# Patient Record
Sex: MALE | Race: WHITE | NOT HISPANIC OR LATINO | Employment: OTHER | URBAN - METROPOLITAN AREA
[De-identification: names, ages, dates, MRNs, and addresses within clinical notes are randomized per-mention and may not be internally consistent; named-entity substitution may affect disease eponyms.]

---

## 2017-01-03 ENCOUNTER — CLINICAL SUPPORT (OUTPATIENT)
Dept: CARDIOLOGY | Facility: CLINIC | Age: 72
End: 2017-01-03
Payer: MEDICARE

## 2017-01-03 DIAGNOSIS — Z95.0 PACEMAKER: Primary | ICD-10-CM

## 2017-01-03 DIAGNOSIS — Z95.0 PACEMAKER: ICD-10-CM

## 2017-01-03 DIAGNOSIS — I49.5 SSS (SICK SINUS SYNDROME): ICD-10-CM

## 2017-01-03 PROCEDURE — 93294 REM INTERROG EVL PM/LDLS PM: CPT | Mod: S$GLB,,, | Performed by: INTERNAL MEDICINE

## 2017-01-03 PROCEDURE — 93296 REM INTERROG EVL PM/IDS: CPT | Mod: S$GLB,,, | Performed by: INTERNAL MEDICINE

## 2017-04-25 ENCOUNTER — CLINICAL SUPPORT (OUTPATIENT)
Dept: CARDIOLOGY | Facility: CLINIC | Age: 72
End: 2017-04-25
Payer: MEDICARE

## 2017-04-25 DIAGNOSIS — I49.5 SSS (SICK SINUS SYNDROME): ICD-10-CM

## 2017-04-25 DIAGNOSIS — Z95.0 PACEMAKER: ICD-10-CM

## 2017-04-25 PROCEDURE — 93294 REM INTERROG EVL PM/LDLS PM: CPT | Mod: S$GLB,,, | Performed by: INTERNAL MEDICINE

## 2017-04-25 PROCEDURE — 93296 REM INTERROG EVL PM/IDS: CPT | Mod: S$GLB,,, | Performed by: INTERNAL MEDICINE

## 2017-05-25 ENCOUNTER — LAB VISIT (OUTPATIENT)
Dept: LAB | Facility: HOSPITAL | Age: 72
End: 2017-05-25
Attending: INTERNAL MEDICINE
Payer: MEDICARE

## 2017-05-25 DIAGNOSIS — E53.8 OTHER B-COMPLEX DEFICIENCIES: ICD-10-CM

## 2017-05-25 DIAGNOSIS — E29.1 OTHER TESTICULAR HYPOFUNCTION: Primary | ICD-10-CM

## 2017-05-25 DIAGNOSIS — E55.9 UNSPECIFIED VITAMIN D DEFICIENCY: ICD-10-CM

## 2017-05-25 LAB
25(OH)D3+25(OH)D2 SERPL-MCNC: 28 NG/ML
ALBUMIN SERPL BCP-MCNC: 3.6 G/DL
ALP SERPL-CCNC: 75 U/L
ALT SERPL W/O P-5'-P-CCNC: 18 U/L
ANION GAP SERPL CALC-SCNC: 6 MMOL/L
AST SERPL-CCNC: 32 U/L
BASOPHILS # BLD AUTO: 0.04 K/UL
BASOPHILS NFR BLD: 0.6 %
BILIRUB SERPL-MCNC: 0.7 MG/DL
BUN SERPL-MCNC: 14 MG/DL
CALCIUM SERPL-MCNC: 9 MG/DL
CHLORIDE SERPL-SCNC: 104 MMOL/L
CO2 SERPL-SCNC: 28 MMOL/L
CREAT SERPL-MCNC: 1.4 MG/DL
CREAT UR-MCNC: 116 MG/DL
DIFFERENTIAL METHOD: ABNORMAL
EOSINOPHIL # BLD AUTO: 0.2 K/UL
EOSINOPHIL NFR BLD: 3.1 %
ERYTHROCYTE [DISTWIDTH] IN BLOOD BY AUTOMATED COUNT: 14.1 %
EST. GFR  (AFRICAN AMERICAN): 56.8 ML/MIN/1.73 M^2
EST. GFR  (NON AFRICAN AMERICAN): 49.1 ML/MIN/1.73 M^2
GLUCOSE SERPL-MCNC: 82 MG/DL
HCT VFR BLD AUTO: 47.5 %
HGB BLD-MCNC: 15.9 G/DL
LH SERPL-ACNC: 0.1 MIU/ML
LYMPHOCYTES # BLD AUTO: 2.1 K/UL
LYMPHOCYTES NFR BLD: 30.4 %
MCH RBC QN AUTO: 32.7 PG
MCHC RBC AUTO-ENTMCNC: 33.5 %
MCV RBC AUTO: 98 FL
MICROALBUMIN UR DL<=1MG/L-MCNC: 4 UG/ML
MICROALBUMIN/CREATININE RATIO: 3.4 UG/MG
MONOCYTES # BLD AUTO: 0.9 K/UL
MONOCYTES NFR BLD: 13.2 %
NEUTROPHILS # BLD AUTO: 3.7 K/UL
NEUTROPHILS NFR BLD: 52.6 %
PLATELET # BLD AUTO: 287 K/UL
PMV BLD AUTO: 10.2 FL
POTASSIUM SERPL-SCNC: 4.4 MMOL/L
PROT SERPL-MCNC: 7 G/DL
RBC # BLD AUTO: 4.86 M/UL
SODIUM SERPL-SCNC: 138 MMOL/L
VIT B12 SERPL-MCNC: 569 PG/ML
WBC # BLD AUTO: 7.04 K/UL

## 2017-05-25 PROCEDURE — 83002 ASSAY OF GONADOTROPIN (LH): CPT

## 2017-05-25 PROCEDURE — 85025 COMPLETE CBC W/AUTO DIFF WBC: CPT

## 2017-05-25 PROCEDURE — 82570 ASSAY OF URINE CREATININE: CPT

## 2017-05-25 PROCEDURE — 84402 ASSAY OF FREE TESTOSTERONE: CPT

## 2017-05-25 PROCEDURE — 82306 VITAMIN D 25 HYDROXY: CPT

## 2017-05-25 PROCEDURE — 80053 COMPREHEN METABOLIC PANEL: CPT

## 2017-05-25 PROCEDURE — 82607 VITAMIN B-12: CPT

## 2017-07-13 LAB — TESTOST FREE SERPL-MCNC: 44.5 PG/ML

## 2017-08-16 ENCOUNTER — LAB VISIT (OUTPATIENT)
Dept: LAB | Facility: HOSPITAL | Age: 72
End: 2017-08-16
Attending: INTERNAL MEDICINE
Payer: MEDICARE

## 2017-08-16 DIAGNOSIS — Z12.5 SPECIAL SCREENING FOR MALIGNANT NEOPLASM OF PROSTATE: ICD-10-CM

## 2017-08-16 DIAGNOSIS — R53.81 MALAISE: ICD-10-CM

## 2017-08-16 DIAGNOSIS — E29.1 OTHER TESTICULAR HYPOFUNCTION: Primary | ICD-10-CM

## 2017-08-16 DIAGNOSIS — E55.9 UNSPECIFIED VITAMIN D DEFICIENCY: ICD-10-CM

## 2017-08-16 PROCEDURE — 36415 COLL VENOUS BLD VENIPUNCTURE: CPT | Mod: PO

## 2017-08-16 PROCEDURE — 84402 ASSAY OF FREE TESTOSTERONE: CPT

## 2017-08-18 DIAGNOSIS — Z12.11 COLON CANCER SCREENING: ICD-10-CM

## 2017-08-18 LAB — TESTOST FREE SERPL-MCNC: 10.4 PG/ML

## 2017-08-22 ENCOUNTER — PATIENT MESSAGE (OUTPATIENT)
Dept: FAMILY MEDICINE | Facility: CLINIC | Age: 72
End: 2017-08-22

## 2017-08-30 ENCOUNTER — OFFICE VISIT (OUTPATIENT)
Dept: CARDIOLOGY | Facility: CLINIC | Age: 72
End: 2017-08-30
Payer: MEDICARE

## 2017-08-30 ENCOUNTER — CLINICAL SUPPORT (OUTPATIENT)
Dept: CARDIOLOGY | Facility: CLINIC | Age: 72
End: 2017-08-30
Payer: MEDICARE

## 2017-08-30 VITALS
DIASTOLIC BLOOD PRESSURE: 89 MMHG | HEIGHT: 64 IN | WEIGHT: 150.81 LBS | SYSTOLIC BLOOD PRESSURE: 182 MMHG | BODY MASS INDEX: 25.75 KG/M2

## 2017-08-30 DIAGNOSIS — I10 ESSENTIAL HYPERTENSION: ICD-10-CM

## 2017-08-30 DIAGNOSIS — Z95.5 S/P CORONARY ARTERY STENT PLACEMENT: ICD-10-CM

## 2017-08-30 DIAGNOSIS — I49.5 SSS (SICK SINUS SYNDROME): ICD-10-CM

## 2017-08-30 DIAGNOSIS — Z95.0 PACEMAKER: ICD-10-CM

## 2017-08-30 DIAGNOSIS — I25.10 CORONARY ARTERY DISEASE INVOLVING NATIVE CORONARY ARTERY WITHOUT ANGINA PECTORIS, UNSPECIFIED WHETHER NATIVE OR TRANSPLANTED HEART: Primary | ICD-10-CM

## 2017-08-30 PROCEDURE — 1126F AMNT PAIN NOTED NONE PRSNT: CPT | Mod: S$GLB,,, | Performed by: INTERNAL MEDICINE

## 2017-08-30 PROCEDURE — 99214 OFFICE O/P EST MOD 30 MIN: CPT | Mod: S$GLB,,, | Performed by: INTERNAL MEDICINE

## 2017-08-30 PROCEDURE — 3077F SYST BP >= 140 MM HG: CPT | Mod: S$GLB,,, | Performed by: INTERNAL MEDICINE

## 2017-08-30 PROCEDURE — 1159F MED LIST DOCD IN RCRD: CPT | Mod: S$GLB,,, | Performed by: INTERNAL MEDICINE

## 2017-08-30 PROCEDURE — 99499 UNLISTED E&M SERVICE: CPT | Mod: S$GLB,,, | Performed by: INTERNAL MEDICINE

## 2017-08-30 PROCEDURE — 3079F DIAST BP 80-89 MM HG: CPT | Mod: S$GLB,,, | Performed by: INTERNAL MEDICINE

## 2017-08-30 PROCEDURE — 99999 PR PBB SHADOW E&M-EST. PATIENT-LVL II: CPT | Mod: PBBFAC,,, | Performed by: INTERNAL MEDICINE

## 2017-08-30 PROCEDURE — 3008F BODY MASS INDEX DOCD: CPT | Mod: S$GLB,,, | Performed by: INTERNAL MEDICINE

## 2017-08-30 PROCEDURE — 93280 PM DEVICE PROGR EVAL DUAL: CPT | Mod: S$GLB,,, | Performed by: INTERNAL MEDICINE

## 2017-09-20 ENCOUNTER — OFFICE VISIT (OUTPATIENT)
Dept: FAMILY MEDICINE | Facility: CLINIC | Age: 72
End: 2017-09-20
Payer: MEDICARE

## 2017-09-20 VITALS
DIASTOLIC BLOOD PRESSURE: 80 MMHG | RESPIRATION RATE: 18 BRPM | TEMPERATURE: 98 F | BODY MASS INDEX: 24.32 KG/M2 | SYSTOLIC BLOOD PRESSURE: 170 MMHG | HEART RATE: 64 BPM | HEIGHT: 68 IN | WEIGHT: 160.5 LBS

## 2017-09-20 DIAGNOSIS — F34.1 DYSTHYMIA: ICD-10-CM

## 2017-09-20 DIAGNOSIS — Z00.00 PREVENTATIVE HEALTH CARE: Primary | ICD-10-CM

## 2017-09-20 DIAGNOSIS — E78.5 HYPERLIPIDEMIA, UNSPECIFIED HYPERLIPIDEMIA TYPE: ICD-10-CM

## 2017-09-20 DIAGNOSIS — Z12.5 PROSTATE CANCER SCREENING: ICD-10-CM

## 2017-09-20 DIAGNOSIS — Z12.11 COLON CANCER SCREENING: ICD-10-CM

## 2017-09-20 DIAGNOSIS — M79.10 MYALGIA: ICD-10-CM

## 2017-09-20 PROCEDURE — 99397 PER PM REEVAL EST PAT 65+ YR: CPT | Mod: S$GLB,,, | Performed by: FAMILY MEDICINE

## 2017-09-20 PROCEDURE — 99999 PR PBB SHADOW E&M-EST. PATIENT-LVL III: CPT | Mod: PBBFAC,,, | Performed by: FAMILY MEDICINE

## 2017-09-20 PROCEDURE — 99499 UNLISTED E&M SERVICE: CPT | Mod: S$GLB,,, | Performed by: FAMILY MEDICINE

## 2017-09-20 PROCEDURE — 90662 IIV NO PRSV INCREASED AG IM: CPT | Mod: S$GLB,,, | Performed by: FAMILY MEDICINE

## 2017-09-20 PROCEDURE — G0008 ADMIN INFLUENZA VIRUS VAC: HCPCS | Mod: S$GLB,,, | Performed by: FAMILY MEDICINE

## 2017-09-20 RX ORDER — BUPROPION HYDROCHLORIDE 150 MG/1
150 TABLET ORAL DAILY
Qty: 30 TABLET | Refills: 11 | Status: SHIPPED | OUTPATIENT
Start: 2017-09-20 | End: 2018-09-08 | Stop reason: SDUPTHER
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
## 2017-09-29 ENCOUNTER — PATIENT MESSAGE (OUTPATIENT)
Dept: FAMILY MEDICINE | Facility: CLINIC | Age: 72
End: 2017-09-29

## 2017-10-02 RX ORDER — CLOPIDOGREL BISULFATE 75 MG/1
TABLET ORAL
Qty: 90 TABLET | Refills: 0 | Status: SHIPPED | OUTPATIENT
Start: 2017-10-02 | End: 2017-12-29 | Stop reason: SDUPTHER

## 2017-10-02 RX ORDER — PRAVASTATIN SODIUM 40 MG/1
TABLET ORAL
Qty: 90 TABLET | Refills: 0 | Status: SHIPPED | OUTPATIENT
Start: 2017-10-02 | End: 2017-12-29 | Stop reason: SDUPTHER

## 2017-10-04 ENCOUNTER — LAB VISIT (OUTPATIENT)
Dept: LAB | Facility: HOSPITAL | Age: 72
End: 2017-10-04
Attending: FAMILY MEDICINE
Payer: MEDICARE

## 2017-10-04 DIAGNOSIS — N40.1 BENIGN PROSTATIC HYPERPLASIA WITH URINARY OBSTRUCTION: ICD-10-CM

## 2017-10-04 DIAGNOSIS — Z12.5 PROSTATE CANCER SCREENING: ICD-10-CM

## 2017-10-04 DIAGNOSIS — N13.8 BENIGN PROSTATIC HYPERPLASIA WITH URINARY OBSTRUCTION: ICD-10-CM

## 2017-10-04 DIAGNOSIS — M79.10 MYALGIA: ICD-10-CM

## 2017-10-04 DIAGNOSIS — E78.5 HYPERLIPIDEMIA, UNSPECIFIED HYPERLIPIDEMIA TYPE: ICD-10-CM

## 2017-10-04 LAB
ALBUMIN SERPL BCP-MCNC: 3.6 G/DL
ALP SERPL-CCNC: 73 U/L
ALT SERPL W/O P-5'-P-CCNC: 23 U/L
ANION GAP SERPL CALC-SCNC: 10 MMOL/L
AST SERPL-CCNC: 39 U/L
BILIRUB SERPL-MCNC: 0.7 MG/DL
BUN SERPL-MCNC: 12 MG/DL
CALCIUM SERPL-MCNC: 9.4 MG/DL
CHLORIDE SERPL-SCNC: 102 MMOL/L
CHOLEST SERPL-MCNC: 137 MG/DL
CHOLEST/HDLC SERPL: 2.7 {RATIO}
CK SERPL-CCNC: 457 U/L
CO2 SERPL-SCNC: 26 MMOL/L
COMPLEXED PSA SERPL-MCNC: 0.83 NG/ML
CREAT SERPL-MCNC: 1.4 MG/DL
EST. GFR  (AFRICAN AMERICAN): 56.8 ML/MIN/1.73 M^2
EST. GFR  (NON AFRICAN AMERICAN): 49.1 ML/MIN/1.73 M^2
GLUCOSE SERPL-MCNC: 88 MG/DL
HDLC SERPL-MCNC: 51 MG/DL
HDLC SERPL: 37.2 %
LDLC SERPL CALC-MCNC: 73.6 MG/DL
NONHDLC SERPL-MCNC: 86 MG/DL
POTASSIUM SERPL-SCNC: 4 MMOL/L
PROSTATE SPECIFIC ANTIGEN, TOTAL: 0.46 NG/ML
PROT SERPL-MCNC: 7.3 G/DL
PSA FREE MFR SERPL: 28.26 %
PSA FREE SERPL-MCNC: 0.13 NG/ML
SODIUM SERPL-SCNC: 138 MMOL/L
TRIGL SERPL-MCNC: 62 MG/DL

## 2017-10-04 PROCEDURE — 36415 COLL VENOUS BLD VENIPUNCTURE: CPT | Mod: PO

## 2017-10-04 PROCEDURE — 82550 ASSAY OF CK (CPK): CPT

## 2017-10-04 PROCEDURE — 80061 LIPID PANEL: CPT

## 2017-10-04 PROCEDURE — 84153 ASSAY OF PSA TOTAL: CPT | Mod: 91

## 2017-10-04 PROCEDURE — 84153 ASSAY OF PSA TOTAL: CPT

## 2017-10-04 PROCEDURE — 80053 COMPREHEN METABOLIC PANEL: CPT

## 2017-10-18 ENCOUNTER — PATIENT MESSAGE (OUTPATIENT)
Dept: FAMILY MEDICINE | Facility: CLINIC | Age: 72
End: 2017-10-18

## 2017-11-21 ENCOUNTER — LAB VISIT (OUTPATIENT)
Dept: LAB | Facility: HOSPITAL | Age: 72
End: 2017-11-21
Attending: INTERNAL MEDICINE
Payer: MEDICARE

## 2017-11-21 DIAGNOSIS — E29.1 TESTICULAR HYPOFUNCTION: Primary | ICD-10-CM

## 2017-11-21 DIAGNOSIS — R53.81 DEBILITY: ICD-10-CM

## 2017-11-21 DIAGNOSIS — Z12.5 SPECIAL SCREENING FOR MALIGNANT NEOPLASM OF PROSTATE: ICD-10-CM

## 2017-11-21 DIAGNOSIS — E53.8 FOLATE DEFICIENCY: ICD-10-CM

## 2017-11-21 DIAGNOSIS — E55.9 VITAMIN D DEFICIENCY: ICD-10-CM

## 2017-11-21 LAB
25(OH)D3+25(OH)D2 SERPL-MCNC: 50 NG/ML
ALBUMIN SERPL BCP-MCNC: 3.7 G/DL
ALP SERPL-CCNC: 71 U/L
ALT SERPL W/O P-5'-P-CCNC: 25 U/L
ANION GAP SERPL CALC-SCNC: 6 MMOL/L
AST SERPL-CCNC: 39 U/L
BASOPHILS # BLD AUTO: 0.08 K/UL
BASOPHILS NFR BLD: 1 %
BILIRUB SERPL-MCNC: 0.7 MG/DL
BUN SERPL-MCNC: 14 MG/DL
CALCIUM SERPL-MCNC: 9.7 MG/DL
CHLORIDE SERPL-SCNC: 102 MMOL/L
CHOLEST SERPL-MCNC: 146 MG/DL
CHOLEST/HDLC SERPL: 2.9 {RATIO}
CO2 SERPL-SCNC: 29 MMOL/L
COMPLEXED PSA SERPL-MCNC: 0.94 NG/ML
CREAT SERPL-MCNC: 1.3 MG/DL
DIFFERENTIAL METHOD: ABNORMAL
EOSINOPHIL # BLD AUTO: 0.3 K/UL
EOSINOPHIL NFR BLD: 3.3 %
ERYTHROCYTE [DISTWIDTH] IN BLOOD BY AUTOMATED COUNT: 13.3 %
EST. GFR  (AFRICAN AMERICAN): >60 ML/MIN/1.73 M^2
EST. GFR  (NON AFRICAN AMERICAN): 53.8 ML/MIN/1.73 M^2
GLUCOSE SERPL-MCNC: 90 MG/DL
HCT VFR BLD AUTO: 48.1 %
HDLC SERPL-MCNC: 51 MG/DL
HDLC SERPL: 34.9 %
HGB BLD-MCNC: 16.1 G/DL
IMM GRANULOCYTES # BLD AUTO: 0.05 K/UL
IMM GRANULOCYTES NFR BLD AUTO: 0.6 %
LDLC SERPL CALC-MCNC: 81.4 MG/DL
LH SERPL-ACNC: 0.1 MIU/ML
LYMPHOCYTES # BLD AUTO: 2.1 K/UL
LYMPHOCYTES NFR BLD: 25.4 %
MCH RBC QN AUTO: 32.5 PG
MCHC RBC AUTO-ENTMCNC: 33.5 G/DL
MCV RBC AUTO: 97 FL
MONOCYTES # BLD AUTO: 1 K/UL
MONOCYTES NFR BLD: 12.8 %
NEUTROPHILS # BLD AUTO: 4.6 K/UL
NEUTROPHILS NFR BLD: 56.9 %
NONHDLC SERPL-MCNC: 95 MG/DL
NRBC BLD-RTO: 0 /100 WBC
PLATELET # BLD AUTO: 292 K/UL
PMV BLD AUTO: 10.2 FL
POTASSIUM SERPL-SCNC: 4.2 MMOL/L
PROT SERPL-MCNC: 7.6 G/DL
RBC # BLD AUTO: 4.95 M/UL
SODIUM SERPL-SCNC: 137 MMOL/L
T3FREE SERPL-MCNC: 2.2 PG/ML
T4 FREE SERPL-MCNC: 0.95 NG/DL
TRIGL SERPL-MCNC: 68 MG/DL
TSH SERPL DL<=0.005 MIU/L-ACNC: 2.47 UIU/ML
WBC # BLD AUTO: 8.12 K/UL

## 2017-11-21 PROCEDURE — 80053 COMPREHEN METABOLIC PANEL: CPT

## 2017-11-21 PROCEDURE — 82306 VITAMIN D 25 HYDROXY: CPT

## 2017-11-21 PROCEDURE — 85025 COMPLETE CBC W/AUTO DIFF WBC: CPT

## 2017-11-21 PROCEDURE — 84402 ASSAY OF FREE TESTOSTERONE: CPT

## 2017-11-21 PROCEDURE — 84153 ASSAY OF PSA TOTAL: CPT

## 2017-11-21 PROCEDURE — 36415 COLL VENOUS BLD VENIPUNCTURE: CPT | Mod: PO

## 2017-11-21 PROCEDURE — 84481 FREE ASSAY (FT-3): CPT

## 2017-11-21 PROCEDURE — 84439 ASSAY OF FREE THYROXINE: CPT

## 2017-11-21 PROCEDURE — 80061 LIPID PANEL: CPT

## 2017-11-21 PROCEDURE — 83002 ASSAY OF GONADOTROPIN (LH): CPT

## 2017-11-21 PROCEDURE — 84443 ASSAY THYROID STIM HORMONE: CPT

## 2017-11-24 LAB — TESTOST FREE SERPL-MCNC: 15.7 PG/ML

## 2017-12-04 ENCOUNTER — CLINICAL SUPPORT (OUTPATIENT)
Dept: CARDIOLOGY | Facility: CLINIC | Age: 72
End: 2017-12-04
Attending: INTERNAL MEDICINE
Payer: MEDICARE

## 2017-12-04 DIAGNOSIS — Z95.0 PACEMAKER: Primary | ICD-10-CM

## 2017-12-04 DIAGNOSIS — I49.5 SSS (SICK SINUS SYNDROME): ICD-10-CM

## 2017-12-04 DIAGNOSIS — Z95.0 PACEMAKER: ICD-10-CM

## 2017-12-04 PROCEDURE — 93294 REM INTERROG EVL PM/LDLS PM: CPT | Mod: S$GLB,,, | Performed by: INTERNAL MEDICINE

## 2017-12-04 PROCEDURE — 93296 REM INTERROG EVL PM/IDS: CPT | Mod: S$GLB,,, | Performed by: INTERNAL MEDICINE

## 2017-12-13 ENCOUNTER — PATIENT MESSAGE (OUTPATIENT)
Dept: UROLOGY | Facility: CLINIC | Age: 72
End: 2017-12-13

## 2017-12-13 DIAGNOSIS — N52.9 ERECTILE DYSFUNCTION, UNSPECIFIED ERECTILE DYSFUNCTION TYPE: Primary | ICD-10-CM

## 2017-12-29 RX ORDER — CLOPIDOGREL BISULFATE 75 MG/1
TABLET ORAL
Qty: 90 TABLET | Refills: 0 | Status: SHIPPED | OUTPATIENT
Start: 2017-12-29 | End: 2018-03-29 | Stop reason: SDUPTHER

## 2017-12-29 RX ORDER — PRAVASTATIN SODIUM 40 MG/1
TABLET ORAL
Qty: 90 TABLET | Refills: 0 | Status: SHIPPED | OUTPATIENT
Start: 2017-12-29 | End: 2018-03-29 | Stop reason: SDUPTHER

## 2018-03-05 ENCOUNTER — CLINICAL SUPPORT (OUTPATIENT)
Dept: CARDIOLOGY | Facility: CLINIC | Age: 73
End: 2018-03-05
Attending: INTERNAL MEDICINE
Payer: MEDICARE

## 2018-03-05 DIAGNOSIS — Z95.0 PACEMAKER: ICD-10-CM

## 2018-03-05 DIAGNOSIS — I49.5 SSS (SICK SINUS SYNDROME): ICD-10-CM

## 2018-03-05 PROCEDURE — 93294 REM INTERROG EVL PM/LDLS PM: CPT | Mod: S$GLB,,, | Performed by: INTERNAL MEDICINE

## 2018-03-05 PROCEDURE — 93296 REM INTERROG EVL PM/IDS: CPT | Mod: S$GLB,,, | Performed by: INTERNAL MEDICINE

## 2018-03-31 RX ORDER — PRAVASTATIN SODIUM 40 MG/1
TABLET ORAL
Qty: 90 TABLET | Refills: 0 | Status: SHIPPED | OUTPATIENT
Start: 2018-03-31 | End: 2018-06-29 | Stop reason: SDUPTHER

## 2018-03-31 RX ORDER — CLOPIDOGREL BISULFATE 75 MG/1
TABLET ORAL
Qty: 90 TABLET | Refills: 0 | Status: SHIPPED | OUTPATIENT
Start: 2018-03-31 | End: 2018-12-19

## 2018-04-19 ENCOUNTER — PES CALL (OUTPATIENT)
Dept: ADMINISTRATIVE | Facility: CLINIC | Age: 73
End: 2018-04-19

## 2018-06-12 ENCOUNTER — CLINICAL SUPPORT (OUTPATIENT)
Dept: CARDIOLOGY | Facility: CLINIC | Age: 73
End: 2018-06-12
Attending: INTERNAL MEDICINE
Payer: MEDICARE

## 2018-06-12 DIAGNOSIS — I49.5 SSS (SICK SINUS SYNDROME): ICD-10-CM

## 2018-06-12 DIAGNOSIS — Z95.0 PACEMAKER: Primary | ICD-10-CM

## 2018-06-12 DIAGNOSIS — Z95.0 PACEMAKER: ICD-10-CM

## 2018-06-12 PROCEDURE — 93294 REM INTERROG EVL PM/LDLS PM: CPT | Mod: S$GLB,,, | Performed by: INTERNAL MEDICINE

## 2018-06-12 PROCEDURE — 93296 REM INTERROG EVL PM/IDS: CPT | Mod: S$GLB,,, | Performed by: INTERNAL MEDICINE

## 2018-06-13 ENCOUNTER — LAB VISIT (OUTPATIENT)
Dept: LAB | Facility: HOSPITAL | Age: 73
End: 2018-06-13
Attending: INTERNAL MEDICINE
Payer: MEDICARE

## 2018-06-13 DIAGNOSIS — E29.1 MALE HYPOGONADISM: Primary | ICD-10-CM

## 2018-06-13 DIAGNOSIS — R73.01 IMPAIRED FASTING GLUCOSE: ICD-10-CM

## 2018-06-13 DIAGNOSIS — R53.81 MALAISE: ICD-10-CM

## 2018-06-13 DIAGNOSIS — E55.9 VITAMIN D DEFICIENCY: ICD-10-CM

## 2018-06-13 DIAGNOSIS — E53.8 VITAMIN B 12 DEFICIENCY: ICD-10-CM

## 2018-06-13 LAB
25(OH)D3+25(OH)D2 SERPL-MCNC: 47 NG/ML
ALBUMIN SERPL BCP-MCNC: 3.6 G/DL
ALP SERPL-CCNC: 64 U/L
ALT SERPL W/O P-5'-P-CCNC: 22 U/L
ANION GAP SERPL CALC-SCNC: 6 MMOL/L
AST SERPL-CCNC: 30 U/L
BASOPHILS # BLD AUTO: 0.05 K/UL
BASOPHILS NFR BLD: 0.7 %
BILIRUB SERPL-MCNC: 0.6 MG/DL
BUN SERPL-MCNC: 15 MG/DL
CALCIUM SERPL-MCNC: 9.2 MG/DL
CHLORIDE SERPL-SCNC: 103 MMOL/L
CHOLEST SERPL-MCNC: 126 MG/DL
CHOLEST/HDLC SERPL: 2.9 {RATIO}
CK SERPL-CCNC: 296 U/L
CO2 SERPL-SCNC: 29 MMOL/L
CREAT SERPL-MCNC: 1.3 MG/DL
DIFFERENTIAL METHOD: ABNORMAL
EOSINOPHIL # BLD AUTO: 0.1 K/UL
EOSINOPHIL NFR BLD: 1.7 %
ERYTHROCYTE [DISTWIDTH] IN BLOOD BY AUTOMATED COUNT: 13.5 %
EST. GFR  (AFRICAN AMERICAN): >60 ML/MIN/1.73 M^2
EST. GFR  (NON AFRICAN AMERICAN): 53.4 ML/MIN/1.73 M^2
ESTIMATED AVG GLUCOSE: 105 MG/DL
GLUCOSE SERPL-MCNC: 88 MG/DL
HBA1C MFR BLD HPLC: 5.3 %
HCT VFR BLD AUTO: 48.8 %
HDLC SERPL-MCNC: 43 MG/DL
HDLC SERPL: 34.1 %
HGB BLD-MCNC: 16 G/DL
IMM GRANULOCYTES # BLD AUTO: 0.04 K/UL
IMM GRANULOCYTES NFR BLD AUTO: 0.6 %
LDLC SERPL CALC-MCNC: 72.2 MG/DL
LH SERPL-ACNC: <0.1 MIU/ML
LYMPHOCYTES # BLD AUTO: 2 K/UL
LYMPHOCYTES NFR BLD: 27.8 %
MCH RBC QN AUTO: 32.7 PG
MCHC RBC AUTO-ENTMCNC: 32.8 G/DL
MCV RBC AUTO: 100 FL
MONOCYTES # BLD AUTO: 1 K/UL
MONOCYTES NFR BLD: 14.7 %
NEUTROPHILS # BLD AUTO: 3.9 K/UL
NEUTROPHILS NFR BLD: 54.5 %
NONHDLC SERPL-MCNC: 83 MG/DL
NRBC BLD-RTO: 0 /100 WBC
PLATELET # BLD AUTO: 300 K/UL
PMV BLD AUTO: 10.2 FL
POTASSIUM SERPL-SCNC: 4.3 MMOL/L
PROT SERPL-MCNC: 7 G/DL
RBC # BLD AUTO: 4.89 M/UL
SODIUM SERPL-SCNC: 138 MMOL/L
T3FREE SERPL-MCNC: 2.5 PG/ML
T4 FREE SERPL-MCNC: 0.91 NG/DL
TESTOST SERPL-MCNC: 1416 NG/DL
TRIGL SERPL-MCNC: 54 MG/DL
TSH SERPL DL<=0.005 MIU/L-ACNC: 3.35 UIU/ML
WBC # BLD AUTO: 7.08 K/UL

## 2018-06-13 PROCEDURE — 84403 ASSAY OF TOTAL TESTOSTERONE: CPT

## 2018-06-13 PROCEDURE — 82306 VITAMIN D 25 HYDROXY: CPT

## 2018-06-13 PROCEDURE — 36415 COLL VENOUS BLD VENIPUNCTURE: CPT | Mod: PO

## 2018-06-13 PROCEDURE — 83002 ASSAY OF GONADOTROPIN (LH): CPT

## 2018-06-13 PROCEDURE — 80053 COMPREHEN METABOLIC PANEL: CPT

## 2018-06-13 PROCEDURE — 85025 COMPLETE CBC W/AUTO DIFF WBC: CPT

## 2018-06-13 PROCEDURE — 84439 ASSAY OF FREE THYROXINE: CPT

## 2018-06-13 PROCEDURE — 82550 ASSAY OF CK (CPK): CPT

## 2018-06-13 PROCEDURE — 84481 FREE ASSAY (FT-3): CPT

## 2018-06-13 PROCEDURE — 83036 HEMOGLOBIN GLYCOSYLATED A1C: CPT

## 2018-06-13 PROCEDURE — 84402 ASSAY OF FREE TESTOSTERONE: CPT

## 2018-06-13 PROCEDURE — 80061 LIPID PANEL: CPT

## 2018-06-13 PROCEDURE — 84443 ASSAY THYROID STIM HORMONE: CPT

## 2018-06-15 LAB — TESTOST FREE SERPL-MCNC: 18.9 PG/ML

## 2018-06-27 LAB
AV DELAY - LONGEST: 400 MSEC
AV DELAY - SHORTEST: 250 MSEC
BATTERY VOLTAGE (V): 3.01 V
IMPEDANCE RA LEAD (NATIVE): 440 OHMS
IMPEDANCE RA LEAD: 400 OHMS
OHS CV DC PP MS1: 0.4 MS
OHS CV DC PP MS2: 0.4 MS
OHS CV DC PP V1: NORMAL V
OHS CV DC PP V2: NORMAL V
P/R-WAVE RA LEAD (NATIVE): 12 MV
P/R-WAVE RA LEAD: 5 MV
PV DELAY - LONGEST: 375 MSEC
PV DELAY - SHORTEST: 225 MSEC
THRESHOLD MS RA LEAD (NATIVE): 0.4 MS
THRESHOLD MS RA LEAD: 0.4 MS
THRESHOLD V RA LEAD (NATIVE): 1.38 V
THRESHOLD V RA LEAD: 0.75 V

## 2018-06-29 RX ORDER — PRAVASTATIN SODIUM 40 MG/1
TABLET ORAL
Qty: 90 TABLET | Refills: 0 | Status: SHIPPED | OUTPATIENT
Start: 2018-06-29 | End: 2018-09-27 | Stop reason: SDUPTHER

## 2018-08-07 DIAGNOSIS — Z95.0 PACEMAKER: Primary | ICD-10-CM

## 2018-08-07 DIAGNOSIS — I49.5 SSS (SICK SINUS SYNDROME): ICD-10-CM

## 2018-09-08 RX ORDER — BUPROPION HYDROCHLORIDE 150 MG/1
TABLET ORAL
Qty: 90 TABLET | Refills: 3 | Status: SHIPPED | OUTPATIENT
Start: 2018-09-08 | End: 2019-06-03

## 2018-09-20 ENCOUNTER — LAB VISIT (OUTPATIENT)
Dept: LAB | Facility: HOSPITAL | Age: 73
End: 2018-09-20
Attending: INTERNAL MEDICINE
Payer: MEDICARE

## 2018-09-20 DIAGNOSIS — E29.1 TESTICULAR HYPOFUNCTION: Primary | ICD-10-CM

## 2018-09-20 LAB — TESTOST SERPL-MCNC: 1303 NG/DL

## 2018-09-20 PROCEDURE — 84402 ASSAY OF FREE TESTOSTERONE: CPT

## 2018-09-20 PROCEDURE — 84403 ASSAY OF TOTAL TESTOSTERONE: CPT

## 2018-09-20 PROCEDURE — 36415 COLL VENOUS BLD VENIPUNCTURE: CPT | Mod: PO

## 2018-09-24 LAB — TESTOST FREE SERPL-MCNC: 15.2 PG/ML

## 2018-09-25 ENCOUNTER — OFFICE VISIT (OUTPATIENT)
Dept: CARDIOLOGY | Facility: CLINIC | Age: 73
End: 2018-09-25
Payer: MEDICARE

## 2018-09-25 ENCOUNTER — CLINICAL SUPPORT (OUTPATIENT)
Dept: CARDIOLOGY | Facility: CLINIC | Age: 73
End: 2018-09-25
Attending: INTERNAL MEDICINE
Payer: MEDICARE

## 2018-09-25 VITALS
HEART RATE: 68 BPM | SYSTOLIC BLOOD PRESSURE: 180 MMHG | DIASTOLIC BLOOD PRESSURE: 85 MMHG | WEIGHT: 157.19 LBS | HEIGHT: 68 IN | BODY MASS INDEX: 23.82 KG/M2

## 2018-09-25 DIAGNOSIS — I49.5 SSS (SICK SINUS SYNDROME): Primary | ICD-10-CM

## 2018-09-25 DIAGNOSIS — Z95.0 PACEMAKER: ICD-10-CM

## 2018-09-25 DIAGNOSIS — I25.10 CORONARY ARTERY DISEASE INVOLVING NATIVE CORONARY ARTERY WITHOUT ANGINA PECTORIS, UNSPECIFIED WHETHER NATIVE OR TRANSPLANTED HEART: ICD-10-CM

## 2018-09-25 DIAGNOSIS — I10 ESSENTIAL HYPERTENSION: ICD-10-CM

## 2018-09-25 DIAGNOSIS — I49.5 SSS (SICK SINUS SYNDROME): ICD-10-CM

## 2018-09-25 PROCEDURE — 99499 UNLISTED E&M SERVICE: CPT | Mod: S$GLB,,, | Performed by: INTERNAL MEDICINE

## 2018-09-25 PROCEDURE — 93280 PM DEVICE PROGR EVAL DUAL: CPT | Mod: PBBFAC,PO | Performed by: INTERNAL MEDICINE

## 2018-09-25 PROCEDURE — 3079F DIAST BP 80-89 MM HG: CPT | Mod: CPTII,,, | Performed by: INTERNAL MEDICINE

## 2018-09-25 PROCEDURE — 3077F SYST BP >= 140 MM HG: CPT | Mod: CPTII,,, | Performed by: INTERNAL MEDICINE

## 2018-09-25 PROCEDURE — 99213 OFFICE O/P EST LOW 20 MIN: CPT | Mod: PBBFAC,PO,25 | Performed by: INTERNAL MEDICINE

## 2018-09-25 PROCEDURE — 3288F FALL RISK ASSESSMENT DOCD: CPT | Mod: CPTII,,, | Performed by: INTERNAL MEDICINE

## 2018-09-25 PROCEDURE — 1100F PTFALLS ASSESS-DOCD GE2>/YR: CPT | Mod: CPTII,,, | Performed by: INTERNAL MEDICINE

## 2018-09-25 PROCEDURE — 99214 OFFICE O/P EST MOD 30 MIN: CPT | Mod: S$PBB,,, | Performed by: INTERNAL MEDICINE

## 2018-09-25 PROCEDURE — 99999 PR PBB SHADOW E&M-EST. PATIENT-LVL III: CPT | Mod: PBBFAC,,, | Performed by: INTERNAL MEDICINE

## 2018-09-25 NOTE — PROGRESS NOTES
Subjective:    Patient ID:  José Miguel Austin is a 73 y.o. male who presents for follow-up of CAD    HPI  He comes for follow up with no major problems, no chest pain, no shortness of breath.  Lots of bruises    Review of Systems   Constitution: Negative for decreased appetite, weakness, malaise/fatigue, weight gain and weight loss.   Cardiovascular: Negative for chest pain, dyspnea on exertion, leg swelling, palpitations and syncope.   Respiratory: Negative for cough and shortness of breath.    Gastrointestinal: Negative.    All other systems reviewed and are negative.       Objective:      Physical Exam   Constitutional: He is oriented to person, place, and time. He appears well-developed and well-nourished.   HENT:   Head: Normocephalic.   Eyes: Pupils are equal, round, and reactive to light.   Neck: Normal range of motion. Neck supple. No JVD present. Carotid bruit is not present. No thyromegaly present.   Cardiovascular: Normal rate, regular rhythm, normal heart sounds, intact distal pulses and normal pulses. PMI is not displaced. Exam reveals no gallop.   No murmur heard.  Pulmonary/Chest: Effort normal and breath sounds normal.   Abdominal: Soft. Normal appearance. He exhibits no mass. There is no hepatosplenomegaly. There is no tenderness.   Musculoskeletal: Normal range of motion. He exhibits no edema.   Neurological: He is alert and oriented to person, place, and time. He has normal strength and normal reflexes. No sensory deficit.   Skin: Skin is warm and intact.   Psychiatric: He has a normal mood and affect.   Nursing note and vitals reviewed.        Assessment:       1. SSS (sick sinus syndrome)    2. Coronary artery disease involving native coronary artery without angina pectoris, unspecified whether native or transplanted heart    3. Essential hypertension    4. Pacemaker         Plan:   Stop plavix  Continue all cardiac medications  Regular exercise program  1 yr f/u

## 2018-09-26 LAB
AV DELAY - LONGEST: 400 MSEC
AV DELAY - SHORTEST: 250 MSEC
BATTERY VOLTAGE (V): 3.01 V
IMPEDANCE RA LEAD (NATIVE): 430 OHMS
IMPEDANCE RA LEAD: 360 OHMS
P/R-WAVE RA LEAD (NATIVE): 12 MV
P/R-WAVE RA LEAD: 5 MV
PV DELAY - LONGEST: 375 MSEC
PV DELAY - SHORTEST: 225 MSEC
THRESHOLD MS RA LEAD (DONOR): 0.4 MS
THRESHOLD MS RA LEAD (NATIVE): 0.4 MS
THRESHOLD MS RA LEAD: 0.4 MS
THRESHOLD MS RV LEAD: 0.4 V
THRESHOLD V RA LEAD (DONOR): 1 V
THRESHOLD V RA LEAD (NATIVE): 0.62 V
THRESHOLD V RA LEAD: 0.75 V
THRESHOLD V RV LEAD: 1.25 V

## 2018-09-27 RX ORDER — PRAVASTATIN SODIUM 40 MG/1
TABLET ORAL
Qty: 90 TABLET | Refills: 0 | Status: SHIPPED | OUTPATIENT
Start: 2018-09-27 | End: 2018-12-24 | Stop reason: SDUPTHER

## 2018-11-01 ENCOUNTER — PATIENT MESSAGE (OUTPATIENT)
Dept: FAMILY MEDICINE | Facility: CLINIC | Age: 73
End: 2018-11-01

## 2018-11-01 DIAGNOSIS — I10 ESSENTIAL HYPERTENSION: Primary | ICD-10-CM

## 2018-11-01 DIAGNOSIS — Z12.5 PROSTATE CANCER SCREENING: ICD-10-CM

## 2018-12-03 ENCOUNTER — LAB VISIT (OUTPATIENT)
Dept: LAB | Facility: HOSPITAL | Age: 73
End: 2018-12-03
Attending: SPECIALIST
Payer: MEDICARE

## 2018-12-03 DIAGNOSIS — Z12.5 PROSTATE CANCER SCREENING: ICD-10-CM

## 2018-12-03 DIAGNOSIS — I10 ESSENTIAL HYPERTENSION: ICD-10-CM

## 2018-12-03 LAB
ALBUMIN SERPL BCP-MCNC: 3.3 G/DL
ALP SERPL-CCNC: 64 U/L
ALT SERPL W/O P-5'-P-CCNC: 19 U/L
ANION GAP SERPL CALC-SCNC: 4 MMOL/L
AST SERPL-CCNC: 26 U/L
BILIRUB SERPL-MCNC: 0.6 MG/DL
BUN SERPL-MCNC: 16 MG/DL
CALCIUM SERPL-MCNC: 9.4 MG/DL
CHLORIDE SERPL-SCNC: 104 MMOL/L
CHOLEST SERPL-MCNC: 135 MG/DL
CHOLEST/HDLC SERPL: 2.9 {RATIO}
CO2 SERPL-SCNC: 29 MMOL/L
COMPLEXED PSA SERPL-MCNC: 0.52 NG/ML
CREAT SERPL-MCNC: 1.3 MG/DL
EST. GFR  (AFRICAN AMERICAN): >60 ML/MIN/1.73 M^2
EST. GFR  (NON AFRICAN AMERICAN): 53.4 ML/MIN/1.73 M^2
GLUCOSE SERPL-MCNC: 85 MG/DL
HDLC SERPL-MCNC: 46 MG/DL
HDLC SERPL: 34.1 %
LDLC SERPL CALC-MCNC: 77.2 MG/DL
NONHDLC SERPL-MCNC: 89 MG/DL
POTASSIUM SERPL-SCNC: 4.4 MMOL/L
PROT SERPL-MCNC: 6.8 G/DL
SODIUM SERPL-SCNC: 137 MMOL/L
TRIGL SERPL-MCNC: 59 MG/DL

## 2018-12-03 PROCEDURE — 36415 COLL VENOUS BLD VENIPUNCTURE: CPT | Mod: HCNC,PO

## 2018-12-03 PROCEDURE — 80061 LIPID PANEL: CPT | Mod: HCNC

## 2018-12-03 PROCEDURE — 80053 COMPREHEN METABOLIC PANEL: CPT | Mod: HCNC

## 2018-12-03 PROCEDURE — 84153 ASSAY OF PSA TOTAL: CPT | Mod: HCNC

## 2018-12-19 ENCOUNTER — OFFICE VISIT (OUTPATIENT)
Dept: FAMILY MEDICINE | Facility: CLINIC | Age: 73
End: 2018-12-19
Payer: MEDICARE

## 2018-12-19 VITALS
HEART RATE: 71 BPM | OXYGEN SATURATION: 98 % | HEIGHT: 68 IN | BODY MASS INDEX: 25.49 KG/M2 | WEIGHT: 168.19 LBS | SYSTOLIC BLOOD PRESSURE: 140 MMHG | DIASTOLIC BLOOD PRESSURE: 86 MMHG

## 2018-12-19 DIAGNOSIS — Z00.00 PREVENTATIVE HEALTH CARE: Primary | ICD-10-CM

## 2018-12-19 PROCEDURE — 99999 PR PBB SHADOW E&M-EST. PATIENT-LVL III: CPT | Mod: PBBFAC,HCNC,, | Performed by: FAMILY MEDICINE

## 2018-12-19 PROCEDURE — 99499 UNLISTED E&M SERVICE: CPT | Mod: S$GLB,,, | Performed by: FAMILY MEDICINE

## 2018-12-19 PROCEDURE — G0008 ADMIN INFLUENZA VIRUS VAC: HCPCS | Mod: HCNC,S$GLB,, | Performed by: FAMILY MEDICINE

## 2018-12-19 PROCEDURE — 90662 IIV NO PRSV INCREASED AG IM: CPT | Mod: HCNC,S$GLB,, | Performed by: FAMILY MEDICINE

## 2018-12-19 PROCEDURE — 3077F SYST BP >= 140 MM HG: CPT | Mod: CPTII,HCNC,S$GLB, | Performed by: FAMILY MEDICINE

## 2018-12-19 PROCEDURE — 3079F DIAST BP 80-89 MM HG: CPT | Mod: CPTII,HCNC,S$GLB, | Performed by: FAMILY MEDICINE

## 2018-12-19 PROCEDURE — 99397 PER PM REEVAL EST PAT 65+ YR: CPT | Mod: HCNC,S$GLB,, | Performed by: FAMILY MEDICINE
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
## 2018-12-20 ENCOUNTER — LAB VISIT (OUTPATIENT)
Dept: LAB | Facility: HOSPITAL | Age: 73
End: 2018-12-20
Attending: INTERNAL MEDICINE
Payer: MEDICARE

## 2018-12-20 DIAGNOSIS — E53.8 BIOTIN-(PROPIONYL-COA-CARBOXYLASE) LIGASE DEFICIENCY: Primary | ICD-10-CM

## 2018-12-20 DIAGNOSIS — R73.09 OTHER ABNORMAL GLUCOSE: ICD-10-CM

## 2018-12-20 DIAGNOSIS — E55.9 AVITAMINOSIS D: ICD-10-CM

## 2018-12-20 DIAGNOSIS — Z12.5 SPECIAL SCREENING FOR MALIGNANT NEOPLASM OF PROSTATE: ICD-10-CM

## 2018-12-20 DIAGNOSIS — E78.41 ELEVATED LIPOPROTEIN A LEVEL: ICD-10-CM

## 2018-12-20 DIAGNOSIS — E29.1 3-OXO-5 ALPHA-STEROID DELTA 4-DEHYDROGENASE DEFICIENCY: ICD-10-CM

## 2018-12-20 LAB
25(OH)D3+25(OH)D2 SERPL-MCNC: 54 NG/ML
ALBUMIN SERPL BCP-MCNC: 3.3 G/DL
ALP SERPL-CCNC: 63 U/L
ALT SERPL W/O P-5'-P-CCNC: 17 U/L
ANION GAP SERPL CALC-SCNC: 7 MMOL/L
AST SERPL-CCNC: 26 U/L
BASOPHILS # BLD AUTO: 0.07 K/UL
BASOPHILS NFR BLD: 0.9 %
BILIRUB SERPL-MCNC: 0.7 MG/DL
BUN SERPL-MCNC: 15 MG/DL
CALCIUM SERPL-MCNC: 9 MG/DL
CHLORIDE SERPL-SCNC: 103 MMOL/L
CHOLEST SERPL-MCNC: 126 MG/DL
CHOLEST/HDLC SERPL: 2.7 {RATIO}
CK SERPL-CCNC: 301 U/L
CO2 SERPL-SCNC: 27 MMOL/L
COMPLEXED PSA SERPL-MCNC: 0.62 NG/ML
CREAT SERPL-MCNC: 1.2 MG/DL
DIFFERENTIAL METHOD: ABNORMAL
EOSINOPHIL # BLD AUTO: 0.1 K/UL
EOSINOPHIL NFR BLD: 1.8 %
ERYTHROCYTE [DISTWIDTH] IN BLOOD BY AUTOMATED COUNT: 16.5 %
EST. GFR  (AFRICAN AMERICAN): >60 ML/MIN/1.73 M^2
EST. GFR  (NON AFRICAN AMERICAN): 58.8 ML/MIN/1.73 M^2
ESTIMATED AVG GLUCOSE: 100 MG/DL
GLUCOSE SERPL-MCNC: 80 MG/DL
HBA1C MFR BLD HPLC: 5.1 %
HCT VFR BLD AUTO: 46 %
HDLC SERPL-MCNC: 47 MG/DL
HDLC SERPL: 37.3 %
HGB BLD-MCNC: 14.5 G/DL
IMM GRANULOCYTES # BLD AUTO: 0.03 K/UL
IMM GRANULOCYTES NFR BLD AUTO: 0.4 %
LDLC SERPL CALC-MCNC: 68.4 MG/DL
LYMPHOCYTES # BLD AUTO: 1.8 K/UL
LYMPHOCYTES NFR BLD: 23.5 %
MCH RBC QN AUTO: 30.1 PG
MCHC RBC AUTO-ENTMCNC: 31.5 G/DL
MCV RBC AUTO: 96 FL
MONOCYTES # BLD AUTO: 1.3 K/UL
MONOCYTES NFR BLD: 16.8 %
NEUTROPHILS # BLD AUTO: 4.4 K/UL
NEUTROPHILS NFR BLD: 56.6 %
NONHDLC SERPL-MCNC: 79 MG/DL
NRBC BLD-RTO: 0 /100 WBC
PLATELET # BLD AUTO: 291 K/UL
PMV BLD AUTO: 10.1 FL
POTASSIUM SERPL-SCNC: 4.2 MMOL/L
PROT SERPL-MCNC: 7 G/DL
RBC # BLD AUTO: 4.81 M/UL
SODIUM SERPL-SCNC: 137 MMOL/L
T3FREE SERPL-MCNC: 2.4 PG/ML
T4 FREE SERPL-MCNC: 0.95 NG/DL
TESTOST SERPL-MCNC: >1500 NG/DL
TRIGL SERPL-MCNC: 53 MG/DL
TSH SERPL DL<=0.005 MIU/L-ACNC: 2.15 UIU/ML
WBC # BLD AUTO: 7.82 K/UL

## 2018-12-20 PROCEDURE — 84402 ASSAY OF FREE TESTOSTERONE: CPT | Mod: HCNC

## 2018-12-20 PROCEDURE — 82306 VITAMIN D 25 HYDROXY: CPT | Mod: HCNC

## 2018-12-20 PROCEDURE — 84403 ASSAY OF TOTAL TESTOSTERONE: CPT | Mod: HCNC

## 2018-12-20 PROCEDURE — 80061 LIPID PANEL: CPT | Mod: HCNC

## 2018-12-20 PROCEDURE — 84439 ASSAY OF FREE THYROXINE: CPT | Mod: HCNC

## 2018-12-20 PROCEDURE — 84443 ASSAY THYROID STIM HORMONE: CPT | Mod: HCNC

## 2018-12-20 PROCEDURE — 80053 COMPREHEN METABOLIC PANEL: CPT | Mod: HCNC

## 2018-12-20 PROCEDURE — 82550 ASSAY OF CK (CPK): CPT | Mod: HCNC

## 2018-12-20 PROCEDURE — 83036 HEMOGLOBIN GLYCOSYLATED A1C: CPT | Mod: HCNC

## 2018-12-20 PROCEDURE — 85025 COMPLETE CBC W/AUTO DIFF WBC: CPT | Mod: HCNC

## 2018-12-20 PROCEDURE — 84481 FREE ASSAY (FT-3): CPT | Mod: HCNC

## 2018-12-20 PROCEDURE — 84153 ASSAY OF PSA TOTAL: CPT | Mod: HCNC

## 2018-12-24 RX ORDER — PRAVASTATIN SODIUM 40 MG/1
TABLET ORAL
Qty: 90 TABLET | Refills: 0 | Status: SHIPPED | OUTPATIENT
Start: 2018-12-24 | End: 2019-03-23 | Stop reason: SDUPTHER

## 2018-12-26 LAB — TESTOST FREE SERPL-MCNC: 68.6 PG/ML

## 2019-01-21 ENCOUNTER — CLINICAL SUPPORT (OUTPATIENT)
Dept: CARDIOLOGY | Facility: CLINIC | Age: 74
End: 2019-01-21
Attending: INTERNAL MEDICINE
Payer: MEDICARE

## 2019-01-21 DIAGNOSIS — Z95.0 PACEMAKER: ICD-10-CM

## 2019-01-21 DIAGNOSIS — I49.5 SSS (SICK SINUS SYNDROME): ICD-10-CM

## 2019-01-21 DIAGNOSIS — N52.9 ERECTILE DYSFUNCTION, UNSPECIFIED ERECTILE DYSFUNCTION TYPE: ICD-10-CM

## 2019-01-21 PROCEDURE — 93294 CARDIAC DEVICE CHECK CHECK - REMOTE: ICD-10-PCS | Mod: S$GLB,,, | Performed by: INTERNAL MEDICINE

## 2019-01-21 PROCEDURE — 93296 CARDIAC DEVICE CHECK CHECK - REMOTE: ICD-10-PCS | Mod: S$GLB,,, | Performed by: INTERNAL MEDICINE

## 2019-01-21 PROCEDURE — 93294 REM INTERROG EVL PM/LDLS PM: CPT | Mod: S$GLB,,, | Performed by: INTERNAL MEDICINE

## 2019-01-21 PROCEDURE — 93296 REM INTERROG EVL PM/IDS: CPT | Mod: S$GLB,,, | Performed by: INTERNAL MEDICINE

## 2019-01-28 LAB
AV DELAY - FIXED: 250 MSEC
BATTERY VOLTAGE (V): 2.99 V
IMPEDANCE RA LEAD (NATIVE): 440 OHMS
IMPEDANCE RA LEAD: 380 OHMS
OHS CV DC PP MS1: 0.4 MS
OHS CV DC PP MS2: 0.4 MS
OHS CV DC PP V1: NORMAL V
OHS CV DC PP V2: NORMAL V
P/R-WAVE RA LEAD (NATIVE): 12 MV
P/R-WAVE RA LEAD: 4.9 MV
PV DELAY - FIXED: 225 MSEC
THRESHOLD MS RA LEAD (NATIVE): 0.4 MS
THRESHOLD MS RA LEAD: 0.4 MS
THRESHOLD V RA LEAD (NATIVE): 1.12 V
THRESHOLD V RA LEAD: 0.62 V

## 2019-03-24 RX ORDER — PRAVASTATIN SODIUM 40 MG/1
TABLET ORAL
Qty: 90 TABLET | Refills: 0 | Status: SHIPPED | OUTPATIENT
Start: 2019-03-24 | End: 2019-06-21 | Stop reason: SDUPTHER

## 2019-05-07 ENCOUNTER — CLINICAL SUPPORT (OUTPATIENT)
Dept: CARDIOLOGY | Facility: CLINIC | Age: 74
End: 2019-05-07
Attending: INTERNAL MEDICINE
Payer: MEDICARE

## 2019-05-07 DIAGNOSIS — I49.5 SSS (SICK SINUS SYNDROME): ICD-10-CM

## 2019-05-07 DIAGNOSIS — Z95.0 PACEMAKER: ICD-10-CM

## 2019-05-30 ENCOUNTER — LAB VISIT (OUTPATIENT)
Dept: LAB | Facility: HOSPITAL | Age: 74
End: 2019-05-30
Attending: INTERNAL MEDICINE
Payer: MEDICARE

## 2019-05-30 ENCOUNTER — PATIENT MESSAGE (OUTPATIENT)
Dept: FAMILY MEDICINE | Facility: CLINIC | Age: 74
End: 2019-05-30

## 2019-05-30 DIAGNOSIS — E29.1 MALE HYPOGONADISM: ICD-10-CM

## 2019-05-30 DIAGNOSIS — E55.9 VITAMIN D DEFICIENCY: Primary | ICD-10-CM

## 2019-05-30 DIAGNOSIS — Z12.5 SPECIAL SCREENING FOR MALIGNANT NEOPLASM OF PROSTATE: ICD-10-CM

## 2019-05-30 DIAGNOSIS — R53.81 MALAISE: ICD-10-CM

## 2019-05-30 LAB
ALBUMIN SERPL BCP-MCNC: 3.3 G/DL (ref 3.5–5.2)
ALP SERPL-CCNC: 63 U/L (ref 55–135)
ALT SERPL W/O P-5'-P-CCNC: 25 U/L (ref 10–44)
ANION GAP SERPL CALC-SCNC: 11 MMOL/L (ref 8–16)
AST SERPL-CCNC: 35 U/L (ref 10–40)
BILIRUB SERPL-MCNC: 0.6 MG/DL (ref 0.1–1)
BUN SERPL-MCNC: 15 MG/DL (ref 8–23)
CALCIUM SERPL-MCNC: 9.2 MG/DL (ref 8.7–10.5)
CHLORIDE SERPL-SCNC: 102 MMOL/L (ref 95–110)
CHOLEST SERPL-MCNC: 102 MG/DL (ref 120–199)
CHOLEST/HDLC SERPL: 2.4 {RATIO} (ref 2–5)
CO2 SERPL-SCNC: 23 MMOL/L (ref 23–29)
CREAT SERPL-MCNC: 1.4 MG/DL (ref 0.5–1.4)
EST. GFR  (AFRICAN AMERICAN): 56 ML/MIN/1.73 M^2
EST. GFR  (NON AFRICAN AMERICAN): 48.5 ML/MIN/1.73 M^2
ESTIMATED AVG GLUCOSE: 105 MG/DL (ref 68–131)
GLUCOSE SERPL-MCNC: 79 MG/DL (ref 70–110)
HBA1C MFR BLD HPLC: 5.3 % (ref 4–5.6)
HDLC SERPL-MCNC: 42 MG/DL (ref 40–75)
HDLC SERPL: 41.2 % (ref 20–50)
LDLC SERPL CALC-MCNC: 50.8 MG/DL (ref 63–159)
LH SERPL-ACNC: 0.2 MIU/ML (ref 0.6–12.1)
NONHDLC SERPL-MCNC: 60 MG/DL
POTASSIUM SERPL-SCNC: 4.5 MMOL/L (ref 3.5–5.1)
PROT SERPL-MCNC: 6.9 G/DL (ref 6–8.4)
SODIUM SERPL-SCNC: 136 MMOL/L (ref 136–145)
T3FREE SERPL-MCNC: 2.1 PG/ML (ref 2.3–4.2)
T4 FREE SERPL-MCNC: 1.05 NG/DL (ref 0.71–1.51)
TRIGL SERPL-MCNC: 46 MG/DL (ref 30–150)
TSH SERPL DL<=0.005 MIU/L-ACNC: 1.7 UIU/ML (ref 0.4–4)

## 2019-05-30 PROCEDURE — 84403 ASSAY OF TOTAL TESTOSTERONE: CPT

## 2019-05-30 PROCEDURE — 83036 HEMOGLOBIN GLYCOSYLATED A1C: CPT

## 2019-05-30 PROCEDURE — 84402 ASSAY OF FREE TESTOSTERONE: CPT

## 2019-05-30 PROCEDURE — 84439 ASSAY OF FREE THYROXINE: CPT

## 2019-05-30 PROCEDURE — 83002 ASSAY OF GONADOTROPIN (LH): CPT

## 2019-05-30 PROCEDURE — 80061 LIPID PANEL: CPT

## 2019-05-30 PROCEDURE — 80053 COMPREHEN METABOLIC PANEL: CPT

## 2019-05-30 PROCEDURE — 84443 ASSAY THYROID STIM HORMONE: CPT

## 2019-05-30 PROCEDURE — 36415 COLL VENOUS BLD VENIPUNCTURE: CPT | Mod: PO

## 2019-05-30 PROCEDURE — 82306 VITAMIN D 25 HYDROXY: CPT

## 2019-05-30 PROCEDURE — 84481 FREE ASSAY (FT-3): CPT

## 2019-05-31 LAB
25(OH)D3+25(OH)D2 SERPL-MCNC: 38 NG/ML (ref 30–96)
TESTOST SERPL-MCNC: >1500 NG/DL (ref 304–1227)

## 2019-05-31 NOTE — TELEPHONE ENCOUNTER
Call placed to patient, reports struggling with depression and anxiety since breakup.   States he does not need a same day appointment, and will be okay waiting until Monday.

## 2019-06-03 ENCOUNTER — OFFICE VISIT (OUTPATIENT)
Dept: FAMILY MEDICINE | Facility: CLINIC | Age: 74
End: 2019-06-03
Payer: MEDICARE

## 2019-06-03 VITALS
DIASTOLIC BLOOD PRESSURE: 66 MMHG | TEMPERATURE: 98 F | WEIGHT: 167.13 LBS | HEIGHT: 68 IN | SYSTOLIC BLOOD PRESSURE: 130 MMHG | BODY MASS INDEX: 25.33 KG/M2 | OXYGEN SATURATION: 97 % | HEART RATE: 76 BPM

## 2019-06-03 DIAGNOSIS — F41.9 ANXIETY: ICD-10-CM

## 2019-06-03 DIAGNOSIS — I10 ESSENTIAL HYPERTENSION: ICD-10-CM

## 2019-06-03 DIAGNOSIS — I25.10 CORONARY ARTERY DISEASE INVOLVING NATIVE CORONARY ARTERY WITHOUT ANGINA PECTORIS, UNSPECIFIED WHETHER NATIVE OR TRANSPLANTED HEART: ICD-10-CM

## 2019-06-03 DIAGNOSIS — F41.9 ANXIETY: Primary | ICD-10-CM

## 2019-06-03 DIAGNOSIS — G47.00 INSOMNIA, UNSPECIFIED TYPE: ICD-10-CM

## 2019-06-03 PROCEDURE — 99999 PR PBB SHADOW E&M-EST. PATIENT-LVL IV: CPT | Mod: PBBFAC,,, | Performed by: NURSE PRACTITIONER

## 2019-06-03 PROCEDURE — 3078F PR MOST RECENT DIASTOLIC BLOOD PRESSURE < 80 MM HG: ICD-10-PCS | Mod: CPTII,S$GLB,, | Performed by: NURSE PRACTITIONER

## 2019-06-03 PROCEDURE — 99499 RISK ADDL DX/OHS AUDIT: ICD-10-PCS | Mod: S$GLB,,, | Performed by: NURSE PRACTITIONER

## 2019-06-03 PROCEDURE — 3075F SYST BP GE 130 - 139MM HG: CPT | Mod: CPTII,S$GLB,, | Performed by: NURSE PRACTITIONER

## 2019-06-03 PROCEDURE — 1101F PT FALLS ASSESS-DOCD LE1/YR: CPT | Mod: CPTII,S$GLB,, | Performed by: NURSE PRACTITIONER

## 2019-06-03 PROCEDURE — 3078F DIAST BP <80 MM HG: CPT | Mod: CPTII,S$GLB,, | Performed by: NURSE PRACTITIONER

## 2019-06-03 PROCEDURE — 99214 OFFICE O/P EST MOD 30 MIN: CPT | Mod: S$GLB,,, | Performed by: NURSE PRACTITIONER

## 2019-06-03 PROCEDURE — 99499 UNLISTED E&M SERVICE: CPT | Mod: S$GLB,,, | Performed by: NURSE PRACTITIONER

## 2019-06-03 PROCEDURE — 99999 PR PBB SHADOW E&M-EST. PATIENT-LVL IV: ICD-10-PCS | Mod: PBBFAC,,, | Performed by: NURSE PRACTITIONER

## 2019-06-03 PROCEDURE — 1101F PR PT FALLS ASSESS DOC 0-1 FALLS W/OUT INJ PAST YR: ICD-10-PCS | Mod: CPTII,S$GLB,, | Performed by: NURSE PRACTITIONER

## 2019-06-03 PROCEDURE — 99214 PR OFFICE/OUTPT VISIT, EST, LEVL IV, 30-39 MIN: ICD-10-PCS | Mod: S$GLB,,, | Performed by: NURSE PRACTITIONER

## 2019-06-03 PROCEDURE — 3075F PR MOST RECENT SYSTOLIC BLOOD PRESS GE 130-139MM HG: ICD-10-PCS | Mod: CPTII,S$GLB,, | Performed by: NURSE PRACTITIONER

## 2019-06-03 RX ORDER — BUSPIRONE HYDROCHLORIDE 7.5 MG/1
TABLET ORAL
Qty: 270 TABLET | Refills: 1 | OUTPATIENT
Start: 2019-06-03

## 2019-06-03 RX ORDER — BUSPIRONE HYDROCHLORIDE 7.5 MG/1
7.5 TABLET ORAL 3 TIMES DAILY
Qty: 90 TABLET | Refills: 1 | Status: SHIPPED | OUTPATIENT
Start: 2019-06-03 | End: 2020-12-28

## 2019-06-03 RX ORDER — HYDROXYZINE PAMOATE 25 MG/1
25 CAPSULE ORAL NIGHTLY PRN
Qty: 30 CAPSULE | Refills: 0 | Status: SHIPPED | OUTPATIENT
Start: 2019-06-03 | End: 2019-07-09 | Stop reason: CLARIF
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
## 2019-06-03 NOTE — PATIENT INSTRUCTIONS
Buspar take 7.5 twice day  For 1 week   Then increase to the three time a day     Take vistaril at night only to sleep

## 2019-06-04 ENCOUNTER — PATIENT MESSAGE (OUTPATIENT)
Dept: FAMILY MEDICINE | Facility: CLINIC | Age: 74
End: 2019-06-04

## 2019-06-04 DIAGNOSIS — F41.9 ANXIETY: ICD-10-CM

## 2019-06-04 DIAGNOSIS — G47.00 INSOMNIA, UNSPECIFIED TYPE: ICD-10-CM

## 2019-06-04 DIAGNOSIS — F41.9 ANXIETY: Primary | ICD-10-CM

## 2019-06-04 LAB — TESTOST FREE SERPL-MCNC: 48.8 PG/ML

## 2019-06-04 RX ORDER — TRAZODONE HYDROCHLORIDE 50 MG/1
50 TABLET ORAL NIGHTLY
Qty: 30 TABLET | Refills: 0 | Status: SHIPPED | OUTPATIENT
Start: 2019-06-04 | End: 2019-06-04 | Stop reason: SDUPTHER

## 2019-06-04 RX ORDER — TRAZODONE HYDROCHLORIDE 50 MG/1
TABLET ORAL
Qty: 90 TABLET | Refills: 0 | Status: SHIPPED | OUTPATIENT
Start: 2019-06-04 | End: 2019-07-01 | Stop reason: SDUPTHER

## 2019-06-04 NOTE — TELEPHONE ENCOUNTER
----- Message from Makenzie Reyes sent at 6/4/2019  2:09 PM CDT -----  Type:  Patient Returning Call    Who Called:  self  Who Left Message for Patient:  Patricia  Does the patient know what this is regarding?:  yes  Best Call Back Number:  [phone]  Additional Information:  Patient returning call. Thanks!

## 2019-06-21 RX ORDER — PRAVASTATIN SODIUM 40 MG/1
TABLET ORAL
Qty: 90 TABLET | Refills: 0 | Status: SHIPPED | OUTPATIENT
Start: 2019-06-21 | End: 2019-09-19 | Stop reason: SDUPTHER

## 2019-07-01 DIAGNOSIS — G47.00 INSOMNIA, UNSPECIFIED TYPE: ICD-10-CM

## 2019-07-01 DIAGNOSIS — F41.9 ANXIETY: ICD-10-CM

## 2019-07-01 RX ORDER — TRAZODONE HYDROCHLORIDE 50 MG/1
TABLET ORAL
Qty: 30 TABLET | Refills: 4 | Status: SHIPPED | OUTPATIENT
Start: 2019-07-01 | End: 2020-12-28

## 2019-07-09 ENCOUNTER — OFFICE VISIT (OUTPATIENT)
Dept: FAMILY MEDICINE | Facility: CLINIC | Age: 74
End: 2019-07-09
Payer: MEDICARE

## 2019-07-09 VITALS
BODY MASS INDEX: 24.02 KG/M2 | DIASTOLIC BLOOD PRESSURE: 82 MMHG | OXYGEN SATURATION: 98 % | HEIGHT: 68 IN | TEMPERATURE: 99 F | HEART RATE: 64 BPM | WEIGHT: 158.5 LBS | SYSTOLIC BLOOD PRESSURE: 160 MMHG

## 2019-07-09 DIAGNOSIS — M54.50 ACUTE BILATERAL LOW BACK PAIN WITHOUT SCIATICA: Primary | ICD-10-CM

## 2019-07-09 DIAGNOSIS — I10 ESSENTIAL HYPERTENSION: ICD-10-CM

## 2019-07-09 DIAGNOSIS — I49.5 SSS (SICK SINUS SYNDROME): ICD-10-CM

## 2019-07-09 DIAGNOSIS — I25.10 CORONARY ARTERY DISEASE INVOLVING NATIVE CORONARY ARTERY WITHOUT ANGINA PECTORIS, UNSPECIFIED WHETHER NATIVE OR TRANSPLANTED HEART: ICD-10-CM

## 2019-07-09 DIAGNOSIS — M54.50 ACUTE BILATERAL LOW BACK PAIN WITHOUT SCIATICA: ICD-10-CM

## 2019-07-09 PROCEDURE — 3079F DIAST BP 80-89 MM HG: CPT | Mod: CPTII,S$GLB,, | Performed by: NURSE PRACTITIONER

## 2019-07-09 PROCEDURE — 99499 RISK ADDL DX/OHS AUDIT: ICD-10-PCS | Mod: S$GLB,,, | Performed by: NURSE PRACTITIONER

## 2019-07-09 PROCEDURE — 99999 PR PBB SHADOW E&M-EST. PATIENT-LVL IV: ICD-10-PCS | Mod: PBBFAC,,, | Performed by: NURSE PRACTITIONER

## 2019-07-09 PROCEDURE — 99499 UNLISTED E&M SERVICE: CPT | Mod: S$GLB,,, | Performed by: NURSE PRACTITIONER

## 2019-07-09 PROCEDURE — 1101F PR PT FALLS ASSESS DOC 0-1 FALLS W/OUT INJ PAST YR: ICD-10-PCS | Mod: CPTII,S$GLB,, | Performed by: NURSE PRACTITIONER

## 2019-07-09 PROCEDURE — 1101F PT FALLS ASSESS-DOCD LE1/YR: CPT | Mod: CPTII,S$GLB,, | Performed by: NURSE PRACTITIONER

## 2019-07-09 PROCEDURE — 99214 OFFICE O/P EST MOD 30 MIN: CPT | Mod: S$GLB,,, | Performed by: NURSE PRACTITIONER

## 2019-07-09 PROCEDURE — 99214 PR OFFICE/OUTPT VISIT, EST, LEVL IV, 30-39 MIN: ICD-10-PCS | Mod: S$GLB,,, | Performed by: NURSE PRACTITIONER

## 2019-07-09 PROCEDURE — 99999 PR PBB SHADOW E&M-EST. PATIENT-LVL IV: CPT | Mod: PBBFAC,,, | Performed by: NURSE PRACTITIONER

## 2019-07-09 PROCEDURE — 3077F SYST BP >= 140 MM HG: CPT | Mod: CPTII,S$GLB,, | Performed by: NURSE PRACTITIONER

## 2019-07-09 PROCEDURE — 3077F PR MOST RECENT SYSTOLIC BLOOD PRESSURE >= 140 MM HG: ICD-10-PCS | Mod: CPTII,S$GLB,, | Performed by: NURSE PRACTITIONER

## 2019-07-09 PROCEDURE — 3079F PR MOST RECENT DIASTOLIC BLOOD PRESSURE 80-89 MM HG: ICD-10-PCS | Mod: CPTII,S$GLB,, | Performed by: NURSE PRACTITIONER

## 2019-07-09 RX ORDER — TIZANIDINE 2 MG/1
TABLET ORAL
Qty: 90 TABLET | Refills: 0 | OUTPATIENT
Start: 2019-07-09

## 2019-07-09 RX ORDER — TIZANIDINE 2 MG/1
2 TABLET ORAL NIGHTLY PRN
Qty: 20 TABLET | Refills: 0 | Status: SHIPPED | OUTPATIENT
Start: 2019-07-09 | End: 2019-07-26 | Stop reason: SDUPTHER

## 2019-07-09 NOTE — PROGRESS NOTES
Subjective:       Patient ID: José Miguel Austin is a 74 y.o. male.    Chief Complaint: Back Pain (started 1 1/2 weeks ago)    History of back surgery -he didn't have a fusion - lumbar laminectomy     He was working out and feeling back pain and stiff- over the last week - he has tried NSAIDS with relief - no radicular pain.   Taking aleve 1 day   Taking his bP at home and running 120-140/80s at home     Back Pain   This is a new problem. The current episode started 1 to 4 weeks ago (working out  ). The problem occurs daily. The problem has been gradually improving since onset. The pain is present in the lumbar spine. The quality of the pain is described as aching and shooting. The pain does not radiate. The pain is at a severity of 6/10. The pain is moderate. The pain is worse during the day. The symptoms are aggravated by sitting. Pertinent negatives include no abdominal pain, bladder incontinence, bowel incontinence, chest pain, dysuria, fever, headaches, leg pain, numbness, paresis, paresthesias, pelvic pain, perianal numbness, tingling, weakness or weight loss. He has tried NSAIDs and analgesics for the symptoms.     Vitals:    07/09/19 0855   BP: (!) 160/82   Pulse: 64   Temp: 98.6 °F (37 °C)     Review of Systems   Constitutional: Positive for activity change and appetite change. Negative for fatigue, fever and weight loss.   HENT: Negative.    Eyes: Negative.    Respiratory: Negative.  Negative for cough and shortness of breath.    Cardiovascular: Negative.  Negative for chest pain.   Gastrointestinal: Negative.  Negative for abdominal pain, bowel incontinence, diarrhea and nausea.   Endocrine: Negative.    Genitourinary: Negative.  Negative for bladder incontinence, dysuria, hematuria and pelvic pain.   Musculoskeletal: Positive for arthralgias, back pain and myalgias.   Skin: Negative.  Negative for color change and rash.   Allergic/Immunologic: Negative.    Neurological: Negative.  Negative for tingling,  weakness, numbness, headaches and paresthesias.   Hematological: Negative.        Past Medical History:   Diagnosis Date    Basal cell carcinoma     CAD (coronary artery disease)     Hypertension        Objective:      Physical Exam   Constitutional: He is oriented to person, place, and time. He appears well-developed and well-nourished.   HENT:   Head: Normocephalic and atraumatic.   Right Ear: Hearing, tympanic membrane, external ear and ear canal normal.   Left Ear: Hearing, tympanic membrane, external ear and ear canal normal.   Nose: Nose normal. No mucosal edema or rhinorrhea. Right sinus exhibits no maxillary sinus tenderness and no frontal sinus tenderness. Left sinus exhibits no maxillary sinus tenderness and no frontal sinus tenderness.   Mouth/Throat: Uvula is midline, oropharynx is clear and moist and mucous membranes are normal.   Eyes: Pupils are equal, round, and reactive to light. Conjunctivae and EOM are normal.   Neck: Normal range of motion. Neck supple.   Cardiovascular: Normal rate, regular rhythm, normal heart sounds and intact distal pulses. Exam reveals no friction rub.   No murmur heard.  Pulmonary/Chest: Effort normal and breath sounds normal. No stridor. No respiratory distress. He has no wheezes.   Abdominal: Soft. Bowel sounds are normal.   Musculoskeletal: Normal range of motion. He exhibits no edema.        Thoracic back: He exhibits pain and spasm.        Arms:  No radicular pain    Neurological: He is alert and oriented to person, place, and time. No cranial nerve deficit.   Skin: Skin is warm and dry.   Psychiatric: He has a normal mood and affect. His behavior is normal. Judgment and thought content normal.   Nursing note and vitals reviewed.      Assessment:       1. Acute bilateral low back pain without sciatica    2. Essential hypertension    3. Coronary artery disease involving native coronary artery without angina pectoris, unspecified whether native or transplanted heart     4. SSS (sick sinus syndrome)        Plan:       Acute bilateral low back pain without sciatica  -     tiZANidine (ZANAFLEX) 2 MG tablet; Take 1 tablet (2 mg total) by mouth nightly as needed.  Dispense: 20 tablet; Refill: 0  Stretch.  Heat    Essential hypertension  Not on any medications this has been monitored and discussed.  Discussed with him that he really needs to monitor his blood pressure at home and send us updated blood pressure    Coronary artery disease involving native coronary artery without angina pectoris, unspecified whether native or transplanted heart  SSS (sick sinus syndrome)  Follows with Cardiology.  Pacemaker for sick sinus syndrome.      Discussed that based on findings.  Do not feel like any imaging is warranted at this point          Follow-up if not better.

## 2019-07-23 ENCOUNTER — OFFICE VISIT (OUTPATIENT)
Dept: FAMILY MEDICINE | Facility: CLINIC | Age: 74
End: 2019-07-23
Payer: MEDICARE

## 2019-07-23 VITALS
OXYGEN SATURATION: 99 % | DIASTOLIC BLOOD PRESSURE: 72 MMHG | TEMPERATURE: 98 F | SYSTOLIC BLOOD PRESSURE: 128 MMHG | BODY MASS INDEX: 23.76 KG/M2 | HEART RATE: 62 BPM | WEIGHT: 156.75 LBS | HEIGHT: 68 IN

## 2019-07-23 DIAGNOSIS — Z86.79 HISTORY OF HYPERTENSION: Primary | ICD-10-CM

## 2019-07-23 PROCEDURE — 1101F PT FALLS ASSESS-DOCD LE1/YR: CPT | Mod: CPTII,S$GLB,, | Performed by: FAMILY MEDICINE

## 2019-07-23 PROCEDURE — 3078F PR MOST RECENT DIASTOLIC BLOOD PRESSURE < 80 MM HG: ICD-10-PCS | Mod: CPTII,S$GLB,, | Performed by: FAMILY MEDICINE

## 2019-07-23 PROCEDURE — 3074F PR MOST RECENT SYSTOLIC BLOOD PRESSURE < 130 MM HG: ICD-10-PCS | Mod: CPTII,S$GLB,, | Performed by: FAMILY MEDICINE

## 2019-07-23 PROCEDURE — 99213 OFFICE O/P EST LOW 20 MIN: CPT | Mod: S$GLB,,, | Performed by: FAMILY MEDICINE

## 2019-07-23 PROCEDURE — 99999 PR PBB SHADOW E&M-EST. PATIENT-LVL III: ICD-10-PCS | Mod: PBBFAC,,, | Performed by: FAMILY MEDICINE

## 2019-07-23 PROCEDURE — 99213 PR OFFICE/OUTPT VISIT, EST, LEVL III, 20-29 MIN: ICD-10-PCS | Mod: S$GLB,,, | Performed by: FAMILY MEDICINE

## 2019-07-23 PROCEDURE — 1101F PR PT FALLS ASSESS DOC 0-1 FALLS W/OUT INJ PAST YR: ICD-10-PCS | Mod: CPTII,S$GLB,, | Performed by: FAMILY MEDICINE

## 2019-07-23 PROCEDURE — 3074F SYST BP LT 130 MM HG: CPT | Mod: CPTII,S$GLB,, | Performed by: FAMILY MEDICINE

## 2019-07-23 PROCEDURE — 99999 PR PBB SHADOW E&M-EST. PATIENT-LVL III: CPT | Mod: PBBFAC,,, | Performed by: FAMILY MEDICINE

## 2019-07-23 PROCEDURE — 3078F DIAST BP <80 MM HG: CPT | Mod: CPTII,S$GLB,, | Performed by: FAMILY MEDICINE

## 2019-07-23 NOTE — PROGRESS NOTES
74 year old male patient presents for f/u on elevated blood pressure.  Bp was elevated during recent dental procedures.  His home BP have been 104-168/57-82  Denies any HA, dizziness or visual changes.  S/p pacemaker placement for sick sinus syndrome.  He is overall feeling well with no specific complaints today.    He is active with exercise.    Back pain has resolved.    Getting testosterone injections monthly and following with Dr. Barrientos.   LUCINDA s/p stents - taking aspirin 81mg weekly; following with Dr. Rothman, tolerating Pravachol 40mg daily  Hypogonadism - following with Dr. Barrientos  He exercises regularly and works out at the gym.   Taking DHEA 25mg BID now which has helped with energy.      Past Medical History:   Diagnosis Date    Basal cell carcinoma     CAD (coronary artery disease)     Hypertension        Past Surgical History:   Procedure Laterality Date    APPENDECTOMY      BASAL CELL CARCINOMA EXCISION      left forehead    CARDIAC PACEMAKER PLACEMENT      CIRCUMCISION      CORONARY STENT PLACEMENT      4 stents in RCA    HEART CATH-LEFT Left 9/11/2015    [facility]    inguinal hernia      INSERTION-PACEMAKER-DUAL   N/A 3/12/2015    [facility] C[facility]    LUMBAR DISC SURGERY  1998    PLACEMENT-LOOP RECORDER  [employer] N/A 3/3/2015    [facility] CATH LAB       Allergies   Allergen Reactions    Hydrocodone Nausea Only       Social History     Socioeconomic History    Marital status:      Spouse name: Not on file    Number of children: 3    Years of education: Not on file    Highest education level: Not on file   Occupational History    Occupation: retired refinery     Occupation: 8,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     Comment: resting heart rate 50    Occupation: cycles [place]    Occupation: 3 times a week gym   Social  Needs    Financial resource strain: Not very hard    Food insecurity:     Worry: Never true     Inability: Never true    Transportation needs:     Medical: No     Non-medical: No   Tobacco Use    Smoking status: Never Smoker    Smokeless tobacco: Never Used   Substance and Sexual Activity    Alcohol use: No     Frequency: Never     Drinks per session: Patient refused     Binge frequency: Never    Drug use: No    Sexual activity: Not on file   Lifestyle    Physical activity:     Days per week: 7 days     Minutes per session: 60 min    Stress: Very much   Relationships    Social connections:     Talks on phone: Three times a week     Gets together: Three times a week     Attends [employer] service: Not on file     Active member of club or organization: No     Attends meetings of clubs or organizations: Never     Relationship status:    Other Topics Concern    Not on file   Social History Narrative    Exercise: gym 2-3 times per week        Cycles 5 days a week       Current Outpatient Medications on File Prior to Visit   Medication Sig Dispense Refill    alprostadil (EDEX) 20 mcg injection 20 mcg by Intracavitary route as needed for Erectile Dysfunction. use no more than 3 times per week 5 vial 12    busPIRone (BUSPAR) 7.5 MG tablet Take 1 tablet (7.5 mg total) by mouth 3 (three) times daily. 90 tablet 1    prasterone, dhea, (DHEA) 50 mg Tab Take by mouth once daily.      pravastatin (PRAVACHOL) 40 MG tablet TAKE 1 TABLET BY MOUTH EVERY DAY 90 tablet 0    testosterone cypionate (DEPOTESTOTERONE CYPIONATE) 200 mg/mL injection Inject 200 mg into the muscle every 30 days.       traZODone (DESYREL) 50 MG tablet TAKE 1 TABLET(50 MG) BY MOUTH EVERY EVENING 30 tablet 4    aspirin (ECOTRIN) 81 MG EC tablet Take 1 tablet (81 mg total) by mouth once daily. (Patient taking differently: Take 81 mg by mouth. Weekly  )  0     No current facility-administered medications on file prior to visit.   "      Family History   Problem Relation Age of Onset    Heart disease Mother     Heart disease Father     Heart disease Sister     Heart disease Brother        ROS:  GENERAL: No fever, chills, or weight loss. C/o persistent fatigue and feeling down.  SKIN: No rashes, itching or changes in color or texture of skin.  HEAD: No headaches or recent head trauma.  EYES: Visual acuity fine. No photophobia, ocular pain or diplopia.  EARS: Denies ear pain, discharge or vertigo.  NOSE: No loss of smell, no epistaxis or postnasal drip.  MOUTH & THROAT: No hoarseness or change in voice. No excessive gum bleeding.  NODES: Denies swollen glands.  CHEST: Denies SANON, cyanosis, wheezing, cough and sputum production.  CARDIOVASCULAR: Denies chest pain, PND, orthopnea or reduced exercise tolerance. C/o dizziness.  ABDOMEN: Appetite fine. No weight loss. Denies diarrhea, abdominal pain, hematemesis or blood in stool.  URINARY: No flank pain, dysuria or hematuria.  PERIPHERAL VASCULAR: No claudication or cyanosis.  MUSCULOSKELETAL: No joint stiffness or swelling. Denies back pain.  NEUROLOGIC: No history of seizures, paralysis, alteration of gait or coordination.    PE:   /72 (BP Location: Left arm, Patient Position: Sitting)   Pulse 62   Temp 98.4 °F (36.9 °C) (Oral)   Ht 5' 8" (1.727 m)   Wt 71.1 kg (156 lb 12 oz)   SpO2 99%   BMI 23.83 kg/m²     APPEARANCE: Well nourished, well developed, in no acute distress.   SKIN: Warm, dry. Large ecchymotic area dorsal surface right hand.   HEENT: Normocephalic, atraumatic. No pharyngeal erythema or exudate. No stridor.   NECK: Supple. NO adenopathy or thyromegaly.   CHEST: Lungs clear to auscultation. No adventitious sounds.   CARDIOVASCULAR: Normal S1, S2. No rubs, murmurs or gallops. Brisk carotid pulses without bruits.   ABDOMEN: Bowel sounds normal. Not distended. Soft. No tenderness or masses.  PERIPHERAL VASCULAR: Femoral pulses present and symmetrical. No " edema.  MUSCULOSKELETAL: Unremarkable. There is no joint erythema or warmth noted and there is normal range of motion of all major joints.  BACK: No CVA tenderness. There is no spasm, tenderness or radiculopathy noted with palpation and there is full range of motion.   NEUROLOGIC: Cranial Nerves: II-XII grossly intact.  Motor: 5/5 strength major flexors/extensors. No tremor.  Gait & Posture: Normal gait and fine motion. No cerebellar signs.  Psych: mood euthymic; insight and judgement normal    Results for orders placed or performed in visit on 05/30/19   Hemoglobin A1c   Result Value Ref Range    Hemoglobin A1C 5.3 4.0 - 5.6 %    Estimated Avg Glucose 105 68 - 131 mg/dL   Comprehensive metabolic panel   Result Value Ref Range    Sodium 136 136 - 145 mmol/L    Potassium 4.5 3.5 - 5.1 mmol/L    Chloride 102 95 - 110 mmol/L    CO2 23 23 - 29 mmol/L    Glucose 79 70 - 110 mg/dL    BUN, Bld 15 8 - 23 mg/dL    Creatinine 1.4 0.5 - 1.4 mg/dL    Calcium 9.2 8.7 - 10.5 mg/dL    Total Protein 6.9 6.0 - 8.4 g/dL    Albumin 3.3 (L) 3.5 - 5.2 g/dL    Total Bilirubin 0.6 0.1 - 1.0 mg/dL    Alkaline Phosphatase 63 55 - 135 U/L    AST 35 10 - 40 U/L    ALT 25 10 - 44 U/L    Anion Gap 11 8 - 16 mmol/L    eGFR if African American 56.0 (A) >60 mL/min/1.73 m^2    eGFR if non  48.5 (A) >60 mL/min/1.73 m^2   Lipid panel   Result Value Ref Range    Cholesterol 102 (L) 120 - 199 mg/dL    Triglycerides 46 30 - 150 mg/dL    HDL 42 40 - 75 mg/dL    LDL Cholesterol 50.8 (L) 63.0 - 159.0 mg/dL    Hdl/Cholesterol Ratio 41.2 20.0 - 50.0 %    Total Cholesterol/HDL Ratio 2.4 2.0 - 5.0    Non-HDL Cholesterol 60 mg/dL   T3, free   Result Value Ref Range    T3, Free 2.1 (L) 2.3 - 4.2 pg/mL   T4, free   Result Value Ref Range    Free T4 1.05 0.71 - 1.51 ng/dL   TSH   Result Value Ref Range    TSH 1.697 0.400 - 4.000 uIU/mL   LUTEINIZING HORMONE   Result Value Ref Range    LH 0.2 (L) 0.6 - 12.1 mIU/mL   Testosterone   Result Value Ref  Range    Testosterone, Total >1500 (H) 304 - 1227 ng/dL   Testosterone, free   Result Value Ref Range    Testosterone, Free 48.8 pg/mL   Vitamin D   Result Value Ref Range    Vit D, 25-Hydroxy 38 30 - 96 ng/mL       History of hypertension      Reassurance  No indications for BP medication at this time  Continue present meds  Counselled on regular exercise, maintenance of a healthy weight, balanced diet rich in fruits/vegetables, avoidance of unhealthy habits.  Continue present testosterone replacement and endocrinology follow-up  F/u for routine physical as planned

## 2019-07-26 DIAGNOSIS — M54.50 ACUTE BILATERAL LOW BACK PAIN WITHOUT SCIATICA: ICD-10-CM

## 2019-07-26 RX ORDER — TIZANIDINE 2 MG/1
TABLET ORAL
Qty: 20 TABLET | Refills: 0 | Status: SHIPPED | OUTPATIENT
Start: 2019-07-26 | End: 2019-08-13 | Stop reason: SDUPTHER

## 2019-08-13 DIAGNOSIS — M54.50 ACUTE BILATERAL LOW BACK PAIN WITHOUT SCIATICA: ICD-10-CM

## 2019-08-13 RX ORDER — TIZANIDINE 2 MG/1
TABLET ORAL
Qty: 40 TABLET | Refills: 1 | Status: SHIPPED | OUTPATIENT
Start: 2019-08-13 | End: 2020-12-28

## 2019-08-17 DIAGNOSIS — Z95.0 PACEMAKER: Primary | ICD-10-CM

## 2019-08-17 DIAGNOSIS — I49.5 SSS (SICK SINUS SYNDROME): ICD-10-CM

## 2019-09-19 RX ORDER — PRAVASTATIN SODIUM 40 MG/1
TABLET ORAL
Qty: 90 TABLET | Refills: 0 | Status: SHIPPED | OUTPATIENT
Start: 2019-09-19 | End: 2019-10-29 | Stop reason: SDUPTHER

## 2019-10-01 ENCOUNTER — CLINICAL SUPPORT (OUTPATIENT)
Dept: CARDIOLOGY | Facility: CLINIC | Age: 74
End: 2019-10-01
Attending: INTERNAL MEDICINE
Payer: MEDICARE

## 2019-10-01 ENCOUNTER — OFFICE VISIT (OUTPATIENT)
Dept: CARDIOLOGY | Facility: CLINIC | Age: 74
End: 2019-10-01
Payer: MEDICARE

## 2019-10-01 VITALS
DIASTOLIC BLOOD PRESSURE: 80 MMHG | SYSTOLIC BLOOD PRESSURE: 163 MMHG | HEART RATE: 54 BPM | WEIGHT: 156.5 LBS | BODY MASS INDEX: 23.72 KG/M2 | HEIGHT: 68 IN

## 2019-10-01 DIAGNOSIS — Z95.0 PACEMAKER: ICD-10-CM

## 2019-10-01 DIAGNOSIS — Z95.5 S/P CORONARY ARTERY STENT PLACEMENT: ICD-10-CM

## 2019-10-01 DIAGNOSIS — I10 ESSENTIAL HYPERTENSION: ICD-10-CM

## 2019-10-01 DIAGNOSIS — I25.10 CORONARY ARTERY DISEASE INVOLVING NATIVE CORONARY ARTERY WITHOUT ANGINA PECTORIS, UNSPECIFIED WHETHER NATIVE OR TRANSPLANTED HEART: ICD-10-CM

## 2019-10-01 DIAGNOSIS — I49.5 SSS (SICK SINUS SYNDROME): ICD-10-CM

## 2019-10-01 DIAGNOSIS — I49.5 SSS (SICK SINUS SYNDROME): Primary | ICD-10-CM

## 2019-10-01 PROCEDURE — 1101F PT FALLS ASSESS-DOCD LE1/YR: CPT | Mod: CPTII,S$GLB,, | Performed by: INTERNAL MEDICINE

## 2019-10-01 PROCEDURE — 99499 UNLISTED E&M SERVICE: CPT | Mod: S$GLB,,, | Performed by: INTERNAL MEDICINE

## 2019-10-01 PROCEDURE — 3077F PR MOST RECENT SYSTOLIC BLOOD PRESSURE >= 140 MM HG: ICD-10-PCS | Mod: CPTII,S$GLB,, | Performed by: INTERNAL MEDICINE

## 2019-10-01 PROCEDURE — 99214 PR OFFICE/OUTPT VISIT, EST, LEVL IV, 30-39 MIN: ICD-10-PCS | Mod: S$GLB,,, | Performed by: INTERNAL MEDICINE

## 2019-10-01 PROCEDURE — 99214 OFFICE O/P EST MOD 30 MIN: CPT | Mod: S$GLB,,, | Performed by: INTERNAL MEDICINE

## 2019-10-01 PROCEDURE — 99999 PR PBB SHADOW E&M-EST. PATIENT-LVL III: CPT | Mod: PBBFAC,,, | Performed by: INTERNAL MEDICINE

## 2019-10-01 PROCEDURE — 3079F DIAST BP 80-89 MM HG: CPT | Mod: CPTII,S$GLB,, | Performed by: INTERNAL MEDICINE

## 2019-10-01 PROCEDURE — 3079F PR MOST RECENT DIASTOLIC BLOOD PRESSURE 80-89 MM HG: ICD-10-PCS | Mod: CPTII,S$GLB,, | Performed by: INTERNAL MEDICINE

## 2019-10-01 PROCEDURE — 99499 RISK ADDL DX/OHS AUDIT: ICD-10-PCS | Mod: S$GLB,,, | Performed by: INTERNAL MEDICINE

## 2019-10-01 PROCEDURE — 99999 PR PBB SHADOW E&M-EST. PATIENT-LVL III: ICD-10-PCS | Mod: PBBFAC,,, | Performed by: INTERNAL MEDICINE

## 2019-10-01 PROCEDURE — 3077F SYST BP >= 140 MM HG: CPT | Mod: CPTII,S$GLB,, | Performed by: INTERNAL MEDICINE

## 2019-10-01 PROCEDURE — 1101F PR PT FALLS ASSESS DOC 0-1 FALLS W/OUT INJ PAST YR: ICD-10-PCS | Mod: CPTII,S$GLB,, | Performed by: INTERNAL MEDICINE

## 2019-10-01 RX ORDER — AMOXICILLIN 500 MG
CAPSULE ORAL DAILY
COMMUNITY

## 2019-10-01 RX ORDER — EPINEPHRINE 0.22MG
100 AEROSOL WITH ADAPTER (ML) INHALATION DAILY
COMMUNITY

## 2019-10-01 NOTE — PROGRESS NOTES
Subjective:    Patient ID:  José Miguel Autsin is a 74 y.o. male who presents for follow-up of pacemaker    HPI  He comes for follow up with no major problems, no chest pain, no shortness of breath.      Review of Systems   Constitution: Negative for decreased appetite, malaise/fatigue, weight gain and weight loss.   Cardiovascular: Negative for chest pain, dyspnea on exertion, leg swelling, palpitations and syncope.   Respiratory: Negative for cough and shortness of breath.    Gastrointestinal: Negative.    Neurological: Negative for weakness.   All other systems reviewed and are negative.       Objective:      Physical Exam   Constitutional: He is oriented to person, place, and time. He appears well-developed and well-nourished.   HENT:   Head: Normocephalic.   Eyes: Pupils are equal, round, and reactive to light.   Neck: Normal range of motion. Neck supple. No JVD present. Carotid bruit is not present. No thyromegaly present.   Cardiovascular: Normal rate, regular rhythm, normal heart sounds, intact distal pulses and normal pulses. PMI is not displaced. Exam reveals no gallop.   No murmur heard.  Pulmonary/Chest: Effort normal and breath sounds normal.   Abdominal: Soft. Normal appearance. He exhibits no mass. There is no hepatosplenomegaly. There is no tenderness.   Musculoskeletal: Normal range of motion. He exhibits no edema.   Neurological: He is alert and oriented to person, place, and time. He has normal strength and normal reflexes. No sensory deficit.   Skin: Skin is warm and intact.   Psychiatric: He has a normal mood and affect.   Nursing note and vitals reviewed.        Assessment:       1. SSS (sick sinus syndrome)    2. Coronary artery disease involving native coronary artery without angina pectoris, unspecified whether native or transplanted heart    3. Essential hypertension    4. Pacemaker    5. S/P coronary artery stent placement         Plan:     Continue all cardiac medications  Regular exercise  program  1 yr f/u with gui/sharona

## 2019-10-03 ENCOUNTER — CLINICAL SUPPORT (OUTPATIENT)
Dept: CARDIOLOGY | Facility: HOSPITAL | Age: 74
End: 2019-10-03
Payer: MEDICARE

## 2019-10-03 PROCEDURE — 93296 REM INTERROG EVL PM/IDS: CPT | Performed by: INTERNAL MEDICINE

## 2019-10-03 PROCEDURE — 93294 REM INTERROG EVL PM/LDLS PM: CPT | Mod: ,,, | Performed by: INTERNAL MEDICINE

## 2019-10-03 PROCEDURE — 93294 CARDIAC DEVICE CHECK - REMOTE: ICD-10-PCS | Mod: ,,, | Performed by: INTERNAL MEDICINE

## 2019-10-29 RX ORDER — PRAVASTATIN SODIUM 40 MG/1
40 TABLET ORAL DAILY
Qty: 90 TABLET | Refills: 0 | Status: SHIPPED | OUTPATIENT
Start: 2019-10-29 | End: 2020-04-17

## 2020-01-02 ENCOUNTER — PATIENT OUTREACH (OUTPATIENT)
Dept: ADMINISTRATIVE | Facility: HOSPITAL | Age: 75
End: 2020-01-02

## 2020-02-06 DIAGNOSIS — N52.9 ERECTILE DYSFUNCTION, UNSPECIFIED ERECTILE DYSFUNCTION TYPE: ICD-10-CM

## 2020-02-07 ENCOUNTER — LAB VISIT (OUTPATIENT)
Dept: LAB | Facility: HOSPITAL | Age: 75
End: 2020-02-07
Attending: INTERNAL MEDICINE
Payer: MEDICARE

## 2020-02-07 DIAGNOSIS — E29.1 MALE HYPOGONADISM: ICD-10-CM

## 2020-02-07 DIAGNOSIS — R53.81 MALAISE: ICD-10-CM

## 2020-02-07 DIAGNOSIS — E55.9 AVITAMINOSIS D: ICD-10-CM

## 2020-02-07 DIAGNOSIS — E53.8 BIOTIN-(PROPIONYL-COA-CARBOXYLASE) LIGASE DEFICIENCY: ICD-10-CM

## 2020-02-07 DIAGNOSIS — Z12.5 SPECIAL SCREENING FOR MALIGNANT NEOPLASM OF PROSTATE: Primary | ICD-10-CM

## 2020-02-07 LAB
25(OH)D3+25(OH)D2 SERPL-MCNC: 48 NG/ML (ref 30–96)
ALBUMIN SERPL BCP-MCNC: 3.6 G/DL (ref 3.5–5.2)
ALP SERPL-CCNC: 63 U/L (ref 55–135)
ALT SERPL W/O P-5'-P-CCNC: 25 U/L (ref 10–44)
ANION GAP SERPL CALC-SCNC: 6 MMOL/L (ref 8–16)
AST SERPL-CCNC: 33 U/L (ref 10–40)
BASOPHILS # BLD AUTO: 0.07 K/UL (ref 0–0.2)
BASOPHILS NFR BLD: 1 % (ref 0–1.9)
BILIRUB SERPL-MCNC: 0.7 MG/DL (ref 0.1–1)
BUN SERPL-MCNC: 20 MG/DL (ref 8–23)
CALCIUM SERPL-MCNC: 9.3 MG/DL (ref 8.7–10.5)
CHLORIDE SERPL-SCNC: 103 MMOL/L (ref 95–110)
CHOLEST SERPL-MCNC: 152 MG/DL (ref 120–199)
CHOLEST/HDLC SERPL: 3.5 {RATIO} (ref 2–5)
CK SERPL-CCNC: 228 U/L (ref 20–200)
CO2 SERPL-SCNC: 30 MMOL/L (ref 23–29)
COMPLEXED PSA SERPL-MCNC: 0.71 NG/ML (ref 0–4)
CREAT SERPL-MCNC: 1.3 MG/DL (ref 0.5–1.4)
DIFFERENTIAL METHOD: ABNORMAL
EOSINOPHIL # BLD AUTO: 0.2 K/UL (ref 0–0.5)
EOSINOPHIL NFR BLD: 3.3 % (ref 0–8)
ERYTHROCYTE [DISTWIDTH] IN BLOOD BY AUTOMATED COUNT: 13.6 % (ref 11.5–14.5)
EST. GFR  (AFRICAN AMERICAN): >60 ML/MIN/1.73 M^2
EST. GFR  (NON AFRICAN AMERICAN): 53 ML/MIN/1.73 M^2
ESTIMATED AVG GLUCOSE: 108 MG/DL (ref 68–131)
GLUCOSE SERPL-MCNC: 91 MG/DL (ref 70–110)
HBA1C MFR BLD HPLC: 5.4 % (ref 4–5.6)
HCT VFR BLD AUTO: 51.5 % (ref 40–54)
HDLC SERPL-MCNC: 43 MG/DL (ref 40–75)
HDLC SERPL: 28.3 % (ref 20–50)
HGB BLD-MCNC: 16.8 G/DL (ref 14–18)
IMM GRANULOCYTES # BLD AUTO: 0.02 K/UL (ref 0–0.04)
IMM GRANULOCYTES NFR BLD AUTO: 0.3 % (ref 0–0.5)
LDLC SERPL CALC-MCNC: 93.4 MG/DL (ref 63–159)
LYMPHOCYTES # BLD AUTO: 2.2 K/UL (ref 1–4.8)
LYMPHOCYTES NFR BLD: 32 % (ref 18–48)
MCH RBC QN AUTO: 33.1 PG (ref 27–31)
MCHC RBC AUTO-ENTMCNC: 32.6 G/DL (ref 32–36)
MCV RBC AUTO: 101 FL (ref 82–98)
MONOCYTES # BLD AUTO: 0.9 K/UL (ref 0.3–1)
MONOCYTES NFR BLD: 13.4 % (ref 4–15)
NEUTROPHILS # BLD AUTO: 3.5 K/UL (ref 1.8–7.7)
NEUTROPHILS NFR BLD: 50 % (ref 38–73)
NONHDLC SERPL-MCNC: 109 MG/DL
NRBC BLD-RTO: 0 /100 WBC
PLATELET # BLD AUTO: 267 K/UL (ref 150–350)
PMV BLD AUTO: 10.1 FL (ref 9.2–12.9)
POTASSIUM SERPL-SCNC: 4.4 MMOL/L (ref 3.5–5.1)
PROT SERPL-MCNC: 7.1 G/DL (ref 6–8.4)
RBC # BLD AUTO: 5.08 M/UL (ref 4.6–6.2)
SODIUM SERPL-SCNC: 139 MMOL/L (ref 136–145)
T3FREE SERPL-MCNC: 2.4 PG/ML (ref 2.3–4.2)
T4 FREE SERPL-MCNC: 0.88 NG/DL (ref 0.71–1.51)
TESTOST SERPL-MCNC: 397 NG/DL (ref 304–1227)
TRIGL SERPL-MCNC: 78 MG/DL (ref 30–150)
TSH SERPL DL<=0.005 MIU/L-ACNC: 2.63 UIU/ML (ref 0.4–4)
WBC # BLD AUTO: 6.94 K/UL (ref 3.9–12.7)

## 2020-02-07 PROCEDURE — 84481 FREE ASSAY (FT-3): CPT

## 2020-02-07 PROCEDURE — 82306 VITAMIN D 25 HYDROXY: CPT

## 2020-02-07 PROCEDURE — 84402 ASSAY OF FREE TESTOSTERONE: CPT

## 2020-02-07 PROCEDURE — 85025 COMPLETE CBC W/AUTO DIFF WBC: CPT

## 2020-02-07 PROCEDURE — 84153 ASSAY OF PSA TOTAL: CPT

## 2020-02-07 PROCEDURE — 82550 ASSAY OF CK (CPK): CPT

## 2020-02-07 PROCEDURE — 84403 ASSAY OF TOTAL TESTOSTERONE: CPT

## 2020-02-07 PROCEDURE — 80061 LIPID PANEL: CPT

## 2020-02-07 PROCEDURE — 84439 ASSAY OF FREE THYROXINE: CPT

## 2020-02-07 PROCEDURE — 80053 COMPREHEN METABOLIC PANEL: CPT

## 2020-02-07 PROCEDURE — 83036 HEMOGLOBIN GLYCOSYLATED A1C: CPT

## 2020-02-07 PROCEDURE — 36415 COLL VENOUS BLD VENIPUNCTURE: CPT | Mod: PO

## 2020-02-07 PROCEDURE — 84443 ASSAY THYROID STIM HORMONE: CPT

## 2020-02-11 LAB — TESTOST FREE SERPL-MCNC: 4.3 PG/ML

## 2020-04-17 RX ORDER — PRAVASTATIN SODIUM 40 MG/1
TABLET ORAL
Qty: 90 TABLET | Refills: 0 | Status: SHIPPED | OUTPATIENT
Start: 2020-04-17 | End: 2020-07-15

## 2020-05-05 ENCOUNTER — PATIENT MESSAGE (OUTPATIENT)
Dept: ADMINISTRATIVE | Facility: HOSPITAL | Age: 75
End: 2020-05-05

## 2020-05-18 ENCOUNTER — TELEPHONE (OUTPATIENT)
Dept: CARDIOLOGY | Facility: CLINIC | Age: 75
End: 2020-05-18

## 2020-05-19 DIAGNOSIS — I49.5 SSS (SICK SINUS SYNDROME): ICD-10-CM

## 2020-05-19 DIAGNOSIS — Z95.0 PACEMAKER: Primary | ICD-10-CM

## 2020-06-10 ENCOUNTER — CLINICAL SUPPORT (OUTPATIENT)
Dept: CARDIOLOGY | Facility: CLINIC | Age: 75
End: 2020-06-10
Attending: INTERNAL MEDICINE
Payer: MEDICARE

## 2020-06-10 DIAGNOSIS — I49.5 SSS (SICK SINUS SYNDROME): ICD-10-CM

## 2020-06-10 DIAGNOSIS — Z95.0 PACEMAKER: ICD-10-CM

## 2020-09-11 ENCOUNTER — LAB VISIT (OUTPATIENT)
Dept: LAB | Facility: HOSPITAL | Age: 75
End: 2020-09-11
Attending: INTERNAL MEDICINE
Payer: MEDICARE

## 2020-09-11 DIAGNOSIS — R53.81 DEBILITY: ICD-10-CM

## 2020-09-11 DIAGNOSIS — E55.9 AVITAMINOSIS D: ICD-10-CM

## 2020-09-11 DIAGNOSIS — E53.8 BIOTIN-(PROPIONYL-COA-CARBOXYLASE) LIGASE DEFICIENCY: Primary | ICD-10-CM

## 2020-09-11 DIAGNOSIS — Z12.5 SPECIAL SCREENING FOR MALIGNANT NEOPLASM OF PROSTATE: ICD-10-CM

## 2020-09-11 DIAGNOSIS — E29.1 3-OXO-5 ALPHA-STEROID DELTA 4-DEHYDROGENASE DEFICIENCY: ICD-10-CM

## 2020-09-11 LAB
ALBUMIN SERPL BCP-MCNC: 3.7 G/DL (ref 3.5–5.2)
ALP SERPL-CCNC: 64 U/L (ref 55–135)
ALT SERPL W/O P-5'-P-CCNC: 25 U/L (ref 10–44)
ANION GAP SERPL CALC-SCNC: 6 MMOL/L (ref 8–16)
AST SERPL-CCNC: 33 U/L (ref 10–40)
BASOPHILS # BLD AUTO: 0.06 K/UL (ref 0–0.2)
BASOPHILS NFR BLD: 0.9 % (ref 0–1.9)
BILIRUB SERPL-MCNC: 0.6 MG/DL (ref 0.1–1)
BUN SERPL-MCNC: 20 MG/DL (ref 8–23)
CALCIUM SERPL-MCNC: 9.3 MG/DL (ref 8.7–10.5)
CHLORIDE SERPL-SCNC: 105 MMOL/L (ref 95–110)
CHOLEST SERPL-MCNC: 127 MG/DL (ref 120–199)
CHOLEST/HDLC SERPL: 2.8 {RATIO} (ref 2–5)
CK SERPL-CCNC: 232 U/L (ref 20–200)
CO2 SERPL-SCNC: 28 MMOL/L (ref 23–29)
CREAT SERPL-MCNC: 1.1 MG/DL (ref 0.5–1.4)
DIFFERENTIAL METHOD: ABNORMAL
EOSINOPHIL # BLD AUTO: 0.2 K/UL (ref 0–0.5)
EOSINOPHIL NFR BLD: 2.7 % (ref 0–8)
ERYTHROCYTE [DISTWIDTH] IN BLOOD BY AUTOMATED COUNT: 12.9 % (ref 11.5–14.5)
EST. GFR  (AFRICAN AMERICAN): >60 ML/MIN/1.73 M^2
EST. GFR  (NON AFRICAN AMERICAN): >60 ML/MIN/1.73 M^2
GLUCOSE SERPL-MCNC: 85 MG/DL (ref 70–110)
HCT VFR BLD AUTO: 44.1 % (ref 40–54)
HDLC SERPL-MCNC: 46 MG/DL (ref 40–75)
HDLC SERPL: 36.2 % (ref 20–50)
HGB BLD-MCNC: 14.1 G/DL (ref 14–18)
IMM GRANULOCYTES # BLD AUTO: 0.01 K/UL (ref 0–0.04)
IMM GRANULOCYTES NFR BLD AUTO: 0.1 % (ref 0–0.5)
LDLC SERPL CALC-MCNC: 70 MG/DL (ref 63–159)
LH SERPL-ACNC: 3.9 MIU/ML (ref 0.6–12.1)
LYMPHOCYTES # BLD AUTO: 2.5 K/UL (ref 1–4.8)
LYMPHOCYTES NFR BLD: 37.6 % (ref 18–48)
MCH RBC QN AUTO: 32.8 PG (ref 27–31)
MCHC RBC AUTO-ENTMCNC: 32 G/DL (ref 32–36)
MCV RBC AUTO: 103 FL (ref 82–98)
MONOCYTES # BLD AUTO: 0.8 K/UL (ref 0.3–1)
MONOCYTES NFR BLD: 12.4 % (ref 4–15)
NEUTROPHILS # BLD AUTO: 3.1 K/UL (ref 1.8–7.7)
NEUTROPHILS NFR BLD: 46.3 % (ref 38–73)
NONHDLC SERPL-MCNC: 81 MG/DL
NRBC BLD-RTO: 0 /100 WBC
PLATELET # BLD AUTO: 269 K/UL (ref 150–350)
PMV BLD AUTO: 11 FL (ref 9.2–12.9)
POTASSIUM SERPL-SCNC: 4.4 MMOL/L (ref 3.5–5.1)
PROT SERPL-MCNC: 6.6 G/DL (ref 6–8.4)
RBC # BLD AUTO: 4.3 M/UL (ref 4.6–6.2)
SODIUM SERPL-SCNC: 139 MMOL/L (ref 136–145)
T3FREE SERPL-MCNC: 2.5 PG/ML (ref 2.3–4.2)
T4 FREE SERPL-MCNC: 0.99 NG/DL (ref 0.71–1.51)
TRIGL SERPL-MCNC: 55 MG/DL (ref 30–150)
TSH SERPL DL<=0.005 MIU/L-ACNC: 2.32 UIU/ML (ref 0.4–4)
WBC # BLD AUTO: 6.76 K/UL (ref 3.9–12.7)

## 2020-09-11 PROCEDURE — 84439 ASSAY OF FREE THYROXINE: CPT

## 2020-09-11 PROCEDURE — 84481 FREE ASSAY (FT-3): CPT

## 2020-09-11 PROCEDURE — 83036 HEMOGLOBIN GLYCOSYLATED A1C: CPT | Mod: GA

## 2020-09-11 PROCEDURE — 80061 LIPID PANEL: CPT | Mod: GA

## 2020-09-11 PROCEDURE — 80053 COMPREHEN METABOLIC PANEL: CPT

## 2020-09-11 PROCEDURE — 84443 ASSAY THYROID STIM HORMONE: CPT

## 2020-09-11 PROCEDURE — 84402 ASSAY OF FREE TESTOSTERONE: CPT

## 2020-09-11 PROCEDURE — 82306 VITAMIN D 25 HYDROXY: CPT

## 2020-09-11 PROCEDURE — 85025 COMPLETE CBC W/AUTO DIFF WBC: CPT

## 2020-09-11 PROCEDURE — 84403 ASSAY OF TOTAL TESTOSTERONE: CPT

## 2020-09-11 PROCEDURE — 83002 ASSAY OF GONADOTROPIN (LH): CPT

## 2020-09-11 PROCEDURE — 82550 ASSAY OF CK (CPK): CPT

## 2020-09-12 LAB
25(OH)D3+25(OH)D2 SERPL-MCNC: 91 NG/ML (ref 30–96)
ESTIMATED AVG GLUCOSE: 105 MG/DL (ref 68–131)
HBA1C MFR BLD HPLC: 5.3 % (ref 4–5.6)
TESTOST SERPL-MCNC: 540 NG/DL (ref 304–1227)

## 2020-09-15 LAB — TESTOST FREE SERPL-MCNC: 5.7 PG/ML

## 2020-10-01 ENCOUNTER — CLINICAL SUPPORT (OUTPATIENT)
Dept: CARDIOLOGY | Facility: CLINIC | Age: 75
End: 2020-10-01
Attending: INTERNAL MEDICINE
Payer: MEDICARE

## 2020-10-01 ENCOUNTER — HOSPITAL ENCOUNTER (OUTPATIENT)
Dept: RADIOLOGY | Facility: HOSPITAL | Age: 75
Discharge: HOME | End: 2020-10-01
Attending: INTERNAL MEDICINE
Payer: MEDICARE

## 2020-10-01 VITALS — BODY MASS INDEX: 23.72 KG/M2 | WEIGHT: 156.5 LBS | HEART RATE: 60 BPM | HEIGHT: 68 IN

## 2020-10-01 VITALS — BODY MASS INDEX: 23.64 KG/M2 | WEIGHT: 156 LBS | HEIGHT: 68 IN

## 2020-10-01 DIAGNOSIS — Z95.0 PACEMAKER: ICD-10-CM

## 2020-10-01 DIAGNOSIS — I10 ESSENTIAL HYPERTENSION: ICD-10-CM

## 2020-10-01 DIAGNOSIS — I49.5 SSS (SICK SINUS SYNDROME): ICD-10-CM

## 2020-10-01 DIAGNOSIS — Z95.5 S/P CORONARY ARTERY STENT PLACEMENT: ICD-10-CM

## 2020-10-01 DIAGNOSIS — I25.10 CORONARY ARTERY DISEASE INVOLVING NATIVE CORONARY ARTERY WITHOUT ANGINA PECTORIS, UNSPECIFIED WHETHER NATIVE OR TRANSPLANTED HEART: ICD-10-CM

## 2020-10-01 LAB
ASCENDING AORTA: 2.83 CM
AV INDEX (PROSTH): 0.88
AV MEAN GRADIENT: 2 MMHG
AV PEAK GRADIENT: 4 MMHG
AV REGURGITATION PRESSURE HALF TIME: 500 MS
AV VALVE AREA: 3.03 CM2
AV VELOCITY RATIO: 0.98
BSA FOR ECHO PROCEDURE: 1.85 M2
CV ECHO LV RWT: 0.35 CM
CV STRESS BASE HR: 70 BPM
DIASTOLIC BLOOD PRESSURE: 71 MMHG
DOP CALC AO PEAK VEL: 0.99 M/S
DOP CALC AO VTI: 24.77 CM
DOP CALC LVOT AREA: 3.5 CM2
DOP CALC LVOT DIAMETER: 2.1 CM
DOP CALC LVOT PEAK VEL: 0.97 M/S
DOP CALC LVOT STROKE VOLUME: 75.05 CM3
DOP CALCLVOT PEAK VEL VTI: 21.68 CM
E WAVE DECELERATION TIME: 179.46 MSEC
E/A RATIO: 0.68
E/E' RATIO: 9.09 M/S
ECHO LV POSTERIOR WALL: 0.81 CM (ref 0.6–1.1)
FRACTIONAL SHORTENING: 23 % (ref 28–44)
INTERVENTRICULAR SEPTUM: 0.83 CM (ref 0.6–1.1)
IVRT: 119.89 MSEC
LA MAJOR: 4.87 CM
LA MINOR: 4.8 CM
LA WIDTH: 3.25 CM
LEFT ATRIUM SIZE: 3.72 CM
LEFT ATRIUM VOLUME INDEX: 27 ML/M2
LEFT ATRIUM VOLUME: 49.68 CM3
LEFT INTERNAL DIMENSION IN SYSTOLE: 3.55 CM (ref 2.1–4)
LEFT VENTRICLE DIASTOLIC VOLUME INDEX: 53.87 ML/M2
LEFT VENTRICLE DIASTOLIC VOLUME: 99.2 ML
LEFT VENTRICLE MASS INDEX: 67 G/M2
LEFT VENTRICLE SYSTOLIC VOLUME INDEX: 28.6 ML/M2
LEFT VENTRICLE SYSTOLIC VOLUME: 52.58 ML
LEFT VENTRICULAR INTERNAL DIMENSION IN DIASTOLE: 4.64 CM (ref 3.5–6)
LEFT VENTRICULAR MASS: 123.55 G
LV LATERAL E/E' RATIO: 7.14 M/S
LV SEPTAL E/E' RATIO: 12.5 M/S
MV PEAK A VEL: 0.74 M/S
MV PEAK E VEL: 0.5 M/S
MV STENOSIS PRESSURE HALF TIME: 52.04 MS
MV VALVE AREA P 1/2 METHOD: 4.23 CM2
NUC REST EJECTION FRACTION: 65
OHS CV CPX 1 MINUTE RECOVERY HEART RATE: 66 BPM
OHS CV CPX 85 PERCENT MAX PREDICTED HEART RATE MALE: 122
OHS CV CPX MAX PREDICTED HEART RATE: 143
OHS CV CPX PATIENT IS FEMALE: 0
OHS CV CPX PATIENT IS MALE: 1
OHS CV CPX PEAK DIASTOLIC BLOOD PRESSURE: 79 MMHG
OHS CV CPX PEAK HEAR RATE: 81 BPM
OHS CV CPX PEAK RATE PRESSURE PRODUCT: NORMAL
OHS CV CPX PEAK SYSTOLIC BLOOD PRESSURE: 169 MMHG
OHS CV CPX PERCENT MAX PREDICTED HEART RATE ACHIEVED: 57
OHS CV CPX RATE PRESSURE PRODUCT PRESENTING: NORMAL
PISA TR MAX VEL: 2.38 M/S
PULM VEIN S/D RATIO: 1.07
PV PEAK D VEL: 0.44 M/S
PV PEAK S VEL: 0.47 M/S
RA MAJOR: 5.26 CM
RA PRESSURE: 8 MMHG
RA WIDTH: 3.74 CM
RIGHT VENTRICULAR END-DIASTOLIC DIMENSION: 3.63 CM
SINUS: 2.89 CM
STJ: 2.74 CM
SYSTOLIC BLOOD PRESSURE: 156 MMHG
TDI LATERAL: 0.07 M/S
TDI SEPTAL: 0.04 M/S
TDI: 0.06 M/S
TR MAX PG: 23 MMHG
TRICUSPID ANNULAR PLANE SYSTOLIC EXCURSION: 2.21 CM
TV REST PULMONARY ARTERY PRESSURE: 31 MMHG

## 2020-10-01 PROCEDURE — 93306 TTE W/DOPPLER COMPLETE: CPT | Mod: S$GLB,,, | Performed by: INTERNAL MEDICINE

## 2020-10-01 PROCEDURE — 93016 CV STRESS TEST SUPVJ ONLY: CPT | Mod: ,,, | Performed by: INTERNAL MEDICINE

## 2020-10-01 PROCEDURE — 93018 CV STRESS TEST I&R ONLY: CPT | Mod: ,,, | Performed by: INTERNAL MEDICINE

## 2020-10-01 PROCEDURE — 99999 PR PBB SHADOW E&M-EST. PATIENT-LVL II: CPT | Mod: PBBFAC,,,

## 2020-10-01 PROCEDURE — 93017 CV STRESS TEST TRACING ONLY: CPT

## 2020-10-01 PROCEDURE — 99999 PR PBB SHADOW E&M-EST. PATIENT-LVL II: ICD-10-PCS | Mod: PBBFAC,,,

## 2020-10-01 PROCEDURE — 93018 PR CARDIAC STRESS TST,INTERP/REPT ONLY: ICD-10-PCS | Mod: ,,, | Performed by: INTERNAL MEDICINE

## 2020-10-01 PROCEDURE — 93016 STRESS TEST WITH MYOCARDIAL PERFUSION (CUPID ONLY): ICD-10-PCS | Mod: ,,, | Performed by: INTERNAL MEDICINE

## 2020-10-01 PROCEDURE — 99999 PR PBB SHADOW E&M-EST. PATIENT-LVL I: CPT | Mod: PBBFAC,,,

## 2020-10-01 PROCEDURE — 93306 ECHO (CUPID ONLY): ICD-10-PCS | Mod: S$GLB,,, | Performed by: INTERNAL MEDICINE

## 2020-10-01 PROCEDURE — 99999 PR PBB SHADOW E&M-EST. PATIENT-LVL I: ICD-10-PCS | Mod: PBBFAC,,,

## 2020-10-01 PROCEDURE — 78452 STRESS TEST WITH MYOCARDIAL PERFUSION (CUPID ONLY): ICD-10-PCS | Mod: 26,,, | Performed by: INTERNAL MEDICINE

## 2020-10-01 PROCEDURE — 78452 HT MUSCLE IMAGE SPECT MULT: CPT | Mod: 26,,, | Performed by: INTERNAL MEDICINE

## 2020-10-05 ENCOUNTER — PATIENT MESSAGE (OUTPATIENT)
Dept: ADMINISTRATIVE | Facility: HOSPITAL | Age: 75
End: 2020-10-05

## 2020-10-07 ENCOUNTER — PATIENT OUTREACH (OUTPATIENT)
Dept: ADMINISTRATIVE | Facility: OTHER | Age: 75
End: 2020-10-07

## 2020-10-07 DIAGNOSIS — Z12.11 COLON CANCER SCREENING: Primary | ICD-10-CM

## 2020-10-07 NOTE — PROGRESS NOTES
LINKS immunization registry updated  Care Everywhere updated  Health Maintenance updated  Chart reviewed for overdue Proactive [facility] Encounters ([facility]) health maintenance testing (CRS, Breast Ca, Diabetic Eye Exam)   Orders entered:fit kit

## 2020-10-12 DIAGNOSIS — I25.10 CORONARY ARTERY DISEASE INVOLVING NATIVE CORONARY ARTERY WITHOUT ANGINA PECTORIS, UNSPECIFIED WHETHER NATIVE OR TRANSPLANTED HEART: Primary | ICD-10-CM

## 2020-10-12 RX ORDER — PRAVASTATIN SODIUM 40 MG/1
40 TABLET ORAL DAILY
Qty: 90 TABLET | Refills: 3 | Status: SHIPPED | OUTPATIENT
Start: 2020-10-12 | End: 2020-11-30 | Stop reason: SDUPTHER

## 2020-10-26 ENCOUNTER — CLINICAL SUPPORT (OUTPATIENT)
Dept: CARDIOLOGY | Facility: CLINIC | Age: 75
End: 2020-10-26
Payer: MEDICARE

## 2020-10-26 ENCOUNTER — CLINICAL SUPPORT (OUTPATIENT)
Dept: CARDIOLOGY | Facility: CLINIC | Age: 75
End: 2020-10-26

## 2020-10-26 DIAGNOSIS — Z95.0 PRESENCE OF CARDIAC PACEMAKER: ICD-10-CM

## 2020-10-26 PROCEDURE — 93296 REM INTERROG EVL PM/IDS: CPT | Mod: S$GLB,,, | Performed by: INTERNAL MEDICINE

## 2020-10-26 PROCEDURE — 93294 CARDIAC DEVICE CHECK - REMOTE: ICD-10-PCS | Mod: S$GLB,,, | Performed by: INTERNAL MEDICINE

## 2020-10-26 PROCEDURE — 93296 CARDIAC DEVICE CHECK - REMOTE: ICD-10-PCS | Mod: S$GLB,,, | Performed by: INTERNAL MEDICINE

## 2020-10-26 PROCEDURE — 93294 REM INTERROG EVL PM/LDLS PM: CPT | Mod: S$GLB,,, | Performed by: INTERNAL MEDICINE

## 2020-10-30 ENCOUNTER — OFFICE VISIT (OUTPATIENT)
Dept: CARDIOLOGY | Facility: CLINIC | Age: 75
End: 2020-10-30
Payer: MEDICARE

## 2020-10-30 VITALS
HEART RATE: 63 BPM | WEIGHT: 168.44 LBS | DIASTOLIC BLOOD PRESSURE: 75 MMHG | BODY MASS INDEX: 25.61 KG/M2 | SYSTOLIC BLOOD PRESSURE: 157 MMHG

## 2020-10-30 DIAGNOSIS — Z95.0 PACEMAKER: ICD-10-CM

## 2020-10-30 DIAGNOSIS — I25.10 CORONARY ARTERY DISEASE INVOLVING NATIVE CORONARY ARTERY WITHOUT ANGINA PECTORIS, UNSPECIFIED WHETHER NATIVE OR TRANSPLANTED HEART: Primary | ICD-10-CM

## 2020-10-30 DIAGNOSIS — I49.5 SSS (SICK SINUS SYNDROME): ICD-10-CM

## 2020-10-30 DIAGNOSIS — I10 ESSENTIAL HYPERTENSION: ICD-10-CM

## 2020-10-30 DIAGNOSIS — Z95.5 S/P CORONARY ARTERY STENT PLACEMENT: ICD-10-CM

## 2020-10-30 PROCEDURE — 1159F PR MEDICATION LIST DOCUMENTED IN MEDICAL RECORD: ICD-10-PCS | Mod: S$GLB,,, | Performed by: INTERNAL MEDICINE

## 2020-10-30 PROCEDURE — 99214 OFFICE O/P EST MOD 30 MIN: CPT | Mod: S$GLB,,, | Performed by: INTERNAL MEDICINE

## 2020-10-30 PROCEDURE — 1126F PR PAIN SEVERITY QUANTIFIED, NO PAIN PRESENT: ICD-10-PCS | Mod: S$GLB,,, | Performed by: INTERNAL MEDICINE

## 2020-10-30 PROCEDURE — 99999 PR PBB SHADOW E&M-EST. PATIENT-LVL II: CPT | Mod: PBBFAC,,, | Performed by: INTERNAL MEDICINE

## 2020-10-30 PROCEDURE — 1159F MED LIST DOCD IN RCRD: CPT | Mod: S$GLB,,, | Performed by: INTERNAL MEDICINE

## 2020-10-30 PROCEDURE — 99214 PR OFFICE/OUTPT VISIT, EST, LEVL IV, 30-39 MIN: ICD-10-PCS | Mod: S$GLB,,, | Performed by: INTERNAL MEDICINE

## 2020-10-30 PROCEDURE — 1101F PT FALLS ASSESS-DOCD LE1/YR: CPT | Mod: CPTII,S$GLB,, | Performed by: INTERNAL MEDICINE

## 2020-10-30 PROCEDURE — 99999 PR PBB SHADOW E&M-EST. PATIENT-LVL II: ICD-10-PCS | Mod: PBBFAC,,, | Performed by: INTERNAL MEDICINE

## 2020-10-30 PROCEDURE — 3078F PR MOST RECENT DIASTOLIC BLOOD PRESSURE < 80 MM HG: ICD-10-PCS | Mod: CPTII,S$GLB,, | Performed by: INTERNAL MEDICINE

## 2020-10-30 PROCEDURE — 3078F DIAST BP <80 MM HG: CPT | Mod: CPTII,S$GLB,, | Performed by: INTERNAL MEDICINE

## 2020-10-30 PROCEDURE — 3077F PR MOST RECENT SYSTOLIC BLOOD PRESSURE >= 140 MM HG: ICD-10-PCS | Mod: CPTII,S$GLB,, | Performed by: INTERNAL MEDICINE

## 2020-10-30 PROCEDURE — 3077F SYST BP >= 140 MM HG: CPT | Mod: CPTII,S$GLB,, | Performed by: INTERNAL MEDICINE

## 2020-10-30 PROCEDURE — 99499 UNLISTED E&M SERVICE: CPT | Mod: S$GLB,,, | Performed by: INTERNAL MEDICINE

## 2020-10-30 PROCEDURE — 99499 RISK ADDL DX/OHS AUDIT: ICD-10-PCS | Mod: S$GLB,,, | Performed by: INTERNAL MEDICINE

## 2020-10-30 PROCEDURE — 1126F AMNT PAIN NOTED NONE PRSNT: CPT | Mod: S$GLB,,, | Performed by: INTERNAL MEDICINE

## 2020-10-30 PROCEDURE — 1101F PR PT FALLS ASSESS DOC 0-1 FALLS W/OUT INJ PAST YR: ICD-10-PCS | Mod: CPTII,S$GLB,, | Performed by: INTERNAL MEDICINE

## 2020-10-30 NOTE — PROGRESS NOTES
Subjective:    Patient ID:  José Miguel Austin is a 75 y.o. male who presents for follow-up of cad    HPI  He comes for follow up with no major problems, no chest pain, no shortness of breath.  FC II    Review of Systems   Constitution: Negative for decreased appetite, malaise/fatigue, weight gain and weight loss.   Cardiovascular: Negative for chest pain, dyspnea on exertion, leg swelling, palpitations and syncope.   Respiratory: Negative for cough and shortness of breath.    Gastrointestinal: Negative.    Neurological: Negative for weakness.   All other systems reviewed and are negative.       Objective:      Physical Exam   Constitutional: He is oriented to person, place, and time. He appears well-developed and well-nourished.   HENT:   Head: Normocephalic.   Eyes: Pupils are equal, round, and reactive to light.   Neck: Normal range of motion. Neck supple. No JVD present. Carotid bruit is not present. No thyromegaly present.   Cardiovascular: Normal rate, regular rhythm, normal heart sounds, intact distal pulses and normal pulses. PMI is not displaced. Exam reveals no gallop.   No murmur heard.  Pulmonary/Chest: Effort normal and breath sounds normal.   Abdominal: Soft. Normal appearance. He exhibits no mass. There is no hepatosplenomegaly. There is no abdominal tenderness.   Musculoskeletal: Normal range of motion.         General: No edema.   Neurological: He is alert and oriented to person, place, and time. He has normal strength and normal reflexes. No sensory deficit.   Skin: Skin is warm and intact.   Psychiatric: He has a normal mood and affect.   Nursing note and vitals reviewed.    Echogui reviewed      Assessment:       1. Coronary artery disease involving native coronary artery without angina pectoris, unspecified whether native or transplanted heart    2. SSS (sick sinus syndrome)    3. Essential hypertension    4. S/P coronary artery stent placement    5. Pacemaker         Plan:     Continue all  cardiac medications  Regular exercise program  Weight loss  1 yr f/u with [facility]

## 2020-11-19 ENCOUNTER — PATIENT OUTREACH (OUTPATIENT)
Dept: ADMINISTRATIVE | Facility: HOSPITAL | Age: 75
End: 2020-11-19

## 2020-12-03 ENCOUNTER — PATIENT MESSAGE (OUTPATIENT)
Dept: ADMINISTRATIVE | Facility: HOSPITAL | Age: 75
End: 2020-12-03

## 2020-12-10 NOTE — TELEPHONE ENCOUNTER
"Dr. Dickey,    Please review portal message.  Patient is requesting lab orders placed before his office visit.    "I asked again please have ORDERS FOR LAB WORK sent to [facility]. Lab before my office visit--"    Future Appointments   Date Time Provider Department Center   12/28/2020  2:40 PM Matias Dickey MD [facility] MED [place]   1/24/2021 10:00 AM HOME MONITOR DEVICE CHECK, [facility] CARDIO [place]   10/26/2021  9:15 AM ECHO, [facility]   11/5/2021  9:00 AM Taco Rothman MD [facility]       "

## 2020-12-14 ENCOUNTER — PATIENT OUTREACH (OUTPATIENT)
Dept: ADMINISTRATIVE | Facility: HOSPITAL | Age: 75
End: 2020-12-14

## 2020-12-14 NOTE — PROGRESS NOTES
Care Everywhere updates requested and reviewed.  Immunizations reconciled. Media reports reviewed.  Duplicate HM overrides and  orders removed.  Overdue HM topic chart audit and/or requested.  Overdue lab testing linked to upcoming lab appointments if applies.  Recently reviewed/outreached

## 2020-12-28 ENCOUNTER — OFFICE VISIT (OUTPATIENT)
Dept: FAMILY MEDICINE | Facility: CLINIC | Age: 75
End: 2020-12-28
Payer: MEDICARE

## 2020-12-28 VITALS
SYSTOLIC BLOOD PRESSURE: 130 MMHG | OXYGEN SATURATION: 98 % | TEMPERATURE: 98 F | WEIGHT: 169.31 LBS | HEIGHT: 68 IN | HEART RATE: 62 BPM | DIASTOLIC BLOOD PRESSURE: 82 MMHG | BODY MASS INDEX: 25.66 KG/M2

## 2020-12-28 DIAGNOSIS — N39.43 DRIBBLING OF URINE: ICD-10-CM

## 2020-12-28 DIAGNOSIS — Z00.00 PREVENTATIVE HEALTH CARE: Primary | ICD-10-CM

## 2020-12-28 PROCEDURE — 3075F PR MOST RECENT SYSTOLIC BLOOD PRESS GE 130-139MM HG: ICD-10-PCS | Mod: CPTII,S$GLB,, | Performed by: FAMILY MEDICINE

## 2020-12-28 PROCEDURE — 99213 OFFICE O/P EST LOW 20 MIN: CPT | Mod: 25,S$GLB,, | Performed by: FAMILY MEDICINE

## 2020-12-28 PROCEDURE — 1126F AMNT PAIN NOTED NONE PRSNT: CPT | Mod: S$GLB,,, | Performed by: FAMILY MEDICINE

## 2020-12-28 PROCEDURE — 3075F SYST BP GE 130 - 139MM HG: CPT | Mod: CPTII,S$GLB,, | Performed by: FAMILY MEDICINE

## 2020-12-28 PROCEDURE — 3079F DIAST BP 80-89 MM HG: CPT | Mod: CPTII,S$GLB,, | Performed by: FAMILY MEDICINE

## 2020-12-28 PROCEDURE — 90694 VACC AIIV4 NO PRSRV 0.5ML IM: CPT | Mod: S$GLB,,, | Performed by: FAMILY MEDICINE

## 2020-12-28 PROCEDURE — 3288F FALL RISK ASSESSMENT DOCD: CPT | Mod: CPTII,S$GLB,, | Performed by: FAMILY MEDICINE

## 2020-12-28 PROCEDURE — 1101F PT FALLS ASSESS-DOCD LE1/YR: CPT | Mod: CPTII,S$GLB,, | Performed by: FAMILY MEDICINE

## 2020-12-28 PROCEDURE — 1126F PR PAIN SEVERITY QUANTIFIED, NO PAIN PRESENT: ICD-10-PCS | Mod: S$GLB,,, | Performed by: FAMILY MEDICINE

## 2020-12-28 PROCEDURE — 99999 PR PBB SHADOW E&M-EST. PATIENT-LVL IV: ICD-10-PCS | Mod: PBBFAC,,, | Performed by: FAMILY MEDICINE

## 2020-12-28 PROCEDURE — G0008 FLU VACCINE - QUADRIVALENT - ADJUVANTED: ICD-10-PCS | Mod: S$GLB,,, | Performed by: FAMILY MEDICINE

## 2020-12-28 PROCEDURE — 3079F PR MOST RECENT DIASTOLIC BLOOD PRESSURE 80-89 MM HG: ICD-10-PCS | Mod: CPTII,S$GLB,, | Performed by: FAMILY MEDICINE

## 2020-12-28 PROCEDURE — 1101F PR PT FALLS ASSESS DOC 0-1 FALLS W/OUT INJ PAST YR: ICD-10-PCS | Mod: CPTII,S$GLB,, | Performed by: FAMILY MEDICINE

## 2020-12-28 PROCEDURE — 99213 PR OFFICE/OUTPT VISIT, EST, LEVL III, 20-29 MIN: ICD-10-PCS | Mod: 25,S$GLB,, | Performed by: FAMILY MEDICINE

## 2020-12-28 PROCEDURE — 99999 PR PBB SHADOW E&M-EST. PATIENT-LVL IV: CPT | Mod: PBBFAC,,, | Performed by: FAMILY MEDICINE

## 2020-12-28 PROCEDURE — 90694 FLU VACCINE - QUADRIVALENT - ADJUVANTED: ICD-10-PCS | Mod: S$GLB,,, | Performed by: FAMILY MEDICINE

## 2020-12-28 PROCEDURE — G0008 ADMIN INFLUENZA VIRUS VAC: HCPCS | Mod: S$GLB,,, | Performed by: FAMILY MEDICINE

## 2020-12-28 PROCEDURE — 3288F PR FALLS RISK ASSESSMENT DOCUMENTED: ICD-10-PCS | Mod: CPTII,S$GLB,, | Performed by: FAMILY MEDICINE

## 2020-12-28 NOTE — PROGRESS NOTES
75 year old male patient presents for annual exam    Getting testosterone injections twice monthly and following with Dr. Barrientos.   CAD s/p stents - taking aspirin 81mg weekly; following with Dr. Rothman, tolerating Pravachol 40mg daily  Hypogonadism - following with Dr. Barrientos  He exercises regularly.    Some urinary dribbling prior to urgency.  This has been chronic and present for years.      Past Medical History:   Diagnosis Date    Basal cell carcinoma     CAD (coronary artery disease)     Hypertension        Past Surgical History:   Procedure Laterality Date    APPENDECTOMY      BASAL CELL CARCINOMA EXCISION      left forehead    CARDIAC PACEMAKER PLACEMENT      CIRCUMCISION      CORONARY STENT PLACEMENT      4 stents in RCA    inguinal hernia      LUMBAR DISC SURGERY  1998       Allergies   Allergen Reactions    Hydrocodone Nausea Only       Social History     Socioeconomic History    Marital status:      Spouse name: Not on file    Number of children: 3    Years of education: Not on file    Highest education level: Not on file   Occupational History    Occupation: retired refinery     Occupation: 8,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     Comment: resting heart rate 50    [place]    Occupation: 3 times a week gym   Social Needs    Financial resource strain: Not very hard    Food insecurity     Worry: Never true     Inability: Never true    Transportation needs     Medical: No     Non-medical: No   Tobacco Use    Smoking status: Never Smoker    Smokeless tobacco: Never Used   Substance and Sexual Activity    Alcohol use: No     Frequency: Never     Drinks per session: Patient refused     Binge frequency: Never    Drug use: No    Sexual activity: Not on file   Lifestyle    Physical activity     Days per week: 7 days     Minutes per session: 60 min    Stress: Very much   Relationships    Social connections     Talks on  phone: Three times a week     Gets together: Three times a week     Attends Taoism service: Not on file     Active member of club or organization: No     Attends meetings of clubs or organizations: Never     Relationship status:    Other Topics Concern    Not on file   Social History Narrative    Exercise: gym 2-3 times per week        Cycles 5 days a week       Current Outpatient Medications on File Prior to Visit   Medication Sig Dispense Refill    alprostadiL (EDEX) 20 mcg injection 20 mcg by Intracavitary route as needed for Erectile Dysfunction. use no more than 3 times per week 5 vial 12    coenzyme Q10 (CO Q-10) 100 mg capsule Take 100 mg by mouth once daily.      fish oil-omega-3 fatty acids 300-1,000 mg capsule Take by mouth once daily.      multivitamin capsule Take 1 capsule by mouth once daily.      prasterone, dhea, (DHEA) 50 mg Tab Take by mouth once daily.      pravastatin (PRAVACHOL) 40 MG tablet TAKE 1 TABLET BY MOUTH EVERY DAY 90 tablet 3    testosterone cypionate (DEPOTESTOTERONE CYPIONATE) 200 mg/mL injection Inject 200 mg into the muscle every 30 days.       [DISCONTINUED] tiZANidine (ZANAFLEX) 2 MG tablet TAKE 1 TABLET(2 MG) BY MOUTH EVERY NIGHT AS NEEDED 40 tablet 1    [DISCONTINUED] traZODone (DESYREL) 50 MG tablet TAKE 1 TABLET(50 MG) BY MOUTH EVERY EVENING 30 tablet 4    aspirin (ECOTRIN) 81 MG EC tablet Take 1 tablet (81 mg total) by mouth once daily. (Patient taking differently: Take 81 mg by mouth. Weekly  )  0    [DISCONTINUED] busPIRone (BUSPAR) 7.5 MG tablet Take 1 tablet (7.5 mg total) by mouth 3 (three) times daily. 90 tablet 1     No current facility-administered medications on file prior to visit.        Family History   Problem Relation Age of Onset    Heart disease Mother     Heart disease Father     Heart disease Sister     Heart disease Brother        ROS:  GENERAL: No fever, chills, or weight loss. C/o persistent fatigue and feeling down.  SKIN: No  "rashes, itching or changes in color or texture of skin.  HEAD: No headaches or recent head trauma.  EYES: Visual acuity fine. No photophobia, ocular pain or diplopia.  EARS: Denies ear pain, discharge or vertigo.  NOSE: No loss of smell, no epistaxis or postnasal drip.  MOUTH & THROAT: No hoarseness or change in voice. No excessive gum bleeding.  NODES: Denies swollen glands.  CHEST: Denies SANON, cyanosis, wheezing, cough and sputum production.  CARDIOVASCULAR: Denies chest pain, PND, orthopnea or reduced exercise tolerance. C/o dizziness.  ABDOMEN: Appetite fine. No weight loss. Denies diarrhea, abdominal pain, hematemesis or blood in stool.  URINARY: No flank pain, dysuria or hematuria.  PERIPHERAL VASCULAR: No claudication or cyanosis.  MUSCULOSKELETAL: No joint stiffness or swelling. Denies back pain.  NEUROLOGIC: No history of seizures, paralysis, alteration of gait or coordination.    PE:   /82 (BP Location: Left arm, Patient Position: Sitting)   Pulse 62   Temp 98.3 °F (36.8 °C) (Oral)   Ht 5' 8" (1.727 m)   Wt 76.8 kg (169 lb 5 oz)   SpO2 98%   BMI 25.74 kg/m²     APPEARANCE: Well nourished, well developed, in no acute distress.   SKIN: Warm, dry. Large ecchymotic area dorsal surface right hand.   HEENT: Normocephalic, atraumatic. No pharyngeal erythema or exudate. No stridor.   NECK: Supple. NO adenopathy or thyromegaly.   CHEST: Lungs clear to auscultation. No adventitious sounds.   CARDIOVASCULAR: Normal S1, S2. No rubs, murmurs or gallops. Brisk carotid pulses without bruits.   ABDOMEN: Bowel sounds normal. Not distended. Soft. No tenderness or masses.  PERIPHERAL VASCULAR: Femoral pulses present and symmetrical. No edema.  MUSCULOSKELETAL: Unremarkable. There is no joint erythema or warmth noted and there is normal range of motion of all major joints.  BACK: No CVA tenderness. There is no spasm, tenderness or radiculopathy noted with palpation and there is full range of motion.   NEUROLOGIC: " Cranial Nerves: II-XII grossly intact.  Motor: 5/5 strength major flexors/extensors. No tremor.  Gait & Posture: Normal gait and fine motion. No cerebellar signs.  Psych: mood euthymic; insight and judgement normal      Preventative health care    Dribbling of urine  -     Ambulatory referral/consult to Urology; Future; Expected date: 01/04/2021    Other orders  -     Influenza - Quadrivalent (Adjuvanted)        Continue present meds  Counselled on regular exercise, maintenance of a healthy weight, balanced diet rich in fruits/vegetables, avoidance of unhealthy habits.  Continue present testosterone replacement and endocrinology follow-up  F/u for routine physical as planned

## 2021-01-12 ENCOUNTER — IMMUNIZATION (OUTPATIENT)
Dept: INTERNAL MEDICINE | Facility: CLINIC | Age: 76
End: 2021-01-12
Payer: MEDICARE

## 2021-01-12 DIAGNOSIS — Z23 NEED FOR VACCINATION: ICD-10-CM

## 2021-01-12 PROCEDURE — 91300 COVID-19, MRNA, LNP-S, PF, 30 MCG/0.3 ML DOSE VACCINE: CPT | Mod: PBBFAC | Performed by: FAMILY MEDICINE

## 2021-01-24 ENCOUNTER — CLINICAL SUPPORT (OUTPATIENT)
Dept: CARDIOLOGY | Facility: CLINIC | Age: 76
End: 2021-01-24
Payer: MEDICARE

## 2021-01-24 DIAGNOSIS — Z95.0 PRESENCE OF CARDIAC PACEMAKER: ICD-10-CM

## 2021-01-24 PROCEDURE — 93296 CARDIAC DEVICE CHECK - REMOTE: ICD-10-PCS | Mod: S$GLB,,, | Performed by: INTERNAL MEDICINE

## 2021-01-24 PROCEDURE — 93294 CARDIAC DEVICE CHECK - REMOTE: ICD-10-PCS | Mod: ,,, | Performed by: INTERNAL MEDICINE

## 2021-01-24 PROCEDURE — 93294 REM INTERROG EVL PM/LDLS PM: CPT | Mod: ,,, | Performed by: INTERNAL MEDICINE

## 2021-01-24 PROCEDURE — 93296 REM INTERROG EVL PM/IDS: CPT | Mod: PBBFAC,PO | Performed by: INTERNAL MEDICINE

## 2021-02-02 ENCOUNTER — IMMUNIZATION (OUTPATIENT)
Dept: INTERNAL MEDICINE | Facility: CLINIC | Age: 76
End: 2021-02-02
Payer: MEDICARE

## 2021-02-02 DIAGNOSIS — Z23 NEED FOR VACCINATION: Primary | ICD-10-CM

## 2021-02-02 PROCEDURE — 91300 COVID-19, MRNA, LNP-S, PF, 30 MCG/0.3 ML DOSE VACCINE: CPT | Mod: PBBFAC | Performed by: FAMILY MEDICINE

## 2021-02-02 PROCEDURE — 0002A COVID-19, MRNA, LNP-S, PF, 30 MCG/0.3 ML DOSE VACCINE: CPT | Mod: PBBFAC | Performed by: FAMILY MEDICINE

## 2021-03-30 ENCOUNTER — LAB VISIT (OUTPATIENT)
Dept: LAB | Facility: HOSPITAL | Age: 76
End: 2021-03-30
Attending: INTERNAL MEDICINE
Payer: MEDICARE

## 2021-03-30 DIAGNOSIS — Z12.5 SCREENING PSA (PROSTATE SPECIFIC ANTIGEN): ICD-10-CM

## 2021-03-30 DIAGNOSIS — R53.81 DEBILITY: ICD-10-CM

## 2021-03-30 DIAGNOSIS — E53.8 BIOTIN-(PROPIONYL-COA-CARBOXYLASE) LIGASE DEFICIENCY: Primary | ICD-10-CM

## 2021-03-30 DIAGNOSIS — E29.1 3-OXO-5 ALPHA-STEROID DELTA 4-DEHYDROGENASE DEFICIENCY: ICD-10-CM

## 2021-03-30 DIAGNOSIS — E55.9 AVITAMINOSIS D: ICD-10-CM

## 2021-03-30 DIAGNOSIS — I25.10 CAD (CORONARY ARTERY DISEASE): ICD-10-CM

## 2021-03-30 LAB
25(OH)D3+25(OH)D2 SERPL-MCNC: 92 NG/ML (ref 30–96)
ALBUMIN SERPL BCP-MCNC: 3.5 G/DL (ref 3.5–5.2)
ALP SERPL-CCNC: 67 U/L (ref 55–135)
ALT SERPL W/O P-5'-P-CCNC: 45 U/L (ref 10–44)
ANION GAP SERPL CALC-SCNC: 8 MMOL/L (ref 8–16)
AST SERPL-CCNC: 41 U/L (ref 10–40)
BILIRUB SERPL-MCNC: 0.6 MG/DL (ref 0.1–1)
BUN SERPL-MCNC: 16 MG/DL (ref 8–23)
CALCIUM SERPL-MCNC: 8.9 MG/DL (ref 8.7–10.5)
CHLORIDE SERPL-SCNC: 105 MMOL/L (ref 95–110)
CHOLEST SERPL-MCNC: 134 MG/DL (ref 120–199)
CHOLEST/HDLC SERPL: 3.2 {RATIO} (ref 2–5)
CK SERPL-CCNC: 381 U/L (ref 20–200)
CO2 SERPL-SCNC: 27 MMOL/L (ref 23–29)
CREAT SERPL-MCNC: 1 MG/DL (ref 0.5–1.4)
EST. GFR  (AFRICAN AMERICAN): >60 ML/MIN/1.73 M^2
EST. GFR  (NON AFRICAN AMERICAN): >60 ML/MIN/1.73 M^2
GLUCOSE SERPL-MCNC: 78 MG/DL (ref 70–110)
HDLC SERPL-MCNC: 42 MG/DL (ref 40–75)
HDLC SERPL: 31.3 % (ref 20–50)
LDLC SERPL CALC-MCNC: 81.6 MG/DL (ref 63–159)
LH SERPL-ACNC: 0.6 MIU/ML (ref 0.6–12.1)
NONHDLC SERPL-MCNC: 92 MG/DL
POTASSIUM SERPL-SCNC: 4.2 MMOL/L (ref 3.5–5.1)
PROT SERPL-MCNC: 6.9 G/DL (ref 6–8.4)
SODIUM SERPL-SCNC: 140 MMOL/L (ref 136–145)
T3FREE SERPL-MCNC: 2.3 PG/ML (ref 2.3–4.2)
T4 FREE SERPL-MCNC: 1.01 NG/DL (ref 0.71–1.51)
TESTOST SERPL-MCNC: >1500 NG/DL (ref 304–1227)
TRIGL SERPL-MCNC: 52 MG/DL (ref 30–150)
TSH SERPL DL<=0.005 MIU/L-ACNC: 2.67 UIU/ML (ref 0.4–4)

## 2021-03-30 PROCEDURE — 84443 ASSAY THYROID STIM HORMONE: CPT | Performed by: INTERNAL MEDICINE

## 2021-03-30 PROCEDURE — 83002 ASSAY OF GONADOTROPIN (LH): CPT | Performed by: INTERNAL MEDICINE

## 2021-03-30 PROCEDURE — 84439 ASSAY OF FREE THYROXINE: CPT | Performed by: INTERNAL MEDICINE

## 2021-03-30 PROCEDURE — 84481 FREE ASSAY (FT-3): CPT | Performed by: INTERNAL MEDICINE

## 2021-03-30 PROCEDURE — 82306 VITAMIN D 25 HYDROXY: CPT | Performed by: INTERNAL MEDICINE

## 2021-03-30 PROCEDURE — 36415 COLL VENOUS BLD VENIPUNCTURE: CPT | Mod: PO | Performed by: INTERNAL MEDICINE

## 2021-03-30 PROCEDURE — 83036 HEMOGLOBIN GLYCOSYLATED A1C: CPT | Mod: GA | Performed by: INTERNAL MEDICINE

## 2021-03-30 PROCEDURE — 80061 LIPID PANEL: CPT | Performed by: INTERNAL MEDICINE

## 2021-03-30 PROCEDURE — 82550 ASSAY OF CK (CPK): CPT | Performed by: INTERNAL MEDICINE

## 2021-03-30 PROCEDURE — 84402 ASSAY OF FREE TESTOSTERONE: CPT | Performed by: INTERNAL MEDICINE

## 2021-03-30 PROCEDURE — 80053 COMPREHEN METABOLIC PANEL: CPT | Performed by: INTERNAL MEDICINE

## 2021-03-30 PROCEDURE — 84403 ASSAY OF TOTAL TESTOSTERONE: CPT | Performed by: INTERNAL MEDICINE

## 2021-03-31 LAB
ESTIMATED AVG GLUCOSE: 108 MG/DL (ref 68–131)
HBA1C MFR BLD: 5.4 % (ref 4–5.6)

## 2021-04-02 LAB — TESTOST FREE SERPL-MCNC: 81 PG/ML

## 2021-04-24 ENCOUNTER — CLINICAL SUPPORT (OUTPATIENT)
Dept: CARDIOLOGY | Facility: CLINIC | Age: 76
End: 2021-04-24
Payer: MEDICARE

## 2021-04-24 DIAGNOSIS — Z95.0 PRESENCE OF CARDIAC PACEMAKER: ICD-10-CM

## 2021-04-24 PROCEDURE — 93296 CARDIAC DEVICE CHECK - REMOTE: ICD-10-PCS | Mod: S$GLB,,, | Performed by: INTERNAL MEDICINE

## 2021-04-24 PROCEDURE — 93294 CARDIAC DEVICE CHECK - REMOTE: ICD-10-PCS | Mod: S$GLB,,, | Performed by: INTERNAL MEDICINE

## 2021-04-24 PROCEDURE — 93294 REM INTERROG EVL PM/LDLS PM: CPT | Mod: S$GLB,,, | Performed by: INTERNAL MEDICINE

## 2021-04-24 PROCEDURE — 93296 REM INTERROG EVL PM/IDS: CPT | Mod: S$GLB,,, | Performed by: INTERNAL MEDICINE

## 2021-06-01 ENCOUNTER — TELEPHONE (OUTPATIENT)
Dept: CARDIOLOGY | Facility: HOSPITAL | Age: 76
End: 2021-06-01

## 2021-07-23 ENCOUNTER — CLINICAL SUPPORT (OUTPATIENT)
Dept: CARDIOLOGY | Facility: HOSPITAL | Age: 76
End: 2021-07-23
Payer: MEDICARE

## 2021-07-23 ENCOUNTER — HOSPITAL ENCOUNTER (OUTPATIENT)
Dept: CARDIOLOGY | Facility: HOSPITAL | Age: 76
Discharge: HOME | End: 2021-07-23
Payer: MEDICARE

## 2021-07-23 DIAGNOSIS — Z95.0 PRESENCE OF CARDIAC PACEMAKER: ICD-10-CM

## 2021-07-23 PROCEDURE — 93294 CARDIAC DEVICE CHECK - REMOTE: ICD-10-PCS | Mod: ,,, | Performed by: INTERNAL MEDICINE

## 2021-07-23 PROCEDURE — 93294 REM INTERROG EVL PM/LDLS PM: CPT | Mod: ,,, | Performed by: INTERNAL MEDICINE

## 2021-07-23 PROCEDURE — 93296 REM INTERROG EVL PM/IDS: CPT | Mod: PO | Performed by: INTERNAL MEDICINE

## 2021-07-29 ENCOUNTER — TELEPHONE (OUTPATIENT)
Dept: CARDIOLOGY | Facility: HOSPITAL | Age: 76
End: 2021-07-29

## 2021-09-20 ENCOUNTER — TELEPHONE (OUTPATIENT)
Dept: FAMILY MEDICINE | Facility: CLINIC | Age: 76
End: 2021-09-20

## 2021-09-20 DIAGNOSIS — N39.43 DRIBBLING OF URINE: Primary | ICD-10-CM

## 2021-09-22 ENCOUNTER — TELEPHONE (OUTPATIENT)
Dept: FAMILY MEDICINE | Facility: CLINIC | Age: 76
End: 2021-09-22

## 2021-09-22 ENCOUNTER — TELEPHONE (OUTPATIENT)
Dept: CARDIOLOGY | Facility: HOSPITAL | Age: 76
End: 2021-09-22

## 2021-09-22 DIAGNOSIS — Z95.0 PACEMAKER: Primary | ICD-10-CM

## 2021-09-22 DIAGNOSIS — I49.5 SSS (SICK SINUS SYNDROME): ICD-10-CM

## 2021-09-27 ENCOUNTER — PATIENT MESSAGE (OUTPATIENT)
Dept: HOME HEALTH SERVICES | Facility: CLINIC | Age: 76
End: 2021-09-27

## 2021-09-27 DIAGNOSIS — H61.23 BILATERAL IMPACTED CERUMEN: Primary | ICD-10-CM

## 2021-09-30 ENCOUNTER — LAB VISIT (OUTPATIENT)
Dept: LAB | Facility: HOSPITAL | Age: 76
End: 2021-09-30
Attending: INTERNAL MEDICINE
Payer: MEDICARE

## 2021-09-30 DIAGNOSIS — E55.9 VITAMIN D DEFICIENCY: ICD-10-CM

## 2021-09-30 DIAGNOSIS — Z12.5 SPECIAL SCREENING FOR MALIGNANT NEOPLASM OF PROSTATE: ICD-10-CM

## 2021-09-30 DIAGNOSIS — E53.8 BIOTIN-(PROPIONYL-COA-CARBOXYLASE) LIGASE DEFICIENCY: Primary | ICD-10-CM

## 2021-09-30 DIAGNOSIS — R53.81 DEBILITY: ICD-10-CM

## 2021-09-30 DIAGNOSIS — E29.1 ANDROGEN DEFICIENCY: ICD-10-CM

## 2021-09-30 LAB
25(OH)D3+25(OH)D2 SERPL-MCNC: 102 NG/ML (ref 30–96)
ALBUMIN SERPL BCP-MCNC: 3.5 G/DL (ref 3.5–5.2)
ALP SERPL-CCNC: 66 U/L (ref 55–135)
ALT SERPL W/O P-5'-P-CCNC: 31 U/L (ref 10–44)
ANION GAP SERPL CALC-SCNC: 11 MMOL/L (ref 8–16)
AST SERPL-CCNC: 36 U/L (ref 10–40)
BASOPHILS # BLD AUTO: 0.06 K/UL (ref 0–0.2)
BASOPHILS NFR BLD: 0.9 % (ref 0–1.9)
BILIRUB SERPL-MCNC: 0.8 MG/DL (ref 0.1–1)
BUN SERPL-MCNC: 15 MG/DL (ref 8–23)
CALCIUM SERPL-MCNC: 9.3 MG/DL (ref 8.7–10.5)
CHLORIDE SERPL-SCNC: 105 MMOL/L (ref 95–110)
CHOLEST SERPL-MCNC: 126 MG/DL (ref 120–199)
CHOLEST/HDLC SERPL: 2.7 {RATIO} (ref 2–5)
CK SERPL-CCNC: 341 U/L (ref 20–200)
CO2 SERPL-SCNC: 22 MMOL/L (ref 23–29)
COMPLEXED PSA SERPL-MCNC: 0.93 NG/ML (ref 0–4)
CREAT SERPL-MCNC: 1.3 MG/DL (ref 0.5–1.4)
DIFFERENTIAL METHOD: ABNORMAL
EOSINOPHIL # BLD AUTO: 0.2 K/UL (ref 0–0.5)
EOSINOPHIL NFR BLD: 3.3 % (ref 0–8)
ERYTHROCYTE [DISTWIDTH] IN BLOOD BY AUTOMATED COUNT: 13.6 % (ref 11.5–14.5)
EST. GFR  (AFRICAN AMERICAN): >60 ML/MIN/1.73 M^2
EST. GFR  (NON AFRICAN AMERICAN): 52.3 ML/MIN/1.73 M^2
GLUCOSE SERPL-MCNC: 82 MG/DL (ref 70–110)
HCT VFR BLD AUTO: 46.2 % (ref 40–54)
HDLC SERPL-MCNC: 46 MG/DL (ref 40–75)
HDLC SERPL: 36.5 % (ref 20–50)
HGB BLD-MCNC: 15.5 G/DL (ref 14–18)
IMM GRANULOCYTES # BLD AUTO: 0.01 K/UL (ref 0–0.04)
IMM GRANULOCYTES NFR BLD AUTO: 0.1 % (ref 0–0.5)
LDLC SERPL CALC-MCNC: 68.2 MG/DL (ref 63–159)
LH SERPL-ACNC: 0.2 MIU/ML (ref 0.6–12.1)
LYMPHOCYTES # BLD AUTO: 2 K/UL (ref 1–4.8)
LYMPHOCYTES NFR BLD: 28.8 % (ref 18–48)
MCH RBC QN AUTO: 32.8 PG (ref 27–31)
MCHC RBC AUTO-ENTMCNC: 33.5 G/DL (ref 32–36)
MCV RBC AUTO: 98 FL (ref 82–98)
MONOCYTES # BLD AUTO: 0.9 K/UL (ref 0.3–1)
MONOCYTES NFR BLD: 13.6 % (ref 4–15)
NEUTROPHILS # BLD AUTO: 3.7 K/UL (ref 1.8–7.7)
NEUTROPHILS NFR BLD: 53.3 % (ref 38–73)
NONHDLC SERPL-MCNC: 80 MG/DL
NRBC BLD-RTO: 0 /100 WBC
PLATELET # BLD AUTO: 246 K/UL (ref 150–450)
PMV BLD AUTO: 10.6 FL (ref 9.2–12.9)
POTASSIUM SERPL-SCNC: 4.5 MMOL/L (ref 3.5–5.1)
PROT SERPL-MCNC: 6.8 G/DL (ref 6–8.4)
RBC # BLD AUTO: 4.72 M/UL (ref 4.6–6.2)
SODIUM SERPL-SCNC: 138 MMOL/L (ref 136–145)
TESTOST SERPL-MCNC: 316 NG/DL (ref 304–1227)
TRIGL SERPL-MCNC: 59 MG/DL (ref 30–150)
WBC # BLD AUTO: 6.91 K/UL (ref 3.9–12.7)

## 2021-09-30 PROCEDURE — 83002 ASSAY OF GONADOTROPIN (LH): CPT | Performed by: INTERNAL MEDICINE

## 2021-09-30 PROCEDURE — 82306 VITAMIN D 25 HYDROXY: CPT | Performed by: INTERNAL MEDICINE

## 2021-09-30 PROCEDURE — 36415 COLL VENOUS BLD VENIPUNCTURE: CPT | Mod: PO | Performed by: INTERNAL MEDICINE

## 2021-09-30 PROCEDURE — 82550 ASSAY OF CK (CPK): CPT | Performed by: INTERNAL MEDICINE

## 2021-09-30 PROCEDURE — 84402 ASSAY OF FREE TESTOSTERONE: CPT | Performed by: INTERNAL MEDICINE

## 2021-09-30 PROCEDURE — 84153 ASSAY OF PSA TOTAL: CPT | Performed by: INTERNAL MEDICINE

## 2021-09-30 PROCEDURE — 85025 COMPLETE CBC W/AUTO DIFF WBC: CPT | Performed by: INTERNAL MEDICINE

## 2021-09-30 PROCEDURE — 84403 ASSAY OF TOTAL TESTOSTERONE: CPT | Performed by: INTERNAL MEDICINE

## 2021-09-30 PROCEDURE — 80061 LIPID PANEL: CPT | Mod: GA | Performed by: INTERNAL MEDICINE

## 2021-09-30 PROCEDURE — 80053 COMPREHEN METABOLIC PANEL: CPT | Performed by: INTERNAL MEDICINE

## 2021-10-05 LAB — TESTOST FREE SERPL-MCNC: 1.5 PG/ML

## 2021-10-06 ENCOUNTER — OFFICE VISIT (OUTPATIENT)
Dept: OTOLARYNGOLOGY | Facility: CLINIC | Age: 76
End: 2021-10-06
Payer: MEDICARE

## 2021-10-06 VITALS — HEIGHT: 68 IN | WEIGHT: 163.38 LBS | BODY MASS INDEX: 24.76 KG/M2

## 2021-10-06 DIAGNOSIS — H61.23 BILATERAL IMPACTED CERUMEN: ICD-10-CM

## 2021-10-06 DIAGNOSIS — H91.93 BILATERAL HEARING LOSS, UNSPECIFIED HEARING LOSS TYPE: Primary | ICD-10-CM

## 2021-10-06 PROCEDURE — 99499 UNLISTED E&M SERVICE: CPT | Mod: S$GLB,,, | Performed by: NURSE PRACTITIONER

## 2021-10-06 PROCEDURE — 69210 REMOVE IMPACTED EAR WAX UNI: CPT | Mod: S$GLB,,, | Performed by: NURSE PRACTITIONER

## 2021-10-06 PROCEDURE — 1159F PR MEDICATION LIST DOCUMENTED IN MEDICAL RECORD: ICD-10-PCS | Mod: CPTII,S$GLB,, | Performed by: NURSE PRACTITIONER

## 2021-10-06 PROCEDURE — 99999 PR PBB SHADOW E&M-EST. PATIENT-LVL III: ICD-10-PCS | Mod: PBBFAC,,, | Performed by: NURSE PRACTITIONER

## 2021-10-06 PROCEDURE — 3288F PR FALLS RISK ASSESSMENT DOCUMENTED: ICD-10-PCS | Mod: CPTII,S$GLB,, | Performed by: NURSE PRACTITIONER

## 2021-10-06 PROCEDURE — 1101F PT FALLS ASSESS-DOCD LE1/YR: CPT | Mod: CPTII,S$GLB,, | Performed by: NURSE PRACTITIONER

## 2021-10-06 PROCEDURE — 1160F RVW MEDS BY RX/DR IN RCRD: CPT | Mod: CPTII,S$GLB,, | Performed by: NURSE PRACTITIONER

## 2021-10-06 PROCEDURE — 99499 NO LOS: ICD-10-PCS | Mod: S$GLB,,, | Performed by: NURSE PRACTITIONER

## 2021-10-06 PROCEDURE — 69210 PR REMOVAL IMPACTED CERUMEN REQUIRING INSTRUMENTATION, UNILATERAL: ICD-10-PCS | Mod: S$GLB,,, | Performed by: NURSE PRACTITIONER

## 2021-10-06 PROCEDURE — 99999 PR PBB SHADOW E&M-EST. PATIENT-LVL III: CPT | Mod: PBBFAC,,, | Performed by: NURSE PRACTITIONER

## 2021-10-06 PROCEDURE — 3288F FALL RISK ASSESSMENT DOCD: CPT | Mod: CPTII,S$GLB,, | Performed by: NURSE PRACTITIONER

## 2021-10-06 PROCEDURE — 1101F PR PT FALLS ASSESS DOC 0-1 FALLS W/OUT INJ PAST YR: ICD-10-PCS | Mod: CPTII,S$GLB,, | Performed by: NURSE PRACTITIONER

## 2021-10-06 PROCEDURE — 1159F MED LIST DOCD IN RCRD: CPT | Mod: CPTII,S$GLB,, | Performed by: NURSE PRACTITIONER

## 2021-10-06 PROCEDURE — 1160F PR REVIEW ALL MEDS BY PRESCRIBER/CLIN PHARMACIST DOCUMENTED: ICD-10-PCS | Mod: CPTII,S$GLB,, | Performed by: NURSE PRACTITIONER

## 2021-10-06 PROCEDURE — 1126F AMNT PAIN NOTED NONE PRSNT: CPT | Mod: CPTII,S$GLB,, | Performed by: NURSE PRACTITIONER

## 2021-10-06 PROCEDURE — 1126F PR PAIN SEVERITY QUANTIFIED, NO PAIN PRESENT: ICD-10-PCS | Mod: CPTII,S$GLB,, | Performed by: NURSE PRACTITIONER

## 2021-10-21 ENCOUNTER — CLINICAL SUPPORT (OUTPATIENT)
Dept: CARDIOLOGY | Facility: HOSPITAL | Age: 76
End: 2021-10-21
Payer: MEDICARE

## 2021-10-21 DIAGNOSIS — Z95.0 PRESENCE OF CARDIAC PACEMAKER: ICD-10-CM

## 2021-10-21 PROCEDURE — 93294 CARDIAC DEVICE CHECK - REMOTE: ICD-10-PCS | Mod: ,,, | Performed by: INTERNAL MEDICINE

## 2021-10-21 PROCEDURE — 93294 REM INTERROG EVL PM/LDLS PM: CPT | Mod: ,,, | Performed by: INTERNAL MEDICINE

## 2021-10-21 PROCEDURE — 93296 REM INTERROG EVL PM/IDS: CPT | Mod: PO | Performed by: INTERNAL MEDICINE

## 2021-10-26 ENCOUNTER — IMMUNIZATION (OUTPATIENT)
Dept: FAMILY MEDICINE | Facility: CLINIC | Age: 76
End: 2021-10-26
Payer: MEDICARE

## 2021-10-26 ENCOUNTER — CLINICAL SUPPORT (OUTPATIENT)
Dept: CARDIOLOGY | Facility: HOSPITAL | Age: 76
End: 2021-10-26
Attending: INTERNAL MEDICINE
Payer: MEDICARE

## 2021-10-26 VITALS — BODY MASS INDEX: 24.76 KG/M2 | HEIGHT: 68 IN | HEART RATE: 84 BPM | WEIGHT: 163.38 LBS

## 2021-10-26 DIAGNOSIS — Z23 NEED FOR VACCINATION: Primary | ICD-10-CM

## 2021-10-26 LAB
ASCENDING AORTA: 2.73 CM
AV INDEX (PROSTH): 0.82
AV MEAN GRADIENT: 3 MMHG
AV PEAK GRADIENT: 5 MMHG
AV VALVE AREA: 3.08 CM2
AV VELOCITY RATIO: 0.96
BSA FOR ECHO PROCEDURE: 1.89 M2
CV ECHO LV RWT: 0.34 CM
DOP CALC AO PEAK VEL: 1.12 M/S
DOP CALC AO VTI: 26.86 CM
DOP CALC LVOT AREA: 3.8 CM2
DOP CALC LVOT DIAMETER: 2.19 CM
DOP CALC LVOT PEAK VEL: 1.07 M/S
DOP CALC LVOT STROKE VOLUME: 82.75 CM3
DOP CALCLVOT PEAK VEL VTI: 21.98 CM
E WAVE DECELERATION TIME: 226.27 MSEC
E/A RATIO: 0.57
E/E' RATIO: 6.93 M/S
ECHO LV POSTERIOR WALL: 0.77 CM (ref 0.6–1.1)
EJECTION FRACTION: 55 %
FRACTIONAL SHORTENING: 32 % (ref 28–44)
INTERVENTRICULAR SEPTUM: 1.01 CM (ref 0.6–1.1)
IVRT: 125.59 MSEC
LA MAJOR: 5.04 CM
LA MINOR: 4.78 CM
LA WIDTH: 3.41 CM
LEFT ATRIUM SIZE: 3.74 CM
LEFT ATRIUM VOLUME INDEX: 28.3 ML/M2
LEFT ATRIUM VOLUME: 53.19 CM3
LEFT INTERNAL DIMENSION IN SYSTOLE: 3.07 CM (ref 2.1–4)
LEFT VENTRICLE DIASTOLIC VOLUME INDEX: 49.33 ML/M2
LEFT VENTRICLE DIASTOLIC VOLUME: 92.74 ML
LEFT VENTRICLE MASS INDEX: 70 G/M2
LEFT VENTRICLE SYSTOLIC VOLUME INDEX: 19.6 ML/M2
LEFT VENTRICLE SYSTOLIC VOLUME: 36.91 ML
LEFT VENTRICULAR INTERNAL DIMENSION IN DIASTOLE: 4.51 CM (ref 3.5–6)
LEFT VENTRICULAR MASS: 131.33 G
LV LATERAL E/E' RATIO: 5.2 M/S
LV SEPTAL E/E' RATIO: 10.4 M/S
MV A" WAVE DURATION": 7.14 MSEC
MV PEAK A VEL: 0.92 M/S
MV PEAK E VEL: 0.52 M/S
MV STENOSIS PRESSURE HALF TIME: 65.62 MS
MV VALVE AREA P 1/2 METHOD: 3.35 CM2
PISA TR MAX VEL: 2.62 M/S
PULM VEIN S/D RATIO: 1.25
PV PEAK D VEL: 0.56 M/S
PV PEAK S VEL: 0.7 M/S
RA MAJOR: 4.96 CM
RA PRESSURE: 3 MMHG
RA WIDTH: 3.99 CM
RIGHT VENTRICULAR END-DIASTOLIC DIMENSION: 3.89 CM
RV TISSUE DOPPLER FREE WALL SYSTOLIC VELOCITY 1 (APICAL 4 CHAMBER VIEW): 16.66 CM/S
SINUS: 3.25 CM
STJ: 2.85 CM
TDI LATERAL: 0.1 M/S
TDI SEPTAL: 0.05 M/S
TDI: 0.08 M/S
TR MAX PG: 27 MMHG
TRICUSPID ANNULAR PLANE SYSTOLIC EXCURSION: 3.03 CM
TV REST PULMONARY ARTERY PRESSURE: 30 MMHG

## 2021-10-26 PROCEDURE — 0003A COVID-19, MRNA, LNP-S, PF, 30 MCG/0.3 ML DOSE VACCINE: CPT | Mod: PBBFAC | Performed by: FAMILY MEDICINE

## 2021-10-26 PROCEDURE — 91300 COVID-19, MRNA, LNP-S, PF, 30 MCG/0.3 ML DOSE VACCINE: CPT | Mod: PBBFAC | Performed by: FAMILY MEDICINE

## 2021-10-26 PROCEDURE — 93306 TTE W/DOPPLER COMPLETE: CPT | Mod: PO

## 2021-11-01 ENCOUNTER — CLINICAL SUPPORT (OUTPATIENT)
Dept: CARDIOLOGY | Facility: HOSPITAL | Age: 76
End: 2021-11-01
Attending: INTERNAL MEDICINE
Payer: MEDICARE

## 2021-11-01 ENCOUNTER — OFFICE VISIT (OUTPATIENT)
Dept: CARDIOLOGY | Facility: CLINIC | Age: 76
End: 2021-11-01
Payer: MEDICARE

## 2021-11-01 VITALS
SYSTOLIC BLOOD PRESSURE: 181 MMHG | DIASTOLIC BLOOD PRESSURE: 77 MMHG | BODY MASS INDEX: 25.16 KG/M2 | WEIGHT: 166 LBS | HEART RATE: 56 BPM | HEIGHT: 68 IN

## 2021-11-01 DIAGNOSIS — Z95.5 S/P CORONARY ARTERY STENT PLACEMENT: ICD-10-CM

## 2021-11-01 DIAGNOSIS — I49.5 SSS (SICK SINUS SYNDROME): ICD-10-CM

## 2021-11-01 DIAGNOSIS — I10 PRIMARY HYPERTENSION: Primary | ICD-10-CM

## 2021-11-01 DIAGNOSIS — Z95.0 PACEMAKER: ICD-10-CM

## 2021-11-01 PROCEDURE — 99499 UNLISTED E&M SERVICE: CPT | Mod: S$GLB,,, | Performed by: INTERNAL MEDICINE

## 2021-11-01 PROCEDURE — 93280 CARDIAC DEVICE CHECK - IN CLINIC & HOSPITAL: ICD-10-PCS | Mod: 26,,, | Performed by: INTERNAL MEDICINE

## 2021-11-01 PROCEDURE — 1159F MED LIST DOCD IN RCRD: CPT | Mod: CPTII,S$GLB,, | Performed by: INTERNAL MEDICINE

## 2021-11-01 PROCEDURE — 1126F PR PAIN SEVERITY QUANTIFIED, NO PAIN PRESENT: ICD-10-PCS | Mod: CPTII,S$GLB,, | Performed by: INTERNAL MEDICINE

## 2021-11-01 PROCEDURE — 99214 OFFICE O/P EST MOD 30 MIN: CPT | Mod: S$GLB,,, | Performed by: INTERNAL MEDICINE

## 2021-11-01 PROCEDURE — 1126F AMNT PAIN NOTED NONE PRSNT: CPT | Mod: CPTII,S$GLB,, | Performed by: INTERNAL MEDICINE

## 2021-11-01 PROCEDURE — 99214 PR OFFICE/OUTPT VISIT, EST, LEVL IV, 30-39 MIN: ICD-10-PCS | Mod: S$GLB,,, | Performed by: INTERNAL MEDICINE

## 2021-11-01 PROCEDURE — 3078F DIAST BP <80 MM HG: CPT | Mod: CPTII,S$GLB,, | Performed by: INTERNAL MEDICINE

## 2021-11-01 PROCEDURE — 3077F SYST BP >= 140 MM HG: CPT | Mod: CPTII,S$GLB,, | Performed by: INTERNAL MEDICINE

## 2021-11-01 PROCEDURE — 1159F PR MEDICATION LIST DOCUMENTED IN MEDICAL RECORD: ICD-10-PCS | Mod: CPTII,S$GLB,, | Performed by: INTERNAL MEDICINE

## 2021-11-01 PROCEDURE — 93280 PM DEVICE PROGR EVAL DUAL: CPT | Mod: 26,,, | Performed by: INTERNAL MEDICINE

## 2021-11-01 PROCEDURE — 3077F PR MOST RECENT SYSTOLIC BLOOD PRESSURE >= 140 MM HG: ICD-10-PCS | Mod: CPTII,S$GLB,, | Performed by: INTERNAL MEDICINE

## 2021-11-01 PROCEDURE — 99499 RISK ADDL DX/OHS AUDIT: ICD-10-PCS | Mod: S$GLB,,, | Performed by: INTERNAL MEDICINE

## 2021-11-01 PROCEDURE — 99999 PR PBB SHADOW E&M-EST. PATIENT-LVL III: ICD-10-PCS | Mod: PBBFAC,,, | Performed by: INTERNAL MEDICINE

## 2021-11-01 PROCEDURE — 1160F PR REVIEW ALL MEDS BY PRESCRIBER/CLIN PHARMACIST DOCUMENTED: ICD-10-PCS | Mod: CPTII,S$GLB,, | Performed by: INTERNAL MEDICINE

## 2021-11-01 PROCEDURE — 3078F PR MOST RECENT DIASTOLIC BLOOD PRESSURE < 80 MM HG: ICD-10-PCS | Mod: CPTII,S$GLB,, | Performed by: INTERNAL MEDICINE

## 2021-11-01 PROCEDURE — 1160F RVW MEDS BY RX/DR IN RCRD: CPT | Mod: CPTII,S$GLB,, | Performed by: INTERNAL MEDICINE

## 2021-11-01 PROCEDURE — 99999 PR PBB SHADOW E&M-EST. PATIENT-LVL III: CPT | Mod: PBBFAC,,, | Performed by: INTERNAL MEDICINE

## 2021-11-03 ENCOUNTER — PES CALL (OUTPATIENT)
Dept: ADMINISTRATIVE | Facility: CLINIC | Age: 76
End: 2021-11-03
Payer: MEDICARE

## 2021-12-29 ENCOUNTER — OFFICE VISIT (OUTPATIENT)
Dept: UROLOGY | Facility: CLINIC | Age: 76
End: 2021-12-29
Payer: MEDICARE

## 2021-12-29 ENCOUNTER — OFFICE VISIT (OUTPATIENT)
Dept: FAMILY MEDICINE | Facility: CLINIC | Age: 76
End: 2021-12-29
Payer: MEDICARE

## 2021-12-29 VITALS
HEIGHT: 68 IN | SYSTOLIC BLOOD PRESSURE: 132 MMHG | WEIGHT: 170 LBS | DIASTOLIC BLOOD PRESSURE: 72 MMHG | OXYGEN SATURATION: 97 % | TEMPERATURE: 98 F | HEART RATE: 73 BPM | BODY MASS INDEX: 25.76 KG/M2

## 2021-12-29 DIAGNOSIS — F32.A DEPRESSION, UNSPECIFIED DEPRESSION TYPE: ICD-10-CM

## 2021-12-29 DIAGNOSIS — N39.41 URGE INCONTINENCE: Primary | ICD-10-CM

## 2021-12-29 DIAGNOSIS — Z00.00 PREVENTATIVE HEALTH CARE: Primary | ICD-10-CM

## 2021-12-29 PROCEDURE — 99213 OFFICE O/P EST LOW 20 MIN: CPT | Mod: S$GLB,,, | Performed by: UROLOGY

## 2021-12-29 PROCEDURE — 99999 PR PBB SHADOW E&M-EST. PATIENT-LVL IV: CPT | Mod: PBBFAC,,, | Performed by: FAMILY MEDICINE

## 2021-12-29 PROCEDURE — 1159F MED LIST DOCD IN RCRD: CPT | Mod: CPTII,S$GLB,, | Performed by: FAMILY MEDICINE

## 2021-12-29 PROCEDURE — 99999 PR PBB SHADOW E&M-EST. PATIENT-LVL IV: ICD-10-PCS | Mod: PBBFAC,,, | Performed by: FAMILY MEDICINE

## 2021-12-29 PROCEDURE — 3075F PR MOST RECENT SYSTOLIC BLOOD PRESS GE 130-139MM HG: ICD-10-PCS | Mod: CPTII,S$GLB,, | Performed by: FAMILY MEDICINE

## 2021-12-29 PROCEDURE — 1101F PR PT FALLS ASSESS DOC 0-1 FALLS W/OUT INJ PAST YR: ICD-10-PCS | Mod: CPTII,S$GLB,, | Performed by: FAMILY MEDICINE

## 2021-12-29 PROCEDURE — 99999 PR PBB SHADOW E&M-EST. PATIENT-LVL II: CPT | Mod: PBBFAC,,, | Performed by: UROLOGY

## 2021-12-29 PROCEDURE — 1159F PR MEDICATION LIST DOCUMENTED IN MEDICAL RECORD: ICD-10-PCS | Mod: CPTII,S$GLB,, | Performed by: UROLOGY

## 2021-12-29 PROCEDURE — 99213 PR OFFICE/OUTPT VISIT, EST, LEVL III, 20-29 MIN: ICD-10-PCS | Mod: S$GLB,,, | Performed by: FAMILY MEDICINE

## 2021-12-29 PROCEDURE — 1126F PR PAIN SEVERITY QUANTIFIED, NO PAIN PRESENT: ICD-10-PCS | Mod: CPTII,S$GLB,, | Performed by: FAMILY MEDICINE

## 2021-12-29 PROCEDURE — 1159F PR MEDICATION LIST DOCUMENTED IN MEDICAL RECORD: ICD-10-PCS | Mod: CPTII,S$GLB,, | Performed by: FAMILY MEDICINE

## 2021-12-29 PROCEDURE — 1101F PT FALLS ASSESS-DOCD LE1/YR: CPT | Mod: CPTII,S$GLB,, | Performed by: FAMILY MEDICINE

## 2021-12-29 PROCEDURE — 99213 PR OFFICE/OUTPT VISIT, EST, LEVL III, 20-29 MIN: ICD-10-PCS | Mod: S$GLB,,, | Performed by: UROLOGY

## 2021-12-29 PROCEDURE — 3078F PR MOST RECENT DIASTOLIC BLOOD PRESSURE < 80 MM HG: ICD-10-PCS | Mod: CPTII,S$GLB,, | Performed by: FAMILY MEDICINE

## 2021-12-29 PROCEDURE — 3075F SYST BP GE 130 - 139MM HG: CPT | Mod: CPTII,S$GLB,, | Performed by: FAMILY MEDICINE

## 2021-12-29 PROCEDURE — 3288F PR FALLS RISK ASSESSMENT DOCUMENTED: ICD-10-PCS | Mod: CPTII,S$GLB,, | Performed by: FAMILY MEDICINE

## 2021-12-29 PROCEDURE — 3078F DIAST BP <80 MM HG: CPT | Mod: CPTII,S$GLB,, | Performed by: FAMILY MEDICINE

## 2021-12-29 PROCEDURE — 1126F AMNT PAIN NOTED NONE PRSNT: CPT | Mod: CPTII,S$GLB,, | Performed by: UROLOGY

## 2021-12-29 PROCEDURE — 3288F FALL RISK ASSESSMENT DOCD: CPT | Mod: CPTII,S$GLB,, | Performed by: FAMILY MEDICINE

## 2021-12-29 PROCEDURE — 99999 PR PBB SHADOW E&M-EST. PATIENT-LVL II: ICD-10-PCS | Mod: PBBFAC,,, | Performed by: UROLOGY

## 2021-12-29 PROCEDURE — 1159F MED LIST DOCD IN RCRD: CPT | Mod: CPTII,S$GLB,, | Performed by: UROLOGY

## 2021-12-29 PROCEDURE — 99213 OFFICE O/P EST LOW 20 MIN: CPT | Mod: S$GLB,,, | Performed by: FAMILY MEDICINE

## 2021-12-29 PROCEDURE — 1126F AMNT PAIN NOTED NONE PRSNT: CPT | Mod: CPTII,S$GLB,, | Performed by: FAMILY MEDICINE

## 2021-12-29 PROCEDURE — 1126F PR PAIN SEVERITY QUANTIFIED, NO PAIN PRESENT: ICD-10-PCS | Mod: CPTII,S$GLB,, | Performed by: UROLOGY

## 2021-12-29 RX ORDER — OXYBUTYNIN CHLORIDE 10 MG/1
10 TABLET, EXTENDED RELEASE ORAL DAILY
Qty: 30 TABLET | Refills: 11 | Status: SHIPPED | OUTPATIENT
Start: 2021-12-29 | End: 2023-07-11

## 2021-12-29 RX ORDER — BUPROPION HYDROCHLORIDE 150 MG/1
150 TABLET ORAL DAILY
Qty: 30 TABLET | Refills: 11 | Status: SHIPPED | OUTPATIENT
Start: 2021-12-29 | End: 2022-02-01 | Stop reason: SDUPTHER

## 2021-12-30 ENCOUNTER — PES CALL (OUTPATIENT)
Dept: ADMINISTRATIVE | Facility: CLINIC | Age: 76
End: 2021-12-30
Payer: MEDICARE

## 2022-01-12 NOTE — PROGRESS NOTES
Subjective:       Patient ID: José Miguel Austin is a 76 y.o. male.    Chief Complaint: Follow-up    F/U for Bph, urgency, UI.  Failed trial of myrbetriq.  Notes longstanding urgency and UI.  PSA, Screen (ng/mL)       Date                     Value                 09/30/2021               0.93             ----------  PSA Total (ng/mL)       Date                     Value                 10/04/2017               0.46             ----------  PSA, Free (ng/mL)       Date                     Value                 10/04/2017               0.13             ----------  PSA, Free % (%)       Date                     Value                 10/04/2017               28.26            ----------        Past Medical History:   Diagnosis Date    Basal cell carcinoma     CAD (coronary artery disease)     Hypertension       Past Surgical History:   Procedure Laterality Date    APPENDECTOMY      BASAL CELL CARCINOMA EXCISION      left forehead    CARDIAC PACEMAKER PLACEMENT      CIRCUMCISION      CORONARY STENT PLACEMENT      4 stents in RCA    inguinal hernia      LUMBAR DISC SURGERY  1998     Social History     Socioeconomic History    Marital status:     Number of children: 3   Occupational History    Occupation: retired [employer]     Occupation: 8,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     Comment: resting heart rate 50    [place]ation[place]    Occupation: 3 times a week gym   Tobacco Use    Smoking status: Never Smoker    Smokeless tobacco: Never Used   Substance and Sexual Activity    Alcohol use: No    Drug use: No   Social History Narrative    Exercise: gym 2-3 times per week        Cycles 5 days a week       Family History   Problem Relation Age of Onset    Heart disease Mother     Heart disease Father     Heart disease Sister     Heart disease Brother       Review of patient's allergies indicates:   Allergen Reactions    Hydrocodone Nausea Only    Codeine Nausea  Only     Medication List with Changes/Refills   New Medications    OXYBUTYNIN (DITROPAN-XL) 10 MG 24 HR TABLET    Take 1 tablet (10 mg total) by mouth once daily.   Current Medications    ALPROSTADIL (EDEX) 20 MCG INJECTION    20 mcg by Intracavitary route as needed for Erectile Dysfunction. use no more than 3 times per week    ALPROSTADIL (EDEX) 20 MCG INJECTION    20 mcg by Intracavitary route as needed for Erectile Dysfunction. use no more than 3 times per week    BUPROPION (WELLBUTRIN XL) 150 MG TB24 TABLET    Take 1 tablet (150 mg total) by mouth once daily.    COENZYME Q10 100 MG CAPSULE    Take 100 mg by mouth once daily.    FISH OIL-OMEGA-3 FATTY ACIDS 300-1,000 MG CAPSULE    Take by mouth once daily.    MULTIVITAMIN CAPSULE    Take 1 capsule by mouth once daily.    PRASTERONE, DHEA, 50 MG TAB    Take by mouth once daily.    PRAVASTATIN (PRAVACHOL) 40 MG TABLET    TAKE 1 TABLET BY MOUTH DAILY    TESTOSTERONE CYPIONATE (DEPOTESTOTERONE CYPIONATE) 200 MG/ML INJECTION    Inject 200 mg into the muscle every 30 days.       Review of Systems   Constitutional: Negative for fatigue, fever and unexpected weight change.   HENT: Negative for congestion.    Eyes: Negative for visual disturbance.   Respiratory: Negative for cough, choking and shortness of breath.    Cardiovascular: Negative for chest pain and leg swelling.   Gastrointestinal: Negative for abdominal distention, constipation, diarrhea, nausea and vomiting.   Endocrine: Negative for polyuria.   Genitourinary: Positive for urgency. Negative for difficulty urinating, flank pain, hematuria and testicular pain.   Musculoskeletal: Negative for back pain.   Skin: Negative for color change and wound.   Allergic/Immunologic: Negative for immunocompromised state.   Neurological: Negative for weakness.   Hematological: Negative for adenopathy. Does not bruise/bleed easily.   Psychiatric/Behavioral: Negative for confusion.       Objective:      Physical  Exam  Constitutional:       Appearance: Normal appearance.   Eyes:      Pupils: Pupils are equal, round, and reactive to light.   Cardiovascular:      Rate and Rhythm: Normal rate.      Pulses: Normal pulses.   Pulmonary:      Effort: Pulmonary effort is normal.   Abdominal:      General: Abdomen is flat. There is no distension.      Palpations: Abdomen is soft. There is no mass.   Genitourinary:     Penis: Normal.       Testes: Normal.   Musculoskeletal:         General: No swelling.      Cervical back: Neck supple.   Skin:     General: Skin is warm and dry.   Neurological:      General: No focal deficit present.      Mental Status: He is alert and oriented to person, place, and time.           Sodium   Date Value Ref Range Status   09/30/2021 138 136 - 145 mmol/L Final     Potassium   Date Value Ref Range Status   09/30/2021 4.5 3.5 - 5.1 mmol/L Final     Chloride   Date Value Ref Range Status   09/30/2021 105 95 - 110 mmol/L Final     CO2   Date Value Ref Range Status   09/30/2021 22 (L) 23 - 29 mmol/L Final     Creatinine   Date Value Ref Range Status   09/30/2021 1.3 0.5 - 1.4 mg/dL Final     Glucose   Date Value Ref Range Status   09/30/2021 82 70 - 110 mg/dL Final     AST   Date Value Ref Range Status   09/30/2021 36 10 - 40 U/L Final     ALT   Date Value Ref Range Status   09/30/2021 31 10 - 44 U/L Final     WBC   Date Value Ref Range Status   09/30/2021 6.91 3.90 - 12.70 K/uL Final     Hemoglobin   Date Value Ref Range Status   09/30/2021 15.5 14.0 - 18.0 g/dL Final     Hematocrit   Date Value Ref Range Status   09/30/2021 46.2 40.0 - 54.0 % Final     Platelets   Date Value Ref Range Status   09/30/2021 246 150 - 450 K/uL Final     INR   Date Value Ref Range Status   09/02/2015 1.0 0.8 - 1.2 Final     Comment:     Coumadin Therapy:  2.0 - 3.0 for INR for all indicators except mechanical heart valves  and antiphospholipid syndromes which should use 2.5 - 3.5.       PSA, Screen   Date Value Ref Range Status    09/30/2021 0.93 0.00 - 4.00 ng/mL Final     Comment:     PSA Expected levels:  Hormonal Therapy: <0.05 ng/ml  Prostatectomy: <0.01 ng/ml  Radiation Therapy: <1.00 ng/ml            Assessment/Plan:       Problem List Items Addressed This Visit        Renal/    Urge incontinence - Primary          -dicsussed options including medications, botox, interstim  -trial of ditropan  -f/u 6-8 weeks

## 2022-01-19 ENCOUNTER — CLINICAL SUPPORT (OUTPATIENT)
Dept: CARDIOLOGY | Facility: HOSPITAL | Age: 77
End: 2022-01-19
Payer: MEDICARE

## 2022-01-19 DIAGNOSIS — Z95.818 PRESENCE OF OTHER CARDIAC IMPLANTS AND GRAFTS: ICD-10-CM

## 2022-01-19 DIAGNOSIS — Z95.0 PRESENCE OF CARDIAC PACEMAKER: ICD-10-CM

## 2022-01-19 PROCEDURE — 93294 CARDIAC DEVICE CHECK - REMOTE: ICD-10-PCS | Mod: ,,, | Performed by: INTERNAL MEDICINE

## 2022-01-19 PROCEDURE — 93296 REM INTERROG EVL PM/IDS: CPT | Mod: PO | Performed by: INTERNAL MEDICINE

## 2022-01-19 PROCEDURE — 93294 REM INTERROG EVL PM/LDLS PM: CPT | Mod: ,,, | Performed by: INTERNAL MEDICINE

## 2022-02-01 ENCOUNTER — OFFICE VISIT (OUTPATIENT)
Dept: FAMILY MEDICINE | Facility: CLINIC | Age: 77
End: 2022-02-01
Payer: MEDICARE

## 2022-02-01 VITALS
WEIGHT: 166.88 LBS | RESPIRATION RATE: 18 BRPM | BODY MASS INDEX: 25.29 KG/M2 | TEMPERATURE: 98 F | HEART RATE: 64 BPM | OXYGEN SATURATION: 96 % | SYSTOLIC BLOOD PRESSURE: 134 MMHG | HEIGHT: 68 IN | DIASTOLIC BLOOD PRESSURE: 70 MMHG

## 2022-02-01 DIAGNOSIS — F32.A DEPRESSION, UNSPECIFIED DEPRESSION TYPE: Primary | ICD-10-CM

## 2022-02-01 PROCEDURE — 3078F DIAST BP <80 MM HG: CPT | Mod: CPTII,S$GLB,, | Performed by: FAMILY MEDICINE

## 2022-02-01 PROCEDURE — 99213 PR OFFICE/OUTPT VISIT, EST, LEVL III, 20-29 MIN: ICD-10-PCS | Mod: S$GLB,,, | Performed by: FAMILY MEDICINE

## 2022-02-01 PROCEDURE — 99999 PR PBB SHADOW E&M-EST. PATIENT-LVL IV: ICD-10-PCS | Mod: PBBFAC,,, | Performed by: FAMILY MEDICINE

## 2022-02-01 PROCEDURE — 1101F PR PT FALLS ASSESS DOC 0-1 FALLS W/OUT INJ PAST YR: ICD-10-PCS | Mod: CPTII,S$GLB,, | Performed by: FAMILY MEDICINE

## 2022-02-01 PROCEDURE — 1126F AMNT PAIN NOTED NONE PRSNT: CPT | Mod: CPTII,S$GLB,, | Performed by: FAMILY MEDICINE

## 2022-02-01 PROCEDURE — 1101F PT FALLS ASSESS-DOCD LE1/YR: CPT | Mod: CPTII,S$GLB,, | Performed by: FAMILY MEDICINE

## 2022-02-01 PROCEDURE — 3288F FALL RISK ASSESSMENT DOCD: CPT | Mod: CPTII,S$GLB,, | Performed by: FAMILY MEDICINE

## 2022-02-01 PROCEDURE — 1159F MED LIST DOCD IN RCRD: CPT | Mod: CPTII,S$GLB,, | Performed by: FAMILY MEDICINE

## 2022-02-01 PROCEDURE — 3075F PR MOST RECENT SYSTOLIC BLOOD PRESS GE 130-139MM HG: ICD-10-PCS | Mod: CPTII,S$GLB,, | Performed by: FAMILY MEDICINE

## 2022-02-01 PROCEDURE — 3288F PR FALLS RISK ASSESSMENT DOCUMENTED: ICD-10-PCS | Mod: CPTII,S$GLB,, | Performed by: FAMILY MEDICINE

## 2022-02-01 PROCEDURE — 99999 PR PBB SHADOW E&M-EST. PATIENT-LVL IV: CPT | Mod: PBBFAC,,, | Performed by: FAMILY MEDICINE

## 2022-02-01 PROCEDURE — 3075F SYST BP GE 130 - 139MM HG: CPT | Mod: CPTII,S$GLB,, | Performed by: FAMILY MEDICINE

## 2022-02-01 PROCEDURE — 99213 OFFICE O/P EST LOW 20 MIN: CPT | Mod: S$GLB,,, | Performed by: FAMILY MEDICINE

## 2022-02-01 PROCEDURE — 1126F PR PAIN SEVERITY QUANTIFIED, NO PAIN PRESENT: ICD-10-PCS | Mod: CPTII,S$GLB,, | Performed by: FAMILY MEDICINE

## 2022-02-01 PROCEDURE — 1159F PR MEDICATION LIST DOCUMENTED IN MEDICAL RECORD: ICD-10-PCS | Mod: CPTII,S$GLB,, | Performed by: FAMILY MEDICINE

## 2022-02-01 PROCEDURE — 3078F PR MOST RECENT DIASTOLIC BLOOD PRESSURE < 80 MM HG: ICD-10-PCS | Mod: CPTII,S$GLB,, | Performed by: FAMILY MEDICINE

## 2022-02-01 RX ORDER — BUPROPION HYDROCHLORIDE 150 MG/1
150 TABLET ORAL DAILY
Qty: 90 TABLET | Refills: 3 | Status: SHIPPED | OUTPATIENT
Start: 2022-02-01 | End: 2022-08-01 | Stop reason: SDUPTHER

## 2022-02-01 NOTE — PROGRESS NOTES
75 year old male patient presents for 1 month f/u on Wellbutrin    He feels like he has improved with this.  Some improved concentration, focus, decreased irritability  Denies SE on medication    Initially c/o depression, apathy, depression, irritable, frustrations and trouble concentrating.    Getting testosterone injections twice monthly and following with Dr. Barrientos.   CAD s/p stents - following with Dr. Rothman, tolerating Pravachol 40mg daily  Hypogonadism - following with Dr. Barrientos  He exercises regularly 2-3 weeks with weights.      Past Medical History:   Diagnosis Date    Basal cell carcinoma     CAD (coronary artery disease)     Hypertension        Past Surgical History:   Procedure Laterality Date    APPENDECTOMY      BASAL CELL CARCINOMA EXCISION      left forehead    CARDIAC PACEMAKER PLACEMENT      CIRCUMCISION      CORONARY STENT PLACEMENT      4 stents in RCA    inguinal hernia      LUMBAR DISC SURGERY  1998       Allergies   Allergen Reactions    Hydrocodone Nausea Only       Social History     Socioeconomic History    Marital status:     Number of children: 3   Occupational History    Occupation: retired [employer]     Occupation: 8,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 use no more than 3 times per week 5 vial 12    alprostadiL (EDEX) 20 mcg injection 20 mcg by Intracavitary route as needed for Erectile Dysfunction.  use no more than 3 times per week 5 vial 12    buPROPion (WELLBUTRIN XL) 150 MG TB24 tablet Take 1 tablet (150 mg total) by mouth once daily. 30 tablet 11    coenzyme Q10 100 mg capsule Take 100 mg by mouth once daily.      fish oil-omega-3 fatty acids 300-1,000 mg capsule Take by mouth once daily.      multivitamin capsule Take 1 capsule by mouth once daily.      oxybutynin (DITROPAN-XL) 10 MG 24 hr tablet Take 1 tablet (10 mg total) by mouth once daily. 30 tablet 11    prasterone, dhea, 50 mg Tab Take by mouth once daily.      pravastatin (PRAVACHOL) 40 MG tablet TAKE 1 TABLET BY MOUTH DAILY 90 tablet 3    testosterone cypionate (DEPOTESTOTERONE CYPIONATE) 200 mg/mL injection Inject 200 mg into the muscle every 30 days.        No current facility-administered medications on file prior to visit.       Family History   Problem Relation Age of Onset    Heart disease Mother     Heart disease Father     Heart disease Sister     Heart disease Brother        ROS:  GENERAL: No fever, chills, or weight loss. C/o persistent fatigue and feeling down.  SKIN: No rashes, itching or changes in color or texture of skin.  HEAD: No headaches or recent head trauma.  EYES: Visual acuity fine. No photophobia, ocular pain or diplopia.  EARS: Denies ear pain, discharge or vertigo.  NOSE: No loss of smell, no epistaxis or postnasal drip.  MOUTH & THROAT: No hoarseness or change in voice. No excessive gum bleeding.  NODES: Denies swollen glands.  CHEST: Denies SANON, cyanosis, wheezing, cough and sputum production.  CARDIOVASCULAR: Denies chest pain, PND, orthopnea or reduced exercise tolerance. C/o dizziness.  ABDOMEN: Appetite fine. No weight loss. Denies diarrhea, abdominal pain, hematemesis or blood in stool.  URINARY: No flank pain, dysuria or hematuria.  PERIPHERAL VASCULAR: No claudication or cyanosis.  MUSCULOSKELETAL: No joint stiffness or swelling. Denies back pain.  NEUROLOGIC: No history of seizures, paralysis,  "alteration of gait or coordination.    PE:   BP (!) 164/70   Pulse 64   Temp 98 °F (36.7 °C) (Oral)   Resp 18   Ht 5' 8" (1.727 m)   Wt 75.7 kg (166 lb 14.2 oz)   SpO2 96%   BMI 25.38 kg/m²     APPEARANCE: Well nourished, well developed, in no acute distress.   SKIN: Warm, dry. Large ecchymotic area dorsal surface right hand.   HEENT: Normocephalic, atraumatic. No pharyngeal erythema or exudate. No stridor.   NECK: Supple. NO adenopathy or thyromegaly.   CHEST: Lungs clear to auscultation. No adventitious sounds.   CARDIOVASCULAR: Normal S1, S2. No rubs, murmurs or gallops. Brisk carotid pulses without bruits.   ABDOMEN: Bowel sounds normal. Not distended. Soft. No tenderness or masses.  PERIPHERAL VASCULAR: Femoral pulses present and symmetrical. No edema.  MUSCULOSKELETAL: Unremarkable. There is no joint erythema or warmth noted and there is normal range of motion of all major joints.  BACK: No CVA tenderness. There is no spasm, tenderness or radiculopathy noted with palpation and there is full range of motion.   NEUROLOGIC: Cranial Nerves: II-XII grossly intact.  Motor: 5/5 strength major flexors/extensors. No tremor.  Gait & Posture: Normal gait and fine motion. No cerebellar signs.  Psych: mood euthymic; insight and judgement normal    Results for orders placed or performed in visit on 11/17/21   POCT URINE DIPSTICK WITHOUT MICROSCOPE   Result Value Ref Range    Color, UA Yellow     pH, UA 6.5     WBC, UA neg     Nitrite, UA neg     Protein, UA neg     Glucose, UA neg     Ketones, UA neg     Urobilinogen, UA 0.2     Bilirubin, UA neg     Blood, UA trace     Clarity, UA Clear     Spec Grav UA 1.020    POCT Bladder Scan   Result Value Ref Range    POC Residual Urine Volume 13 0 - 100 mL           Depression, unspecified depression type    improved  Continue wellbutrin  Continue other present meds  Counselled on regular exercise, maintenance of a healthy weight, balanced diet rich in fruits/vegetables, " avoidance of unhealthy habits.  Continue present testosterone replacement and endocrinology follow-up  F/u 6 month to reasses

## 2022-02-22 ENCOUNTER — PATIENT OUTREACH (OUTPATIENT)
Dept: ADMINISTRATIVE | Facility: OTHER | Age: 77
End: 2022-02-22
Payer: MEDICARE

## 2022-02-22 NOTE — PROGRESS NOTES
Health Maintenance Due   Topic Date Due    Hepatitis C Screening  Never done    Shingles Vaccine (2 of 3) 05/01/2014     Updates were requested from care everywhere.  Chart was reviewed for overdue Proactive [facility] Encounters ([facility]) topics (CRS, Breast Cancer Screening, Eye exam)  Health Maintenance has been updated.  LINKS immunization registry triggered.  Immunizations were reconciled.

## 2022-04-07 ENCOUNTER — DOCUMENTATION ONLY (OUTPATIENT)
Dept: FAMILY MEDICINE | Facility: CLINIC | Age: 77
End: 2022-04-07
Payer: MEDICARE

## 2022-04-19 ENCOUNTER — CLINICAL SUPPORT (OUTPATIENT)
Dept: CARDIOLOGY | Facility: HOSPITAL | Age: 77
End: 2022-04-19
Payer: MEDICARE

## 2022-04-19 DIAGNOSIS — Z95.0 PRESENCE OF CARDIAC PACEMAKER: ICD-10-CM

## 2022-04-19 PROCEDURE — 93296 REM INTERROG EVL PM/IDS: CPT | Mod: PO | Performed by: INTERNAL MEDICINE

## 2022-04-21 DIAGNOSIS — N52.9 ERECTILE DYSFUNCTION, UNSPECIFIED ERECTILE DYSFUNCTION TYPE: ICD-10-CM

## 2022-04-21 NOTE — TELEPHONE ENCOUNTER
"----- Message from Elizabeth Segovia sent at 4/21/2022 10:25 AM CDT -----  Regarding: Medication  Refill  Request          Name and Strength:   alprostadiL (EDEX) 20 mcg injection    How is the patient currently taking: Sig - Route: 20 mcg by Intracavitary route as needed for Erectile Dysfunction. use no more than 3 times per week - Intracavitary    Dispense:  5 vial    Preferred Pharmacy with phone number:     [facility] [address]     Phone: [phone]  Fax:  [phone]    Local Order:    Yes     Ordering Provider:   Salvatore - Urology Ordering/Authorizing: Manny Eddy MD     Would the patient rather a call back or a response via [facility]?  No     Preferred Call Back:  [phone] (m)       Additional  Notes:   Patient states, "he's completely out of medication ."       "

## 2022-04-26 ENCOUNTER — LAB VISIT (OUTPATIENT)
Dept: LAB | Facility: HOSPITAL | Age: 77
End: 2022-04-26
Attending: INTERNAL MEDICINE
Payer: MEDICARE

## 2022-04-26 DIAGNOSIS — E53.8 BIOTIN-(PROPIONYL-COA-CARBOXYLASE) LIGASE DEFICIENCY: Primary | ICD-10-CM

## 2022-04-26 DIAGNOSIS — E78.5 HYPERLIPEMIA: ICD-10-CM

## 2022-04-26 DIAGNOSIS — Z12.5 SPECIAL SCREENING FOR MALIGNANT NEOPLASM OF PROSTATE: ICD-10-CM

## 2022-04-26 DIAGNOSIS — E29.1 3-OXO-5 ALPHA-STEROID DELTA 4-DEHYDROGENASE DEFICIENCY: ICD-10-CM

## 2022-04-26 DIAGNOSIS — E29.1 MALE HYPOGONADISM: ICD-10-CM

## 2022-04-26 DIAGNOSIS — R97.20 ABNORMAL PSA: ICD-10-CM

## 2022-04-26 DIAGNOSIS — R53.81 DEBILITY: ICD-10-CM

## 2022-04-26 DIAGNOSIS — E55.9 AVITAMINOSIS D: ICD-10-CM

## 2022-04-26 PROCEDURE — 84403 ASSAY OF TOTAL TESTOSTERONE: CPT | Performed by: INTERNAL MEDICINE

## 2022-04-26 PROCEDURE — 80061 LIPID PANEL: CPT | Performed by: INTERNAL MEDICINE

## 2022-04-26 PROCEDURE — 84443 ASSAY THYROID STIM HORMONE: CPT | Performed by: INTERNAL MEDICINE

## 2022-04-26 PROCEDURE — 84439 ASSAY OF FREE THYROXINE: CPT | Performed by: INTERNAL MEDICINE

## 2022-04-26 PROCEDURE — 84402 ASSAY OF FREE TESTOSTERONE: CPT | Performed by: INTERNAL MEDICINE

## 2022-04-26 PROCEDURE — 83002 ASSAY OF GONADOTROPIN (LH): CPT | Performed by: INTERNAL MEDICINE

## 2022-04-26 PROCEDURE — 36415 COLL VENOUS BLD VENIPUNCTURE: CPT | Mod: PO | Performed by: INTERNAL MEDICINE

## 2022-04-26 PROCEDURE — 80053 COMPREHEN METABOLIC PANEL: CPT | Performed by: INTERNAL MEDICINE

## 2022-04-26 PROCEDURE — 84153 ASSAY OF PSA TOTAL: CPT | Performed by: INTERNAL MEDICINE

## 2022-04-26 PROCEDURE — 85025 COMPLETE CBC W/AUTO DIFF WBC: CPT | Performed by: INTERNAL MEDICINE

## 2022-04-26 PROCEDURE — 82550 ASSAY OF CK (CPK): CPT | Performed by: INTERNAL MEDICINE

## 2022-04-27 LAB
ALBUMIN SERPL BCP-MCNC: 3.6 G/DL (ref 3.5–5.2)
ALP SERPL-CCNC: 66 U/L (ref 55–135)
ALT SERPL W/O P-5'-P-CCNC: 30 U/L (ref 10–44)
ANION GAP SERPL CALC-SCNC: 10 MMOL/L (ref 8–16)
AST SERPL-CCNC: 44 U/L (ref 10–40)
BASOPHILS # BLD AUTO: 0.06 K/UL (ref 0–0.2)
BASOPHILS NFR BLD: 0.7 % (ref 0–1.9)
BILIRUB SERPL-MCNC: 0.5 MG/DL (ref 0.1–1)
BUN SERPL-MCNC: 17 MG/DL (ref 8–23)
CALCIUM SERPL-MCNC: 9.5 MG/DL (ref 8.7–10.5)
CHLORIDE SERPL-SCNC: 98 MMOL/L (ref 95–110)
CHOLEST SERPL-MCNC: 116 MG/DL (ref 120–199)
CHOLEST/HDLC SERPL: 2.6 {RATIO} (ref 2–5)
CK SERPL-CCNC: 581 U/L (ref 20–200)
CO2 SERPL-SCNC: 27 MMOL/L (ref 23–29)
COMPLEXED PSA SERPL-MCNC: 0.95 NG/ML (ref 0–4)
CREAT SERPL-MCNC: 1.4 MG/DL (ref 0.5–1.4)
DIFFERENTIAL METHOD: ABNORMAL
EOSINOPHIL # BLD AUTO: 0.1 K/UL (ref 0–0.5)
EOSINOPHIL NFR BLD: 0.7 % (ref 0–8)
ERYTHROCYTE [DISTWIDTH] IN BLOOD BY AUTOMATED COUNT: 13.7 % (ref 11.5–14.5)
EST. GFR  (AFRICAN AMERICAN): 54.9 ML/MIN/1.73 M^2
EST. GFR  (NON AFRICAN AMERICAN): 47.4 ML/MIN/1.73 M^2
GLUCOSE SERPL-MCNC: 74 MG/DL (ref 70–110)
HCT VFR BLD AUTO: 50.7 % (ref 40–54)
HDLC SERPL-MCNC: 44 MG/DL (ref 40–75)
HDLC SERPL: 37.9 % (ref 20–50)
HGB BLD-MCNC: 16.5 G/DL (ref 14–18)
IMM GRANULOCYTES # BLD AUTO: 0.05 K/UL (ref 0–0.04)
IMM GRANULOCYTES NFR BLD AUTO: 0.6 % (ref 0–0.5)
LDLC SERPL CALC-MCNC: 64.8 MG/DL (ref 63–159)
LH SERPL-ACNC: <0.2 MIU/ML (ref 0.6–12.1)
LYMPHOCYTES # BLD AUTO: 1.8 K/UL (ref 1–4.8)
LYMPHOCYTES NFR BLD: 21.1 % (ref 18–48)
MCH RBC QN AUTO: 33.1 PG (ref 27–31)
MCHC RBC AUTO-ENTMCNC: 32.5 G/DL (ref 32–36)
MCV RBC AUTO: 102 FL (ref 82–98)
MONOCYTES # BLD AUTO: 1.1 K/UL (ref 0.3–1)
MONOCYTES NFR BLD: 12.8 % (ref 4–15)
NEUTROPHILS # BLD AUTO: 5.6 K/UL (ref 1.8–7.7)
NEUTROPHILS NFR BLD: 64.1 % (ref 38–73)
NONHDLC SERPL-MCNC: 72 MG/DL
NRBC BLD-RTO: 0 /100 WBC
PLATELET # BLD AUTO: 290 K/UL (ref 150–450)
PMV BLD AUTO: 10.9 FL (ref 9.2–12.9)
POTASSIUM SERPL-SCNC: 4.8 MMOL/L (ref 3.5–5.1)
PROT SERPL-MCNC: 7.1 G/DL (ref 6–8.4)
RBC # BLD AUTO: 4.99 M/UL (ref 4.6–6.2)
SODIUM SERPL-SCNC: 135 MMOL/L (ref 136–145)
T4 FREE SERPL-MCNC: 0.86 NG/DL (ref 0.71–1.51)
TESTOST SERPL-MCNC: >1500 NG/DL (ref 304–1227)
TRIGL SERPL-MCNC: 36 MG/DL (ref 30–150)
TSH SERPL DL<=0.005 MIU/L-ACNC: 2.73 UIU/ML (ref 0.4–4)
WBC # BLD AUTO: 8.73 K/UL (ref 3.9–12.7)

## 2022-05-09 ENCOUNTER — PATIENT MESSAGE (OUTPATIENT)
Dept: SMOKING CESSATION | Facility: CLINIC | Age: 77
End: 2022-05-09
Payer: MEDICARE

## 2022-05-23 LAB — TESTOST FREE SERPL-MCNC: NORMAL PG/ML (ref 47–244)

## 2022-06-29 DIAGNOSIS — N52.9 ERECTILE DYSFUNCTION, UNSPECIFIED ERECTILE DYSFUNCTION TYPE: ICD-10-CM

## 2022-07-18 ENCOUNTER — CLINICAL SUPPORT (OUTPATIENT)
Dept: CARDIOLOGY | Facility: HOSPITAL | Age: 77
End: 2022-07-18
Payer: MEDICARE

## 2022-07-18 DIAGNOSIS — Z95.0 PRESENCE OF CARDIAC PACEMAKER: ICD-10-CM

## 2022-07-18 PROCEDURE — 93294 REM INTERROG EVL PM/LDLS PM: CPT | Mod: ,,, | Performed by: INTERNAL MEDICINE

## 2022-07-18 PROCEDURE — 93294 CARDIAC DEVICE CHECK - REMOTE: ICD-10-PCS | Mod: ,,, | Performed by: INTERNAL MEDICINE

## 2022-07-18 PROCEDURE — 93296 REM INTERROG EVL PM/IDS: CPT | Mod: PO | Performed by: INTERNAL MEDICINE

## 2022-08-01 ENCOUNTER — OFFICE VISIT (OUTPATIENT)
Dept: FAMILY MEDICINE | Facility: CLINIC | Age: 77
End: 2022-08-01
Payer: MEDICARE

## 2022-08-01 VITALS
SYSTOLIC BLOOD PRESSURE: 130 MMHG | WEIGHT: 164.69 LBS | HEIGHT: 68 IN | OXYGEN SATURATION: 97 % | TEMPERATURE: 98 F | DIASTOLIC BLOOD PRESSURE: 80 MMHG | HEART RATE: 75 BPM | BODY MASS INDEX: 24.96 KG/M2

## 2022-08-01 DIAGNOSIS — I49.5 SSS (SICK SINUS SYNDROME): ICD-10-CM

## 2022-08-01 DIAGNOSIS — I25.10 CORONARY ARTERY DISEASE INVOLVING NATIVE CORONARY ARTERY OF NATIVE HEART WITHOUT ANGINA PECTORIS: ICD-10-CM

## 2022-08-01 DIAGNOSIS — F32.A DEPRESSION, UNSPECIFIED DEPRESSION TYPE: Primary | ICD-10-CM

## 2022-08-01 PROCEDURE — 99499 RISK ADDL DX/OHS AUDIT: ICD-10-PCS | Mod: S$GLB,,, | Performed by: FAMILY MEDICINE

## 2022-08-01 PROCEDURE — 3075F SYST BP GE 130 - 139MM HG: CPT | Mod: CPTII,S$GLB,, | Performed by: FAMILY MEDICINE

## 2022-08-01 PROCEDURE — 1126F AMNT PAIN NOTED NONE PRSNT: CPT | Mod: CPTII,S$GLB,, | Performed by: FAMILY MEDICINE

## 2022-08-01 PROCEDURE — 1159F PR MEDICATION LIST DOCUMENTED IN MEDICAL RECORD: ICD-10-PCS | Mod: CPTII,S$GLB,, | Performed by: FAMILY MEDICINE

## 2022-08-01 PROCEDURE — 3075F PR MOST RECENT SYSTOLIC BLOOD PRESS GE 130-139MM HG: ICD-10-PCS | Mod: CPTII,S$GLB,, | Performed by: FAMILY MEDICINE

## 2022-08-01 PROCEDURE — 1159F MED LIST DOCD IN RCRD: CPT | Mod: CPTII,S$GLB,, | Performed by: FAMILY MEDICINE

## 2022-08-01 PROCEDURE — 99214 PR OFFICE/OUTPT VISIT, EST, LEVL IV, 30-39 MIN: ICD-10-PCS | Mod: S$GLB,,, | Performed by: FAMILY MEDICINE

## 2022-08-01 PROCEDURE — 1126F PR PAIN SEVERITY QUANTIFIED, NO PAIN PRESENT: ICD-10-PCS | Mod: CPTII,S$GLB,, | Performed by: FAMILY MEDICINE

## 2022-08-01 PROCEDURE — 99999 PR PBB SHADOW E&M-EST. PATIENT-LVL III: ICD-10-PCS | Mod: PBBFAC,,, | Performed by: FAMILY MEDICINE

## 2022-08-01 PROCEDURE — 3079F DIAST BP 80-89 MM HG: CPT | Mod: CPTII,S$GLB,, | Performed by: FAMILY MEDICINE

## 2022-08-01 PROCEDURE — 99999 PR PBB SHADOW E&M-EST. PATIENT-LVL III: CPT | Mod: PBBFAC,,, | Performed by: FAMILY MEDICINE

## 2022-08-01 PROCEDURE — 3079F PR MOST RECENT DIASTOLIC BLOOD PRESSURE 80-89 MM HG: ICD-10-PCS | Mod: CPTII,S$GLB,, | Performed by: FAMILY MEDICINE

## 2022-08-01 PROCEDURE — 99214 OFFICE O/P EST MOD 30 MIN: CPT | Mod: S$GLB,,, | Performed by: FAMILY MEDICINE

## 2022-08-01 PROCEDURE — 99499 UNLISTED E&M SERVICE: CPT | Mod: S$GLB,,, | Performed by: FAMILY MEDICINE

## 2022-08-01 RX ORDER — BUPROPION HYDROCHLORIDE 300 MG/1
300 TABLET ORAL DAILY
Qty: 90 TABLET | Refills: 3 | Status: SHIPPED | OUTPATIENT
Start: 2022-08-01 | End: 2023-08-27

## 2022-08-01 NOTE — PROGRESS NOTES
Chief Complaint   Patient presents with    Follow-up     6 month       77 year old male patient presents for 6 month f/u on Wellbutrin    He feels like he has improved with this.  Some improved concentration, focus, decreased irritability  Denies SE on medication  He would like to continue this and would like to increase the dose.  Initially c/o depression, apathy, depression, irritable, frustrations and trouble concentrating.    Getting testosterone injections twice monthly and following with Dr. Barrientos.   CAD s/p stents,SSS - following with Dr. Rothman, tolerating Pravachol 40mg daily  Hypogonadism - following with Dr. Barrientos  He exercises regularly 2-3 weeks with weights.      Past Medical History:   Diagnosis Date    Basal cell carcinoma     CAD (coronary artery disease)     Hypertension        Past Surgical History:   Procedure Laterality Date    APPENDECTOMY      BASAL CELL CARCINOMA EXCISION      left forehead    CARDIAC PACEMAKER PLACEMENT      CIRCUMCISION      CORONARY STENT PLACEMENT      4 stents in RCA    inguinal hernia      LUMBAR DISC SURGERY  1998       Allergies   Allergen Reactions    Hydrocodone Nausea Only       Social History     Socioeconomic History    Marital status:     Number of children: 3   Occupational History    Occupation: retired refinery     Occupation: 8,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 use no  more than 3 times per week 5 each 12    coenzyme Q10 100 mg capsule Take 100 mg by mouth once daily.      fish oil-omega-3 fatty acids 300-1,000 mg capsule Take by mouth once daily.      multivitamin capsule Take 1 capsule by mouth once daily.      oxybutynin (DITROPAN-XL) 10 MG 24 hr tablet Take 1 tablet (10 mg total) by mouth once daily. 30 tablet 11    prasterone, dhea, 50 mg Tab Take by mouth once daily.      pravastatin (PRAVACHOL) 40 MG tablet TAKE 1 TABLET BY MOUTH DAILY 90 tablet 3    testosterone cypionate (DEPOTESTOTERONE CYPIONATE) 200 mg/mL injection Inject 200 mg into the muscle every 30 days.       [DISCONTINUED] buPROPion (WELLBUTRIN XL) 150 MG TB24 tablet Take 1 tablet (150 mg total) by mouth once daily. 90 tablet 3     No current facility-administered medications on file prior to visit.       Family History   Problem Relation Age of Onset    Heart disease Mother     Heart disease Father     Heart disease Sister     Heart disease Brother        ROS:  GENERAL: No fever, chills, or weight loss. C/o persistent fatigue and feeling down.  SKIN: No rashes, itching or changes in color or texture of skin.  HEAD: No headaches or recent head trauma.  EYES: Visual acuity fine. No photophobia, ocular pain or diplopia.  EARS: Denies ear pain, discharge or vertigo.  NOSE: No loss of smell, no epistaxis or postnasal drip.  MOUTH & THROAT: No hoarseness or change in voice. No excessive gum bleeding.  NODES: Denies swollen glands.  CHEST: Denies SANON, cyanosis, wheezing, cough and sputum production.  CARDIOVASCULAR: Denies chest pain, PND, orthopnea or reduced exercise tolerance. C/o dizziness.  ABDOMEN: Appetite fine. No weight loss. Denies diarrhea, abdominal pain, hematemesis or blood in stool.  URINARY: No flank pain, dysuria or hematuria.  PERIPHERAL VASCULAR: No claudication or cyanosis.  MUSCULOSKELETAL: No joint stiffness or swelling. Denies back pain.  NEUROLOGIC: No history of seizures,  "paralysis, alteration of gait or coordination.    PE:   /80   Pulse 75   Temp 98 °F (36.7 °C)   Ht 5' 8" (1.727 m)   Wt 74.7 kg (164 lb 10.9 oz)   SpO2 97%   BMI 25.04 kg/m²     APPEARANCE: Well nourished, well developed, in no acute distress.   SKIN: Warm, dry. Large ecchymotic area dorsal surface right hand.   HEENT: Normocephalic, atraumatic. No pharyngeal erythema or exudate. No stridor.   NECK: Supple. NO adenopathy or thyromegaly.   CHEST: Lungs clear to auscultation. No adventitious sounds.   CARDIOVASCULAR: Normal S1, S2. No rubs, murmurs or gallops. Brisk carotid pulses without bruits.   ABDOMEN: Bowel sounds normal. Not distended. Soft. No tenderness or masses.  PERIPHERAL VASCULAR: Femoral pulses present and symmetrical. No edema.  MUSCULOSKELETAL: Unremarkable. There is no joint erythema or warmth noted and there is normal range of motion of all major joints.  BACK: No CVA tenderness. There is no spasm, tenderness or radiculopathy noted with palpation and there is full range of motion.   NEUROLOGIC: Cranial Nerves: II-XII grossly intact.  Motor: 5/5 strength major flexors/extensors. No tremor.  Gait & Posture: Normal gait and fine motion. No cerebellar signs.  Psych: mood euthymic; insight and judgement normal    Results for orders placed or performed in visit on 04/26/22   CBC Auto Differential   Result Value Ref Range    WBC 8.73 3.90 - 12.70 K/uL    RBC 4.99 4.60 - 6.20 M/uL    Hemoglobin 16.5 14.0 - 18.0 g/dL    Hematocrit 50.7 40.0 - 54.0 %     (H) 82 - 98 fL    MCH 33.1 (H) 27.0 - 31.0 pg    MCHC 32.5 32.0 - 36.0 g/dL    RDW 13.7 11.5 - 14.5 %    Platelets 290 150 - 450 K/uL    MPV 10.9 9.2 - 12.9 fL    Immature Granulocytes 0.6 (H) 0.0 - 0.5 %    Gran # (ANC) 5.6 1.8 - 7.7 K/uL    Immature Grans (Abs) 0.05 (H) 0.00 - 0.04 K/uL    Lymph # 1.8 1.0 - 4.8 K/uL    Mono # 1.1 (H) 0.3 - 1.0 K/uL    Eos # 0.1 0.0 - 0.5 K/uL    Baso # 0.06 0.00 - 0.20 K/uL    nRBC 0 0 /100 WBC    Gran % " 64.1 38.0 - 73.0 %    Lymph % 21.1 18.0 - 48.0 %    Mono % 12.8 4.0 - 15.0 %    Eosinophil % 0.7 0.0 - 8.0 %    Basophil % 0.7 0.0 - 1.9 %    Differential Method Automated    Comprehensive Metabolic Panel   Result Value Ref Range    Sodium 135 (L) 136 - 145 mmol/L    Potassium 4.8 3.5 - 5.1 mmol/L    Chloride 98 95 - 110 mmol/L    CO2 27 23 - 29 mmol/L    Glucose 74 70 - 110 mg/dL    BUN 17 8 - 23 mg/dL    Creatinine 1.4 0.5 - 1.4 mg/dL    Calcium 9.5 8.7 - 10.5 mg/dL    Total Protein 7.1 6.0 - 8.4 g/dL    Albumin 3.6 3.5 - 5.2 g/dL    Total Bilirubin 0.5 0.1 - 1.0 mg/dL    Alkaline Phosphatase 66 55 - 135 U/L    AST 44 (H) 10 - 40 U/L    ALT 30 10 - 44 U/L    Anion Gap 10 8 - 16 mmol/L    eGFR if African American 54.9 (A) >60 mL/min/1.73 m^2    eGFR if non  47.4 (A) >60 mL/min/1.73 m^2   CK   Result Value Ref Range     (H) 20 - 200 U/L   LIPID PANEL   Result Value Ref Range    Cholesterol 116 (L) 120 - 199 mg/dL    Triglycerides 36 30 - 150 mg/dL    HDL 44 40 - 75 mg/dL    LDL Cholesterol 64.8 63.0 - 159.0 mg/dL    HDL/Cholesterol Ratio 37.9 20.0 - 50.0 %    Total Cholesterol/HDL Ratio 2.6 2.0 - 5.0    Non-HDL Cholesterol 72 mg/dL   LUTEINIZING HORMONE   Result Value Ref Range    LH <0.2 (L) 0.6 - 12.1 mIU/mL   TSH   Result Value Ref Range    TSH 2.734 0.400 - 4.000 uIU/mL   T4, FREE   Result Value Ref Range    Free T4 0.86 0.71 - 1.51 ng/dL   Testosterone   Result Value Ref Range    Testosterone, Total >1500 (H) 304 - 1227 ng/dL   Testosterone, free   Result Value Ref Range    Testosterone, Free See Note 47 - 244 pg/mL   PROSTATE SPECIFIC ANTIGEN, DIAGNOSTIC   Result Value Ref Range    PSA Diagnostic 0.95 0.00 - 4.00 ng/mL           Depression, unspecified depression type  -     buPROPion (WELLBUTRIN XL) 300 MG 24 hr tablet; Take 1 tablet (300 mg total) by mouth once daily.  Dispense: 90 tablet; Refill: 3    SSS (sick sinus syndrome)    Coronary artery disease involving native coronary  artery of native heart without angina pectoris       stable  Increase to wellbutrin 300mg  Continue other present meds  Counselled on regular exercise, maintenance of a healthy weight, balanced diet rich in fruits/vegetables, avoidance of unhealthy habits.  Continue present testosterone replacement and endocrinology follow-up  F/u 6 months

## 2022-09-26 ENCOUNTER — TELEPHONE (OUTPATIENT)
Dept: CARDIOLOGY | Facility: HOSPITAL | Age: 77
End: 2022-09-26
Payer: MEDICARE

## 2022-10-16 ENCOUNTER — CLINICAL SUPPORT (OUTPATIENT)
Dept: CARDIOLOGY | Facility: HOSPITAL | Age: 77
End: 2022-10-16
Payer: MEDICARE

## 2022-10-16 DIAGNOSIS — Z95.0 PRESENCE OF CARDIAC PACEMAKER: ICD-10-CM

## 2022-10-16 PROCEDURE — 93296 REM INTERROG EVL PM/IDS: CPT | Mod: PO | Performed by: INTERNAL MEDICINE

## 2022-10-20 ENCOUNTER — PATIENT MESSAGE (OUTPATIENT)
Dept: CARDIOLOGY | Facility: HOSPITAL | Age: 77
End: 2022-10-20
Payer: MEDICARE

## 2022-10-20 ENCOUNTER — TELEPHONE (OUTPATIENT)
Dept: CARDIOLOGY | Facility: HOSPITAL | Age: 77
End: 2022-10-20
Payer: MEDICARE

## 2022-10-31 ENCOUNTER — OFFICE VISIT (OUTPATIENT)
Dept: CARDIOLOGY | Facility: CLINIC | Age: 77
End: 2022-10-31
Payer: MEDICARE

## 2022-10-31 ENCOUNTER — CLINICAL SUPPORT (OUTPATIENT)
Dept: CARDIOLOGY | Facility: HOSPITAL | Age: 77
End: 2022-10-31
Attending: INTERNAL MEDICINE
Payer: MEDICARE

## 2022-10-31 VITALS
WEIGHT: 164.44 LBS | HEART RATE: 73 BPM | BODY MASS INDEX: 24.92 KG/M2 | HEIGHT: 68 IN | DIASTOLIC BLOOD PRESSURE: 86 MMHG | SYSTOLIC BLOOD PRESSURE: 193 MMHG

## 2022-10-31 DIAGNOSIS — I49.5 SSS (SICK SINUS SYNDROME): ICD-10-CM

## 2022-10-31 DIAGNOSIS — I10 PRIMARY HYPERTENSION: ICD-10-CM

## 2022-10-31 DIAGNOSIS — R06.02 SOB (SHORTNESS OF BREATH) ON EXERTION: ICD-10-CM

## 2022-10-31 DIAGNOSIS — Z95.0 PACEMAKER: ICD-10-CM

## 2022-10-31 DIAGNOSIS — I49.5 SSS (SICK SINUS SYNDROME): Primary | ICD-10-CM

## 2022-10-31 DIAGNOSIS — Z95.5 S/P CORONARY ARTERY STENT PLACEMENT: ICD-10-CM

## 2022-10-31 PROCEDURE — 3077F SYST BP >= 140 MM HG: CPT | Mod: CPTII,S$GLB,, | Performed by: INTERNAL MEDICINE

## 2022-10-31 PROCEDURE — 93280 CARDIAC DEVICE CHECK - IN CLINIC & HOSPITAL: ICD-10-PCS | Mod: 26,,, | Performed by: INTERNAL MEDICINE

## 2022-10-31 PROCEDURE — 3077F PR MOST RECENT SYSTOLIC BLOOD PRESSURE >= 140 MM HG: ICD-10-PCS | Mod: CPTII,S$GLB,, | Performed by: INTERNAL MEDICINE

## 2022-10-31 PROCEDURE — 93280 PM DEVICE PROGR EVAL DUAL: CPT | Mod: 26,,, | Performed by: INTERNAL MEDICINE

## 2022-10-31 PROCEDURE — 99999 PR PBB SHADOW E&M-EST. PATIENT-LVL III: CPT | Mod: PBBFAC,,, | Performed by: INTERNAL MEDICINE

## 2022-10-31 PROCEDURE — 1126F PR PAIN SEVERITY QUANTIFIED, NO PAIN PRESENT: ICD-10-PCS | Mod: CPTII,S$GLB,, | Performed by: INTERNAL MEDICINE

## 2022-10-31 PROCEDURE — 1160F PR REVIEW ALL MEDS BY PRESCRIBER/CLIN PHARMACIST DOCUMENTED: ICD-10-PCS | Mod: CPTII,S$GLB,, | Performed by: INTERNAL MEDICINE

## 2022-10-31 PROCEDURE — 1126F AMNT PAIN NOTED NONE PRSNT: CPT | Mod: CPTII,S$GLB,, | Performed by: INTERNAL MEDICINE

## 2022-10-31 PROCEDURE — 3079F DIAST BP 80-89 MM HG: CPT | Mod: CPTII,S$GLB,, | Performed by: INTERNAL MEDICINE

## 2022-10-31 PROCEDURE — 99214 PR OFFICE/OUTPT VISIT, EST, LEVL IV, 30-39 MIN: ICD-10-PCS | Mod: S$GLB,,, | Performed by: INTERNAL MEDICINE

## 2022-10-31 PROCEDURE — 1159F MED LIST DOCD IN RCRD: CPT | Mod: CPTII,S$GLB,, | Performed by: INTERNAL MEDICINE

## 2022-10-31 PROCEDURE — 1159F PR MEDICATION LIST DOCUMENTED IN MEDICAL RECORD: ICD-10-PCS | Mod: CPTII,S$GLB,, | Performed by: INTERNAL MEDICINE

## 2022-10-31 PROCEDURE — 99214 OFFICE O/P EST MOD 30 MIN: CPT | Mod: S$GLB,,, | Performed by: INTERNAL MEDICINE

## 2022-10-31 PROCEDURE — 3079F PR MOST RECENT DIASTOLIC BLOOD PRESSURE 80-89 MM HG: ICD-10-PCS | Mod: CPTII,S$GLB,, | Performed by: INTERNAL MEDICINE

## 2022-10-31 PROCEDURE — 1160F RVW MEDS BY RX/DR IN RCRD: CPT | Mod: CPTII,S$GLB,, | Performed by: INTERNAL MEDICINE

## 2022-10-31 PROCEDURE — 99999 PR PBB SHADOW E&M-EST. PATIENT-LVL III: ICD-10-PCS | Mod: PBBFAC,,, | Performed by: INTERNAL MEDICINE

## 2022-10-31 RX ORDER — AMLODIPINE BESYLATE 5 MG/1
5 TABLET ORAL DAILY
Qty: 30 TABLET | Refills: 11 | Status: SHIPPED | OUTPATIENT
Start: 2022-10-31 | End: 2023-10-04 | Stop reason: SDUPTHER

## 2022-10-31 NOTE — PROGRESS NOTES
Subjective:    Patient ID:  José Miguel Austin is a 77 y.o. male who presents for follow-up of Coronary Artery Disease (Annual f/u w/ device check )      HPI  He comes for follow up with no major problems, no chest pain, no shortness of breath.  Extreme tiredness and fatigue are main complaints  BP elevated at home    Review of Systems   Constitutional: Negative for decreased appetite, malaise/fatigue, weight gain and weight loss.   Cardiovascular:  Negative for chest pain, dyspnea on exertion, leg swelling, palpitations and syncope.   Respiratory:  Negative for cough and shortness of breath.    Gastrointestinal: Negative.    Neurological:  Negative for weakness.   All other systems reviewed and are negative.     Objective:      Physical Exam  Vitals and nursing note reviewed.   Constitutional:       Appearance: Normal appearance. He is well-developed.   HENT:      Head: Normocephalic.   Eyes:      Pupils: Pupils are equal, round, and reactive to light.   Neck:      Thyroid: No thyromegaly.      Vascular: No carotid bruit or JVD.   Cardiovascular:      Rate and Rhythm: Normal rate and regular rhythm.      Chest Wall: PMI is not displaced.      Pulses: Normal pulses and intact distal pulses.      Heart sounds: Normal heart sounds. No murmur heard.    No gallop.   Pulmonary:      Effort: Pulmonary effort is normal.      Breath sounds: Normal breath sounds.   Abdominal:      Palpations: Abdomen is soft. There is no mass.      Tenderness: There is no abdominal tenderness.   Musculoskeletal:         General: Normal range of motion.      Cervical back: Normal range of motion and neck supple.   Skin:     General: Skin is warm.   Neurological:      Mental Status: He is alert and oriented to person, place, and time.      Sensory: No sensory deficit.      Deep Tendon Reflexes: Reflexes are normal and symmetric.       Assessment:       1. SSS (sick sinus syndrome)    2. SOB (shortness of breath) on exertion    3. S/P coronary  artery stent placement    4. Pacemaker    5. Primary hypertension         Plan:   Amlodipine 5 qd  Continue all cardiac medications  Regular exercise program  3 m f/u

## 2022-11-01 ENCOUNTER — LAB VISIT (OUTPATIENT)
Dept: LAB | Facility: HOSPITAL | Age: 77
End: 2022-11-01
Attending: INTERNAL MEDICINE
Payer: MEDICARE

## 2022-11-01 DIAGNOSIS — E29.1 MALE HYPOGONADISM: ICD-10-CM

## 2022-11-01 DIAGNOSIS — Z12.5 SPECIAL SCREENING FOR MALIGNANT NEOPLASM OF PROSTATE: ICD-10-CM

## 2022-11-01 DIAGNOSIS — E55.9 VITAMIN D DEFICIENCY: ICD-10-CM

## 2022-11-01 DIAGNOSIS — R53.81 DEBILITY: ICD-10-CM

## 2022-11-01 DIAGNOSIS — M79.10 MYALGIA: ICD-10-CM

## 2022-11-01 DIAGNOSIS — E53.8 BIOTIN-(PROPIONYL-COA-CARBOXYLASE) LIGASE DEFICIENCY: Primary | ICD-10-CM

## 2022-11-01 LAB
ALBUMIN SERPL BCP-MCNC: 3.7 G/DL (ref 3.5–5.2)
ALP SERPL-CCNC: 86 U/L (ref 55–135)
ALT SERPL W/O P-5'-P-CCNC: 33 U/L (ref 10–44)
ANION GAP SERPL CALC-SCNC: 11 MMOL/L (ref 8–16)
AST SERPL-CCNC: 37 U/L (ref 10–40)
BASOPHILS # BLD AUTO: 0.07 K/UL (ref 0–0.2)
BASOPHILS NFR BLD: 0.8 % (ref 0–1.9)
BILIRUB SERPL-MCNC: 0.6 MG/DL (ref 0.1–1)
BUN SERPL-MCNC: 16 MG/DL (ref 8–23)
CALCIUM SERPL-MCNC: 9.1 MG/DL (ref 8.7–10.5)
CHLORIDE SERPL-SCNC: 100 MMOL/L (ref 95–110)
CHOLEST SERPL-MCNC: 155 MG/DL (ref 120–199)
CHOLEST/HDLC SERPL: 3.2 {RATIO} (ref 2–5)
CK SERPL-CCNC: 310 U/L (ref 20–200)
CO2 SERPL-SCNC: 25 MMOL/L (ref 23–29)
COMPLEXED PSA SERPL-MCNC: 0.87 NG/ML (ref 0–4)
CREAT SERPL-MCNC: 1.2 MG/DL (ref 0.5–1.4)
CRP SERPL-MCNC: 2.2 MG/L (ref 0–8.2)
DHEA-S SERPL-MCNC: 43 UG/DL
DIFFERENTIAL METHOD: ABNORMAL
EOSINOPHIL # BLD AUTO: 0.2 K/UL (ref 0–0.5)
EOSINOPHIL NFR BLD: 2.6 % (ref 0–8)
ERYTHROCYTE [DISTWIDTH] IN BLOOD BY AUTOMATED COUNT: 13.8 % (ref 11.5–14.5)
ERYTHROCYTE [SEDIMENTATION RATE] IN BLOOD BY WESTERGREN METHOD: 1 MM/HR (ref 0–10)
EST. GFR  (NO RACE VARIABLE): >60 ML/MIN/1.73 M^2
GLUCOSE SERPL-MCNC: 83 MG/DL (ref 70–110)
HCT VFR BLD AUTO: 50.4 % (ref 40–54)
HDLC SERPL-MCNC: 48 MG/DL (ref 40–75)
HDLC SERPL: 31 % (ref 20–50)
HGB BLD-MCNC: 16.5 G/DL (ref 14–18)
IMM GRANULOCYTES # BLD AUTO: 0.05 K/UL (ref 0–0.04)
IMM GRANULOCYTES NFR BLD AUTO: 0.5 % (ref 0–0.5)
LDLC SERPL CALC-MCNC: 96.2 MG/DL (ref 63–159)
LH SERPL-ACNC: 0.3 MIU/ML (ref 0.6–12.1)
LYMPHOCYTES # BLD AUTO: 2.2 K/UL (ref 1–4.8)
LYMPHOCYTES NFR BLD: 23.5 % (ref 18–48)
MCH RBC QN AUTO: 32.9 PG (ref 27–31)
MCHC RBC AUTO-ENTMCNC: 32.7 G/DL (ref 32–36)
MCV RBC AUTO: 100 FL (ref 82–98)
MONOCYTES # BLD AUTO: 1.1 K/UL (ref 0.3–1)
MONOCYTES NFR BLD: 12.1 % (ref 4–15)
NEUTROPHILS # BLD AUTO: 5.7 K/UL (ref 1.8–7.7)
NEUTROPHILS NFR BLD: 60.5 % (ref 38–73)
NONHDLC SERPL-MCNC: 107 MG/DL
NRBC BLD-RTO: 0 /100 WBC
PLATELET # BLD AUTO: 332 K/UL (ref 150–450)
PMV BLD AUTO: 10.3 FL (ref 9.2–12.9)
POTASSIUM SERPL-SCNC: 4.1 MMOL/L (ref 3.5–5.1)
PROT SERPL-MCNC: 7.3 G/DL (ref 6–8.4)
RBC # BLD AUTO: 5.02 M/UL (ref 4.6–6.2)
SODIUM SERPL-SCNC: 136 MMOL/L (ref 136–145)
T4 FREE SERPL-MCNC: 0.79 NG/DL (ref 0.71–1.51)
TESTOST SERPL-MCNC: >1500 NG/DL (ref 304–1227)
TRIGL SERPL-MCNC: 54 MG/DL (ref 30–150)
TSH SERPL DL<=0.005 MIU/L-ACNC: 3.45 UIU/ML (ref 0.4–4)
WBC # BLD AUTO: 9.33 K/UL (ref 3.9–12.7)

## 2022-11-01 PROCEDURE — 82306 VITAMIN D 25 HYDROXY: CPT | Performed by: INTERNAL MEDICINE

## 2022-11-01 PROCEDURE — 82550 ASSAY OF CK (CPK): CPT | Performed by: INTERNAL MEDICINE

## 2022-11-01 PROCEDURE — 84443 ASSAY THYROID STIM HORMONE: CPT | Performed by: INTERNAL MEDICINE

## 2022-11-01 PROCEDURE — 84153 ASSAY OF PSA TOTAL: CPT | Performed by: INTERNAL MEDICINE

## 2022-11-01 PROCEDURE — 82627 DEHYDROEPIANDROSTERONE: CPT | Performed by: INTERNAL MEDICINE

## 2022-11-01 PROCEDURE — 83002 ASSAY OF GONADOTROPIN (LH): CPT | Performed by: INTERNAL MEDICINE

## 2022-11-01 PROCEDURE — 86038 ANTINUCLEAR ANTIBODIES: CPT | Performed by: INTERNAL MEDICINE

## 2022-11-01 PROCEDURE — 84402 ASSAY OF FREE TESTOSTERONE: CPT | Performed by: INTERNAL MEDICINE

## 2022-11-01 PROCEDURE — 36415 COLL VENOUS BLD VENIPUNCTURE: CPT | Mod: PO | Performed by: INTERNAL MEDICINE

## 2022-11-01 PROCEDURE — 84439 ASSAY OF FREE THYROXINE: CPT | Performed by: INTERNAL MEDICINE

## 2022-11-01 PROCEDURE — 85025 COMPLETE CBC W/AUTO DIFF WBC: CPT | Performed by: INTERNAL MEDICINE

## 2022-11-01 PROCEDURE — 85651 RBC SED RATE NONAUTOMATED: CPT | Mod: PO | Performed by: INTERNAL MEDICINE

## 2022-11-01 PROCEDURE — 86140 C-REACTIVE PROTEIN: CPT | Performed by: INTERNAL MEDICINE

## 2022-11-01 PROCEDURE — 80053 COMPREHEN METABOLIC PANEL: CPT | Performed by: INTERNAL MEDICINE

## 2022-11-01 PROCEDURE — 80061 LIPID PANEL: CPT | Performed by: INTERNAL MEDICINE

## 2022-11-01 PROCEDURE — 84403 ASSAY OF TOTAL TESTOSTERONE: CPT | Performed by: INTERNAL MEDICINE

## 2022-11-01 PROCEDURE — 82085 ASSAY OF ALDOLASE: CPT | Performed by: INTERNAL MEDICINE

## 2022-11-01 PROCEDURE — 84481 FREE ASSAY (FT-3): CPT | Performed by: INTERNAL MEDICINE

## 2022-11-02 LAB
25(OH)D3+25(OH)D2 SERPL-MCNC: 62 NG/ML (ref 30–96)
ALDOLASE SERPL-CCNC: 5.3 U/L (ref 1.2–7.6)
ANA SER QL IF: NORMAL
T3FREE SERPL-MCNC: 3 PG/ML (ref 2.3–4.2)

## 2022-11-04 LAB — TESTOST FREE SERPL-MCNC: 22 PG/ML

## 2022-11-10 ENCOUNTER — TELEPHONE (OUTPATIENT)
Dept: FAMILY MEDICINE | Facility: CLINIC | Age: 77
End: 2022-11-10
Payer: MEDICARE

## 2022-11-10 NOTE — TELEPHONE ENCOUNTER
Spoke with pt , instructed him that he has an appt for 2/1 pt verbalized understanding, states he doesn't need anything else.

## 2022-11-28 ENCOUNTER — PATIENT MESSAGE (OUTPATIENT)
Dept: ADMINISTRATIVE | Facility: HOSPITAL | Age: 77
End: 2022-11-28
Payer: MEDICARE

## 2022-11-28 ENCOUNTER — PATIENT OUTREACH (OUTPATIENT)
Dept: ADMINISTRATIVE | Facility: HOSPITAL | Age: 77
End: 2022-11-28
Payer: MEDICARE

## 2022-11-28 NOTE — PROGRESS NOTES
Uncontrolled BP REPORT  BP Readings from Last 3 Encounters:   10/31/22 (!) 193/86   08/01/22 130/80   02/01/22 134/70       Non-compliant report chart audits for HYPERTENSION MANAGEMENT     Outreach to patient in reference to hypertension management       NEED REMOTE HOME BP READING DOCUMENTED   OR  BP FOLLOW UP WITH NURSE VISIT OR CARE TEAM MEMBER

## 2023-01-14 ENCOUNTER — CLINICAL SUPPORT (OUTPATIENT)
Dept: CARDIOLOGY | Facility: HOSPITAL | Age: 78
End: 2023-01-14
Payer: MEDICARE

## 2023-01-14 DIAGNOSIS — Z95.0 PRESENCE OF CARDIAC PACEMAKER: ICD-10-CM

## 2023-01-14 PROCEDURE — 93294 CARDIAC DEVICE CHECK - REMOTE: ICD-10-PCS | Mod: ,,, | Performed by: INTERNAL MEDICINE

## 2023-01-14 PROCEDURE — 93294 REM INTERROG EVL PM/LDLS PM: CPT | Mod: ,,, | Performed by: INTERNAL MEDICINE

## 2023-01-14 PROCEDURE — 93296 REM INTERROG EVL PM/IDS: CPT | Mod: PO | Performed by: INTERNAL MEDICINE

## 2023-01-19 ENCOUNTER — PATIENT OUTREACH (OUTPATIENT)
Dept: ADMINISTRATIVE | Facility: HOSPITAL | Age: 78
End: 2023-01-19
Payer: MEDICARE

## 2023-01-19 NOTE — PROGRESS NOTES
Uncontrolled BP REPORT  BP Readings from Last 3 Encounters:   10/31/22 (!) 193/86   22 130/80   22 134/70   2023 Care Everywhere updates requested and reviewed.  Immunizations reconciled. Media reports reviewed.  Duplicate HM overrides and  orders removed.  Overdue HM topic chart audit and/or requested.  Overdue lab testing linked to upcoming lab appointments if applies.    Future Appointments   Date Time Provider Department Center   2023  8:00 AM Matias Dickey MD [facility] FAM MED [place]   2023  9:30 AM Taco Rothman MD [facility] CARDIO [place]   2023 10:00 AM HOME MONITOR DEVICE CHECK, [facility] [place]

## 2023-02-01 ENCOUNTER — HOSPITAL ENCOUNTER (OUTPATIENT)
Dept: RADIOLOGY | Facility: HOSPITAL | Age: 78
Discharge: HOME | End: 2023-02-01
Attending: FAMILY MEDICINE
Payer: MEDICARE

## 2023-02-01 ENCOUNTER — OFFICE VISIT (OUTPATIENT)
Dept: FAMILY MEDICINE | Facility: CLINIC | Age: 78
End: 2023-02-01
Payer: MEDICARE

## 2023-02-01 ENCOUNTER — OFFICE VISIT (OUTPATIENT)
Dept: CARDIOLOGY | Facility: CLINIC | Age: 78
End: 2023-02-01
Payer: MEDICARE

## 2023-02-01 VITALS
HEART RATE: 62 BPM | DIASTOLIC BLOOD PRESSURE: 77 MMHG | WEIGHT: 164.25 LBS | BODY MASS INDEX: 24.89 KG/M2 | HEIGHT: 68 IN | SYSTOLIC BLOOD PRESSURE: 187 MMHG

## 2023-02-01 VITALS
OXYGEN SATURATION: 98 % | HEART RATE: 62 BPM | HEIGHT: 68 IN | SYSTOLIC BLOOD PRESSURE: 130 MMHG | BODY MASS INDEX: 24.92 KG/M2 | TEMPERATURE: 98 F | WEIGHT: 164.44 LBS | DIASTOLIC BLOOD PRESSURE: 70 MMHG | RESPIRATION RATE: 18 BRPM

## 2023-02-01 DIAGNOSIS — R26.89 BALANCE PROBLEM: ICD-10-CM

## 2023-02-01 DIAGNOSIS — I10 PRIMARY HYPERTENSION: ICD-10-CM

## 2023-02-01 DIAGNOSIS — N40.0 BENIGN PROSTATIC HYPERPLASIA, UNSPECIFIED WHETHER LOWER URINARY TRACT SYMPTOMS PRESENT: Primary | ICD-10-CM

## 2023-02-01 DIAGNOSIS — M25.551 HIP PAIN, RIGHT: ICD-10-CM

## 2023-02-01 DIAGNOSIS — M47.816 LUMBAR SPONDYLOSIS: ICD-10-CM

## 2023-02-01 DIAGNOSIS — Z95.5 S/P CORONARY ARTERY STENT PLACEMENT: ICD-10-CM

## 2023-02-01 DIAGNOSIS — Z95.0 PACEMAKER: ICD-10-CM

## 2023-02-01 DIAGNOSIS — I25.10 CORONARY ARTERY DISEASE INVOLVING NATIVE CORONARY ARTERY OF NATIVE HEART WITHOUT ANGINA PECTORIS: ICD-10-CM

## 2023-02-01 DIAGNOSIS — I49.5 SSS (SICK SINUS SYNDROME): Primary | ICD-10-CM

## 2023-02-01 DIAGNOSIS — S61.311A LACERATION OF LEFT INDEX FINGER WITHOUT FOREIGN BODY WITH DAMAGE TO NAIL, INITIAL ENCOUNTER: ICD-10-CM

## 2023-02-01 PROCEDURE — 72114 X-RAY EXAM L-S SPINE BENDING: CPT | Mod: TC,FY,PO

## 2023-02-01 PROCEDURE — 1126F PR PAIN SEVERITY QUANTIFIED, NO PAIN PRESENT: ICD-10-PCS | Mod: CPTII,S$GLB,, | Performed by: INTERNAL MEDICINE

## 2023-02-01 PROCEDURE — 1125F AMNT PAIN NOTED PAIN PRSNT: CPT | Mod: CPTII,S$GLB,, | Performed by: FAMILY MEDICINE

## 2023-02-01 PROCEDURE — 3078F DIAST BP <80 MM HG: CPT | Mod: CPTII,S$GLB,, | Performed by: FAMILY MEDICINE

## 2023-02-01 PROCEDURE — 99214 OFFICE O/P EST MOD 30 MIN: CPT | Mod: S$GLB,,, | Performed by: INTERNAL MEDICINE

## 2023-02-01 PROCEDURE — 72114 X-RAY EXAM L-S SPINE BENDING: CPT | Mod: 26,,, | Performed by: RADIOLOGY

## 2023-02-01 PROCEDURE — 1125F PR PAIN SEVERITY QUANTIFIED, PAIN PRESENT: ICD-10-PCS | Mod: CPTII,S$GLB,, | Performed by: FAMILY MEDICINE

## 2023-02-01 PROCEDURE — 3075F SYST BP GE 130 - 139MM HG: CPT | Mod: CPTII,S$GLB,, | Performed by: FAMILY MEDICINE

## 2023-02-01 PROCEDURE — 1101F PT FALLS ASSESS-DOCD LE1/YR: CPT | Mod: CPTII,S$GLB,, | Performed by: FAMILY MEDICINE

## 2023-02-01 PROCEDURE — 1159F MED LIST DOCD IN RCRD: CPT | Mod: CPTII,S$GLB,, | Performed by: INTERNAL MEDICINE

## 2023-02-01 PROCEDURE — 1126F AMNT PAIN NOTED NONE PRSNT: CPT | Mod: CPTII,S$GLB,, | Performed by: INTERNAL MEDICINE

## 2023-02-01 PROCEDURE — 72114 XR LUMBAR SPINE 5 VIEW WITH FLEX AND EXT: ICD-10-PCS | Mod: 26,,, | Performed by: RADIOLOGY

## 2023-02-01 PROCEDURE — 3078F PR MOST RECENT DIASTOLIC BLOOD PRESSURE < 80 MM HG: ICD-10-PCS | Mod: CPTII,S$GLB,, | Performed by: INTERNAL MEDICINE

## 2023-02-01 PROCEDURE — 73521 X-RAY EXAM HIPS BI 2 VIEWS: CPT | Mod: TC,FY,PO

## 2023-02-01 PROCEDURE — 99999 PR PBB SHADOW E&M-EST. PATIENT-LVL V: ICD-10-PCS | Mod: PBBFAC,,, | Performed by: FAMILY MEDICINE

## 2023-02-01 PROCEDURE — 1159F PR MEDICATION LIST DOCUMENTED IN MEDICAL RECORD: ICD-10-PCS | Mod: CPTII,S$GLB,, | Performed by: INTERNAL MEDICINE

## 2023-02-01 PROCEDURE — 3288F FALL RISK ASSESSMENT DOCD: CPT | Mod: CPTII,S$GLB,, | Performed by: FAMILY MEDICINE

## 2023-02-01 PROCEDURE — 1159F PR MEDICATION LIST DOCUMENTED IN MEDICAL RECORD: ICD-10-PCS | Mod: CPTII,S$GLB,, | Performed by: FAMILY MEDICINE

## 2023-02-01 PROCEDURE — 3075F PR MOST RECENT SYSTOLIC BLOOD PRESS GE 130-139MM HG: ICD-10-PCS | Mod: CPTII,S$GLB,, | Performed by: FAMILY MEDICINE

## 2023-02-01 PROCEDURE — 1159F MED LIST DOCD IN RCRD: CPT | Mod: CPTII,S$GLB,, | Performed by: FAMILY MEDICINE

## 2023-02-01 PROCEDURE — 99999 PR PBB SHADOW E&M-EST. PATIENT-LVL II: CPT | Mod: PBBFAC,,, | Performed by: INTERNAL MEDICINE

## 2023-02-01 PROCEDURE — 3078F DIAST BP <80 MM HG: CPT | Mod: CPTII,S$GLB,, | Performed by: INTERNAL MEDICINE

## 2023-02-01 PROCEDURE — 1101F PR PT FALLS ASSESS DOC 0-1 FALLS W/OUT INJ PAST YR: ICD-10-PCS | Mod: CPTII,S$GLB,, | Performed by: FAMILY MEDICINE

## 2023-02-01 PROCEDURE — 99214 PR OFFICE/OUTPT VISIT, EST, LEVL IV, 30-39 MIN: ICD-10-PCS | Mod: S$GLB,,, | Performed by: FAMILY MEDICINE

## 2023-02-01 PROCEDURE — 73521 X-RAY EXAM HIPS BI 2 VIEWS: CPT | Mod: 26,,, | Performed by: RADIOLOGY

## 2023-02-01 PROCEDURE — 73521 XR HIPS BILATERAL 2 VIEW INCL AP PELVIS: ICD-10-PCS | Mod: 26,,, | Performed by: RADIOLOGY

## 2023-02-01 PROCEDURE — 3078F PR MOST RECENT DIASTOLIC BLOOD PRESSURE < 80 MM HG: ICD-10-PCS | Mod: CPTII,S$GLB,, | Performed by: FAMILY MEDICINE

## 2023-02-01 PROCEDURE — 99214 OFFICE O/P EST MOD 30 MIN: CPT | Mod: S$GLB,,, | Performed by: FAMILY MEDICINE

## 2023-02-01 PROCEDURE — 1160F RVW MEDS BY RX/DR IN RCRD: CPT | Mod: CPTII,S$GLB,, | Performed by: FAMILY MEDICINE

## 2023-02-01 PROCEDURE — 1160F PR REVIEW ALL MEDS BY PRESCRIBER/CLIN PHARMACIST DOCUMENTED: ICD-10-PCS | Mod: CPTII,S$GLB,, | Performed by: FAMILY MEDICINE

## 2023-02-01 PROCEDURE — 99214 PR OFFICE/OUTPT VISIT, EST, LEVL IV, 30-39 MIN: ICD-10-PCS | Mod: S$GLB,,, | Performed by: INTERNAL MEDICINE

## 2023-02-01 PROCEDURE — 99999 PR PBB SHADOW E&M-EST. PATIENT-LVL V: CPT | Mod: PBBFAC,,, | Performed by: FAMILY MEDICINE

## 2023-02-01 PROCEDURE — 3077F PR MOST RECENT SYSTOLIC BLOOD PRESSURE >= 140 MM HG: ICD-10-PCS | Mod: CPTII,S$GLB,, | Performed by: INTERNAL MEDICINE

## 2023-02-01 PROCEDURE — 3077F SYST BP >= 140 MM HG: CPT | Mod: CPTII,S$GLB,, | Performed by: INTERNAL MEDICINE

## 2023-02-01 PROCEDURE — 99999 PR PBB SHADOW E&M-EST. PATIENT-LVL II: ICD-10-PCS | Mod: PBBFAC,,, | Performed by: INTERNAL MEDICINE

## 2023-02-01 PROCEDURE — 3288F PR FALLS RISK ASSESSMENT DOCUMENTED: ICD-10-PCS | Mod: CPTII,S$GLB,, | Performed by: FAMILY MEDICINE

## 2023-02-01 NOTE — PROGRESS NOTES
Subjective:    Patient ID:  José Miguel Austin is a 77 y.o. male who presents for follow-up of SSS (3 month f/u )      HPI  He comes for follow up with no major problems, no chest pain, no shortness of breath.  BP normal at home    Review of Systems   Constitutional: Negative for decreased appetite, malaise/fatigue, weight gain and weight loss.   Cardiovascular:  Negative for chest pain, dyspnea on exertion, leg swelling, palpitations and syncope.   Respiratory:  Negative for cough and shortness of breath.    Gastrointestinal: Negative.    Neurological:  Negative for weakness.   All other systems reviewed and are negative.     Objective:      Physical Exam  Vitals and nursing note reviewed.   Constitutional:       Appearance: Normal appearance. He is well-developed.   HENT:      Head: Normocephalic.   Eyes:      Pupils: Pupils are equal, round, and reactive to light.   Neck:      Thyroid: No thyromegaly.      Vascular: No carotid bruit or JVD.   Cardiovascular:      Rate and Rhythm: Normal rate and regular rhythm.      Chest Wall: PMI is not displaced.      Pulses: Normal pulses and intact distal pulses.      Heart sounds: Normal heart sounds. No murmur heard.    No gallop.   Pulmonary:      Effort: Pulmonary effort is normal.      Breath sounds: Normal breath sounds.   Abdominal:      Palpations: Abdomen is soft. There is no mass.      Tenderness: There is no abdominal tenderness.   Musculoskeletal:         General: Normal range of motion.      Cervical back: Normal range of motion and neck supple.   Skin:     General: Skin is warm.   Neurological:      Mental Status: He is alert and oriented to person, place, and time.      Sensory: No sensory deficit.      Deep Tendon Reflexes: Reflexes are normal and symmetric.         Assessment:       1. SSS (sick sinus syndrome)    2. Pacemaker    3. S/P coronary artery stent placement    4. Primary hypertension    5. Coronary artery disease involving native coronary artery of  native heart without angina pectoris         Plan:     Continue all cardiac medications  Regular exercise program  Six-month follow-up with echocardiogram and nuclear stress test

## 2023-02-01 NOTE — PROGRESS NOTES
Chief Complaint   Patient presents with    Depression     6 month follow up     Fall     Fell at home two days ago       77 year old male patient presents for 6 month f/u on Wellbutrin    He feels like he is doing well with this.  Some improved concentration, focus, decreased irritability  Denies SE on medication  He would like to continue this and would like to increase the dose.  Initially c/o depression, apathy, depression, irritable, frustrations and trouble concentrating.    Some issues with losing balance when changing positions.  This seems to be progressive.  He has fallen a few times in the last 6 months.  Denies dizziness.  He just started doing leg presses.  Denies any LE numbness/tingling.    C/o some right hip pain in the groin injury.  It is worse with hip flexion and with hip rotation and walking.    He feel yesterday and landed on his left 2nd finger and injured the fingernail.    Getting testosterone injections twice monthly and following with Dr. Barrientos.   CAD s/p stents,SSS - following with Dr. Rothman, tolerating Pravachol 40mg daily  Hypogonadism - following with Dr. Barrientos  Chronic back pain - s/p lumbar surgery (discectomy) in 1998; he has no reflex in the left leg.  BPH - c/o incoplete bladder emptying    He exercises regularly 2-3 weeks with weights.      Past Medical History:   Diagnosis Date    Basal cell carcinoma     CAD (coronary artery disease)     Hypertension        Past Surgical History:   Procedure Laterality Date    APPENDECTOMY      BASAL CELL CARCINOMA EXCISION      left forehead    CARDIAC PACEMAKER PLACEMENT      CIRCUMCISION      CORONARY STENT PLACEMENT      4 stents in RCA    inguinal hernia      LUMBAR DISC SURGERY  1998       Allergies   Allergen Reactions    Hydrocodone Nausea Only       Social History     Socioeconomic History    Marital status:     Number of children: 3   Occupational History    Occupation: retired refinery     Occupation:  8,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 use no more than 3 times per week 5 each 12    amLODIPine (NORVASC) 5 MG tablet Take 1 tablet (5 mg total) by mouth once daily. 30 tablet 11    buPROPion (WELLBUTRIN XL) 300 MG 24 hr tablet Take 1 tablet (300 mg total) by mouth once daily. 90 tablet 3    coenzyme Q10 100 mg capsule Take 100 mg by mouth once daily.      fish oil-omega-3 fatty acids 300-1,000 mg capsule Take by mouth once daily.      multivitamin capsule Take 1 capsule by mouth once daily.      oxybutynin (DITROPAN-XL) 10 MG 24 hr tablet Take 1 tablet (10 mg total) by mouth once daily. 30 tablet 11    prasterone, dhea, 50 mg Tab Take by mouth once daily.      pravastatin (PRAVACHOL) 40 MG tablet TAKE 1 TABLET BY MOUTH DAILY 90 tablet 3    testosterone cypionate (DEPOTESTOTERONE CYPIONATE) 200 mg/mL injection Inject 200 mg into the muscle every 30 days.        No current facility-administered medications on file prior to visit.       Family History   Problem Relation Age of Onset    Heart disease Mother     Heart disease Father     Heart disease Sister     Heart disease Brother        ROS:  GENERAL: No fever, chills, or weight loss. C/o persistent fatigue and feeling down.  SKIN: No rashes, itching or changes in color or texture of skin.  HEAD: No headaches or recent head trauma.  EYES: Visual acuity fine. No photophobia, ocular pain or diplopia.  EARS: Denies ear pain, discharge or  "vertigo.  NOSE: No loss of smell, no epistaxis or postnasal drip.  MOUTH & THROAT: No hoarseness or change in voice. No excessive gum bleeding.  NODES: Denies swollen glands.  CHEST: Denies SANON, cyanosis, wheezing, cough and sputum production.  CARDIOVASCULAR: Denies chest pain, PND, orthopnea or reduced exercise tolerance. C/o dizziness.  ABDOMEN: Appetite fine. No weight loss. Denies diarrhea, abdominal pain, hematemesis or blood in stool.  URINARY: No flank pain, dysuria or hematuria.  PERIPHERAL VASCULAR: No claudication or cyanosis.  MUSCULOSKELETAL: No joint stiffness or swelling. Denies back pain.  NEUROLOGIC: No history of seizures, paralysis, alteration of gait or coordination.    PE:   /70   Pulse 62   Temp 97.9 °F (36.6 °C) (Oral)   Resp 18   Ht 5' 8" (1.727 m)   Wt 74.6 kg (164 lb 7.4 oz)   SpO2 98%   BMI 25.01 kg/m²     APPEARANCE: Well nourished, well developed, in no acute distress.   SKIN: Warm, dry. Large ecchymotic area dorsal surface right hand.   HEENT: Normocephalic, atraumatic. No pharyngeal erythema or exudate. No stridor.   NECK: Supple. NO adenopathy or thyromegaly.   CHEST: Lungs clear to auscultation. No adventitious sounds.   CARDIOVASCULAR: Normal S1, S2. No rubs, murmurs or gallops. Brisk carotid pulses without bruits.   ABDOMEN: Bowel sounds normal. Not distended. Soft. No tenderness or masses.  PERIPHERAL VASCULAR: Femoral pulses present and symmetrical. No edema.  MUSCULOSKELETAL: Unremarkable. There is no joint erythema or warmth noted and there is normal range of motion of all major joints.  BACK: No CVA tenderness. There is no spasm, tenderness or radiculopathy noted with palpation and there is full range of motion.   NEUROLOGIC: Cranial Nerves: II-XII grossly intact.  Motor: 5/5 strength major flexors/extensors. No tremor.  Gait & Posture: Normal gait and fine motion. No cerebellar signs.  Psych: mood euthymic; insight and judgement normal          Results for orders " placed or performed in visit on 11/01/22   LIUDMILA   Result Value Ref Range    LIUDMILA Screen Negative <1:80 Negative <1:80   CBC Auto Differential   Result Value Ref Range    WBC 9.33 3.90 - 12.70 K/uL    RBC 5.02 4.60 - 6.20 M/uL    Hemoglobin 16.5 14.0 - 18.0 g/dL    Hematocrit 50.4 40.0 - 54.0 %     (H) 82 - 98 fL    MCH 32.9 (H) 27.0 - 31.0 pg    MCHC 32.7 32.0 - 36.0 g/dL    RDW 13.8 11.5 - 14.5 %    Platelets 332 150 - 450 K/uL    MPV 10.3 9.2 - 12.9 fL    Immature Granulocytes 0.5 0.0 - 0.5 %    Gran # (ANC) 5.7 1.8 - 7.7 K/uL    Immature Grans (Abs) 0.05 (H) 0.00 - 0.04 K/uL    Lymph # 2.2 1.0 - 4.8 K/uL    Mono # 1.1 (H) 0.3 - 1.0 K/uL    Eos # 0.2 0.0 - 0.5 K/uL    Baso # 0.07 0.00 - 0.20 K/uL    nRBC 0 0 /100 WBC    Gran % 60.5 38.0 - 73.0 %    Lymph % 23.5 18.0 - 48.0 %    Mono % 12.1 4.0 - 15.0 %    Eosinophil % 2.6 0.0 - 8.0 %    Basophil % 0.8 0.0 - 1.9 %    Differential Method Automated    Comprehensive Metabolic Panel   Result Value Ref Range    Sodium 136 136 - 145 mmol/L    Potassium 4.1 3.5 - 5.1 mmol/L    Chloride 100 95 - 110 mmol/L    CO2 25 23 - 29 mmol/L    Glucose 83 70 - 110 mg/dL    BUN 16 8 - 23 mg/dL    Creatinine 1.2 0.5 - 1.4 mg/dL    Calcium 9.1 8.7 - 10.5 mg/dL    Total Protein 7.3 6.0 - 8.4 g/dL    Albumin 3.7 3.5 - 5.2 g/dL    Total Bilirubin 0.6 0.1 - 1.0 mg/dL    Alkaline Phosphatase 86 55 - 135 U/L    AST 37 10 - 40 U/L    ALT 33 10 - 44 U/L    Anion Gap 11 8 - 16 mmol/L    eGFR >60.0 >60 mL/min/1.73 m^2   CK   Result Value Ref Range     (H) 20 - 200 U/L   DHEA-SULFATE   Result Value Ref Range    DHEA-SO4 43.0 Not Established ug/dL   LIPID PANEL   Result Value Ref Range    Cholesterol 155 120 - 199 mg/dL    Triglycerides 54 30 - 150 mg/dL    HDL 48 40 - 75 mg/dL    LDL Cholesterol 96.2 63.0 - 159.0 mg/dL    HDL/Cholesterol Ratio 31.0 20.0 - 50.0 %    Total Cholesterol/HDL Ratio 3.2 2.0 - 5.0    Non-HDL Cholesterol 107 mg/dL   LUTEINIZING HORMONE   Result Value Ref Range     LH 0.3 (L) 0.6 - 12.1 mIU/mL   PSA, Screening   Result Value Ref Range    PSA, Screen 0.87 0.00 - 4.00 ng/mL   Sedimentation rate   Result Value Ref Range    Sed Rate 1 0 - 10 mm/Hr   TSH   Result Value Ref Range    TSH 3.448 0.400 - 4.000 uIU/mL   T4, FREE   Result Value Ref Range    Free T4 0.79 0.71 - 1.51 ng/dL   Testosterone   Result Value Ref Range    Testosterone, Total >1500 (H) 304 - 1227 ng/dL   Testosterone, free   Result Value Ref Range    Testosterone, Free 22.0 pg/mL   C-reactive protein   Result Value Ref Range    CRP 2.2 0.0 - 8.2 mg/L   ALDOLASE   Result Value Ref Range    Aldolase 5.3 1.2 - 7.6 U/L   T3, Free   Result Value Ref Range    T3, Free 3.0 2.3 - 4.2 pg/mL   Vitamin D   Result Value Ref Range    Vit D, 25-Hydroxy 62 30 - 96 ng/mL           Benign prostatic hyperplasia, unspecified whether lower urinary tract symptoms present  -     Ambulatory referral/consult to Urology; Future; Expected date: 02/08/2023    Balance problem  -     Ambulatory referral/consult to Physical/Occupational Therapy; Future; Expected date: 02/08/2023    Hip pain, right  -     Ambulatory referral/consult to Physical/Occupational Therapy; Future; Expected date: 02/08/2023  -     X-Ray Hips Bilateral 2 View Incl AP Pelvis; Future; Expected date: 02/01/2023    Lumbar spondylosis  -     X-Ray Lumbar Complete Including Flex And Ext; Future; Expected date: 02/01/2023    Laceration of left index finger without foreign body with damage to nail, initial encounter  -     SPLINT FOR HOME USE       Wound care and splint for injured finger  stable  wellbutrin 300mg  Continue other present meds  Counselled on regular exercise, maintenance of a healthy weight, balanced diet rich in fruits/vegetables, avoidance of unhealthy habits.  Continue present testosterone replacement and endocrinology follow-up  F/u 6 months

## 2023-03-07 ENCOUNTER — OFFICE VISIT (OUTPATIENT)
Dept: UROLOGY | Facility: CLINIC | Age: 78
End: 2023-03-07
Payer: MEDICARE

## 2023-03-07 VITALS — WEIGHT: 163.38 LBS | BODY MASS INDEX: 24.76 KG/M2 | HEIGHT: 68 IN

## 2023-03-07 DIAGNOSIS — N39.41 URGE INCONTINENCE: ICD-10-CM

## 2023-03-07 DIAGNOSIS — N40.1 BENIGN PROSTATIC HYPERPLASIA WITH URINARY OBSTRUCTION: Primary | ICD-10-CM

## 2023-03-07 DIAGNOSIS — N13.8 BENIGN PROSTATIC HYPERPLASIA WITH URINARY OBSTRUCTION: Primary | ICD-10-CM

## 2023-03-07 PROCEDURE — 3288F FALL RISK ASSESSMENT DOCD: CPT | Mod: CPTII,S$GLB,, | Performed by: STUDENT IN AN ORGANIZED HEALTH CARE EDUCATION/TRAINING PROGRAM

## 2023-03-07 PROCEDURE — 99214 PR OFFICE/OUTPT VISIT, EST, LEVL IV, 30-39 MIN: ICD-10-PCS | Mod: S$GLB,,, | Performed by: STUDENT IN AN ORGANIZED HEALTH CARE EDUCATION/TRAINING PROGRAM

## 2023-03-07 PROCEDURE — 1160F RVW MEDS BY RX/DR IN RCRD: CPT | Mod: CPTII,S$GLB,, | Performed by: STUDENT IN AN ORGANIZED HEALTH CARE EDUCATION/TRAINING PROGRAM

## 2023-03-07 PROCEDURE — 3288F PR FALLS RISK ASSESSMENT DOCUMENTED: ICD-10-PCS | Mod: CPTII,S$GLB,, | Performed by: STUDENT IN AN ORGANIZED HEALTH CARE EDUCATION/TRAINING PROGRAM

## 2023-03-07 PROCEDURE — 1126F PR PAIN SEVERITY QUANTIFIED, NO PAIN PRESENT: ICD-10-PCS | Mod: CPTII,S$GLB,, | Performed by: STUDENT IN AN ORGANIZED HEALTH CARE EDUCATION/TRAINING PROGRAM

## 2023-03-07 PROCEDURE — 99999 PR PBB SHADOW E&M-EST. PATIENT-LVL III: CPT | Mod: PBBFAC,,, | Performed by: STUDENT IN AN ORGANIZED HEALTH CARE EDUCATION/TRAINING PROGRAM

## 2023-03-07 PROCEDURE — 99999 PR PBB SHADOW E&M-EST. PATIENT-LVL III: ICD-10-PCS | Mod: PBBFAC,,, | Performed by: STUDENT IN AN ORGANIZED HEALTH CARE EDUCATION/TRAINING PROGRAM

## 2023-03-07 PROCEDURE — 1159F MED LIST DOCD IN RCRD: CPT | Mod: CPTII,S$GLB,, | Performed by: STUDENT IN AN ORGANIZED HEALTH CARE EDUCATION/TRAINING PROGRAM

## 2023-03-07 PROCEDURE — 1159F PR MEDICATION LIST DOCUMENTED IN MEDICAL RECORD: ICD-10-PCS | Mod: CPTII,S$GLB,, | Performed by: STUDENT IN AN ORGANIZED HEALTH CARE EDUCATION/TRAINING PROGRAM

## 2023-03-07 PROCEDURE — 99214 OFFICE O/P EST MOD 30 MIN: CPT | Mod: S$GLB,,, | Performed by: STUDENT IN AN ORGANIZED HEALTH CARE EDUCATION/TRAINING PROGRAM

## 2023-03-07 PROCEDURE — 1160F PR REVIEW ALL MEDS BY PRESCRIBER/CLIN PHARMACIST DOCUMENTED: ICD-10-PCS | Mod: CPTII,S$GLB,, | Performed by: STUDENT IN AN ORGANIZED HEALTH CARE EDUCATION/TRAINING PROGRAM

## 2023-03-07 PROCEDURE — 1101F PT FALLS ASSESS-DOCD LE1/YR: CPT | Mod: CPTII,S$GLB,, | Performed by: STUDENT IN AN ORGANIZED HEALTH CARE EDUCATION/TRAINING PROGRAM

## 2023-03-07 PROCEDURE — 1126F AMNT PAIN NOTED NONE PRSNT: CPT | Mod: CPTII,S$GLB,, | Performed by: STUDENT IN AN ORGANIZED HEALTH CARE EDUCATION/TRAINING PROGRAM

## 2023-03-07 PROCEDURE — 1101F PR PT FALLS ASSESS DOC 0-1 FALLS W/OUT INJ PAST YR: ICD-10-PCS | Mod: CPTII,S$GLB,, | Performed by: STUDENT IN AN ORGANIZED HEALTH CARE EDUCATION/TRAINING PROGRAM

## 2023-03-07 RX ORDER — TAMSULOSIN HYDROCHLORIDE 0.4 MG/1
0.4 CAPSULE ORAL NIGHTLY
Qty: 30 CAPSULE | Refills: 11 | Status: SHIPPED | OUTPATIENT
Start: 2023-03-07 | End: 2024-03-06

## 2023-03-07 NOTE — PROGRESS NOTES
"[facility] - Urology   Clinic Note    Subjective:     Chief Complaint: Urinary Incontinence, Nocturia, and Urinary Urgency      History of Present Illness:  José Miguel Austin is a 77 y.o. male who presents to clinic for evaluation and management of BPH and LUTS. He is new to our clinic referred by Dr. Matias Dickey    He reports hesitancy and weak urinary stream onset a few years ago which has been progressive more recently. He reports rare dribbling with urgency and urge urinary incontinence. He was previously prescribed oxybutynin by Dr. Tobar but has not noticed much benefit. He tried flomax many years at the same time of starting an antihypertensive medication - both were stopped due to lightheadedness and low blood pressure.   IPSS Questionnaire (AUA-SS):  1)  Incomplete Emptying 2 - Less than half the time   2)  Frequency 3 - About half the time   3)  Intermittency 0 - Not at all   4) Urgency 5 - Almost always   5) Weak Stream  3 - About half the time   6) Straining 0 - Not at all   7) Nocturia 5 - 5+ times   Total score:  0-7 mild, 8-19 mod, 20-35 severe 18   Quality of Life:  4 - Mostly Dissatisfied       He was previously on testosterone but stopped it a few months ago.    PSAs have been below 1 for years.     Past medical, family, surgical and social history reviewed as documented in chart with pertinent positive medical, family, surgical and social history detailed in HPI.    A review systems was conducted with pertinent positive and negative findings documented in HPI.    Anticoagulation/Antiplatelets:  No    Objective:     Estimated body mass index is 24.84 kg/m² as calculated from the following:    Height as of this encounter: 5' 8" (1.727 m).    Weight as of this encounter: 74.1 kg (163 lb 5.8 oz).    Vital Signs (Most Recent)       Physical Exam  Vitals and nursing note reviewed.   Constitutional:       General: He is not in acute distress.     Appearance: He is not ill-appearing or " toxic-appearing.   Pulmonary:      Effort: Pulmonary effort is normal. No accessory muscle usage or respiratory distress.   Genitourinary:     Comments: Prostate 25 g, smooth, no nodules.  Neurological:      Mental Status: He is alert.       Lab Results   Component Value Date    BUN 16 11/01/2022    CREATININE 1.2 11/01/2022    WBC 9.33 11/01/2022    HGB 16.5 11/01/2022    HCT 50.4 11/01/2022     11/01/2022    AST 37 11/01/2022    ALT 33 11/01/2022    ALKPHOS 86 11/01/2022    ALBUMIN 3.7 11/01/2022    HGBA1C 5.4 03/30/2021        Lab Results   Component Value Date    PSA 0.87 11/01/2022    PSA 0.93 09/30/2021    PSA 0.71 02/07/2020    PSA 0.62 12/20/2018    PSA 0.52 12/03/2018    PSA 0.94 11/21/2017    PSA 0.83 10/04/2017    PSA 0.66 03/09/2016    PSA 0.82 02/04/2016    PSA 0.50 01/20/2015    PSADIAG 0.95 04/26/2022    PSAFREE 0.13 10/04/2017    PSAFREEPCT 28.26 10/04/2017      Assessment:     1. Benign prostatic hyperplasia with urinary obstruction    2. Urge incontinence      Plan:     We discussed the association of lower urinary tract symptoms (LUTS) and prostate enlargement (BPH). The management of BPH varies widely depending on the degree of bother, adverse events related to BPH, and the overall size of the prostate. We discussed behavioral modifications, medical management,  and outlet procedures, as well as the indications, risks, and benefits.    Recommended next steps  - Prostate ultrasound for size evaluation  - office based flexible cystoscopy  - start flomax - he will let me know if he has any side effects and we can consider Rapaflo  - okay to continue the oxybutynin for now      Nathan Tyler MD

## 2023-03-08 ENCOUNTER — HOSPITAL ENCOUNTER (OUTPATIENT)
Dept: RADIOLOGY | Facility: HOSPITAL | Age: 78
Discharge: HOME | End: 2023-03-08
Attending: STUDENT IN AN ORGANIZED HEALTH CARE EDUCATION/TRAINING PROGRAM
Payer: MEDICARE

## 2023-03-08 DIAGNOSIS — N13.8 BENIGN PROSTATIC HYPERPLASIA WITH URINARY OBSTRUCTION: ICD-10-CM

## 2023-03-08 DIAGNOSIS — N40.1 BENIGN PROSTATIC HYPERPLASIA WITH URINARY OBSTRUCTION: ICD-10-CM

## 2023-03-08 PROCEDURE — 76857 US EXAM PELVIC LIMITED: CPT | Mod: TC,PO

## 2023-03-08 PROCEDURE — 76857 US EXAM PELVIC LIMITED: CPT | Mod: 26,,, | Performed by: RADIOLOGY

## 2023-03-08 PROCEDURE — 76857 US PELVIS LIMITED NON OB: ICD-10-PCS | Mod: 26,,, | Performed by: RADIOLOGY

## 2023-03-31 ENCOUNTER — PROCEDURE VISIT (OUTPATIENT)
Dept: UROLOGY | Facility: CLINIC | Age: 78
End: 2023-03-31
Payer: MEDICARE

## 2023-03-31 DIAGNOSIS — E34.9 TESTOSTERONE DEFICIENCY: Primary | ICD-10-CM

## 2023-03-31 DIAGNOSIS — N40.1 BENIGN PROSTATIC HYPERPLASIA WITH URINARY OBSTRUCTION: ICD-10-CM

## 2023-03-31 DIAGNOSIS — N13.8 BENIGN PROSTATIC HYPERPLASIA WITH URINARY OBSTRUCTION: ICD-10-CM

## 2023-03-31 LAB — POC RESIDUAL URINE VOLUME: 50 ML (ref 0–100)

## 2023-03-31 PROCEDURE — 52000 CYSTOSCOPY: ICD-10-PCS | Mod: S$GLB,,, | Performed by: STUDENT IN AN ORGANIZED HEALTH CARE EDUCATION/TRAINING PROGRAM

## 2023-03-31 PROCEDURE — 51798 US URINE CAPACITY MEASURE: CPT | Mod: S$GLB,,, | Performed by: STUDENT IN AN ORGANIZED HEALTH CARE EDUCATION/TRAINING PROGRAM

## 2023-03-31 PROCEDURE — 51798 POCT BLADDER SCAN: ICD-10-PCS | Mod: S$GLB,,, | Performed by: STUDENT IN AN ORGANIZED HEALTH CARE EDUCATION/TRAINING PROGRAM

## 2023-03-31 PROCEDURE — 52000 CYSTOURETHROSCOPY: CPT | Mod: S$GLB,,, | Performed by: STUDENT IN AN ORGANIZED HEALTH CARE EDUCATION/TRAINING PROGRAM

## 2023-03-31 NOTE — PROCEDURES
Cystoscopy    Date/Time: 3/31/2023 11:00 AM  Performed by: Nathan Tyler MD  Authorized by: Nathan Tyler MD       Procedure:   Flexible cysto-urethroscopy    Pre Procedure Diagnosis:  BPH and LUTS    Post Procedure Diagnosis:  Same    Surgeon: Nathan Tyler MD     Anesthesia: 2% uro-jet lidocaine jelly for local analgesia    Procedure note:  The risks and benefits were explained and informed consent was obtained. 2% lidocaine urojet was used for local analgesia. The genitalia was prepped and draped in the sterile fashion.    The flexible scope was advanced into the urethra and into the bladder. There were no urethral strictures noted throughout the course of the urethra.    The bladder was completely surveyed in a systematic fashion. The bilateral ureteral orifices were identified in their normal orthotopic locations bilaterally. The remainder of the bladder was evaluated. There were no foreign bodies, stones, diverticula, or trabeculations. No bladder tumors or lesions suspicious for malignancy were seen.     Upon retroflexion there was no significant median lobe enlargement but mild intravesical prostatic protrusion. As the flexible cystoscope was being withdrawn, the prostate was evaluated carefully. The lateral lobes of the prostate were moderately enlarged.     The patient tolerated the procedure well without complication.     Findings in summary:          Assessment: 77 y.o. male with BPH and LUTS. History of low T on therapy but stopped.     PVR 50 cc  Doing well on flomax. Discussed outlet procedures. I think he would be a great candidate for urolift. He would like to continue medications for now    He is interested in restarting T therapy.     Plan:  Testosterone at 8 am  Follow up pending the T results

## 2023-04-04 ENCOUNTER — OFFICE VISIT (OUTPATIENT)
Dept: FAMILY MEDICINE | Facility: CLINIC | Age: 78
End: 2023-04-04
Payer: MEDICARE

## 2023-04-04 ENCOUNTER — LAB VISIT (OUTPATIENT)
Dept: LAB | Facility: HOSPITAL | Age: 78
End: 2023-04-04
Attending: STUDENT IN AN ORGANIZED HEALTH CARE EDUCATION/TRAINING PROGRAM
Payer: MEDICARE

## 2023-04-04 VITALS
WEIGHT: 164.88 LBS | DIASTOLIC BLOOD PRESSURE: 64 MMHG | HEIGHT: 68 IN | SYSTOLIC BLOOD PRESSURE: 128 MMHG | HEART RATE: 52 BPM | OXYGEN SATURATION: 99 % | TEMPERATURE: 98 F | BODY MASS INDEX: 24.99 KG/M2 | RESPIRATION RATE: 18 BRPM

## 2023-04-04 DIAGNOSIS — E34.9 TESTOSTERONE DEFICIENCY: ICD-10-CM

## 2023-04-04 DIAGNOSIS — F32.A DEPRESSION, UNSPECIFIED DEPRESSION TYPE: ICD-10-CM

## 2023-04-04 DIAGNOSIS — M16.11 PRIMARY OSTEOARTHRITIS OF RIGHT HIP: Primary | ICD-10-CM

## 2023-04-04 DIAGNOSIS — R26.89 BALANCE PROBLEM: ICD-10-CM

## 2023-04-04 LAB — TESTOST SERPL-MCNC: 551 NG/DL (ref 304–1227)

## 2023-04-04 PROCEDURE — 3074F SYST BP LT 130 MM HG: CPT | Mod: CPTII,S$GLB,, | Performed by: FAMILY MEDICINE

## 2023-04-04 PROCEDURE — 1160F PR REVIEW ALL MEDS BY PRESCRIBER/CLIN PHARMACIST DOCUMENTED: ICD-10-PCS | Mod: CPTII,S$GLB,, | Performed by: FAMILY MEDICINE

## 2023-04-04 PROCEDURE — 3078F DIAST BP <80 MM HG: CPT | Mod: CPTII,S$GLB,, | Performed by: FAMILY MEDICINE

## 2023-04-04 PROCEDURE — 1159F MED LIST DOCD IN RCRD: CPT | Mod: CPTII,S$GLB,, | Performed by: FAMILY MEDICINE

## 2023-04-04 PROCEDURE — 84403 ASSAY OF TOTAL TESTOSTERONE: CPT | Performed by: STUDENT IN AN ORGANIZED HEALTH CARE EDUCATION/TRAINING PROGRAM

## 2023-04-04 PROCEDURE — 99999 PR PBB SHADOW E&M-EST. PATIENT-LVL III: CPT | Mod: PBBFAC,,, | Performed by: FAMILY MEDICINE

## 2023-04-04 PROCEDURE — 3074F PR MOST RECENT SYSTOLIC BLOOD PRESSURE < 130 MM HG: ICD-10-PCS | Mod: CPTII,S$GLB,, | Performed by: FAMILY MEDICINE

## 2023-04-04 PROCEDURE — 99214 PR OFFICE/OUTPT VISIT, EST, LEVL IV, 30-39 MIN: ICD-10-PCS | Mod: S$GLB,,, | Performed by: FAMILY MEDICINE

## 2023-04-04 PROCEDURE — 1160F RVW MEDS BY RX/DR IN RCRD: CPT | Mod: CPTII,S$GLB,, | Performed by: FAMILY MEDICINE

## 2023-04-04 PROCEDURE — 36415 COLL VENOUS BLD VENIPUNCTURE: CPT | Mod: PO | Performed by: STUDENT IN AN ORGANIZED HEALTH CARE EDUCATION/TRAINING PROGRAM

## 2023-04-04 PROCEDURE — 3078F PR MOST RECENT DIASTOLIC BLOOD PRESSURE < 80 MM HG: ICD-10-PCS | Mod: CPTII,S$GLB,, | Performed by: FAMILY MEDICINE

## 2023-04-04 PROCEDURE — 1101F PT FALLS ASSESS-DOCD LE1/YR: CPT | Mod: CPTII,S$GLB,, | Performed by: FAMILY MEDICINE

## 2023-04-04 PROCEDURE — 99214 OFFICE O/P EST MOD 30 MIN: CPT | Mod: S$GLB,,, | Performed by: FAMILY MEDICINE

## 2023-04-04 PROCEDURE — 3288F FALL RISK ASSESSMENT DOCD: CPT | Mod: CPTII,S$GLB,, | Performed by: FAMILY MEDICINE

## 2023-04-04 PROCEDURE — 1126F PR PAIN SEVERITY QUANTIFIED, NO PAIN PRESENT: ICD-10-PCS | Mod: CPTII,S$GLB,, | Performed by: FAMILY MEDICINE

## 2023-04-04 PROCEDURE — 1126F AMNT PAIN NOTED NONE PRSNT: CPT | Mod: CPTII,S$GLB,, | Performed by: FAMILY MEDICINE

## 2023-04-04 PROCEDURE — 99999 PR PBB SHADOW E&M-EST. PATIENT-LVL III: ICD-10-PCS | Mod: PBBFAC,,, | Performed by: FAMILY MEDICINE

## 2023-04-04 PROCEDURE — 1101F PR PT FALLS ASSESS DOC 0-1 FALLS W/OUT INJ PAST YR: ICD-10-PCS | Mod: CPTII,S$GLB,, | Performed by: FAMILY MEDICINE

## 2023-04-04 PROCEDURE — 1159F PR MEDICATION LIST DOCUMENTED IN MEDICAL RECORD: ICD-10-PCS | Mod: CPTII,S$GLB,, | Performed by: FAMILY MEDICINE

## 2023-04-04 PROCEDURE — 3288F PR FALLS RISK ASSESSMENT DOCUMENTED: ICD-10-PCS | Mod: CPTII,S$GLB,, | Performed by: FAMILY MEDICINE

## 2023-04-04 NOTE — PROGRESS NOTES
Chief Complaint   Patient presents with    Hip Pain       77 year old male patient presents for 2 month f/u    He did complete PT.  Hip xrays showed bilateral DJD. Still with some pain in the right groin with walking.  Lumbar xrays showed spondylosis and DDD    Depression - tolerating wellbutrin XL 300mg daily. He feels like he is doing well with this.  Some improved concentration, focus, decreased irritability  Denies SE on medication  Initially c/o depression, apathy, depression, irritable, frustrations and trouble concentrating.    Some chronic issues with losing balance when changing positions.  Denies any LE numbness/tingling.    CAD s/p stents,SSS - following with Dr. Rothman, tolerating Pravachol 40mg daily  Hypogonadism - following with urology; considering restarting testosterone pending lab result today  Chronic back pain - s/p lumbar surgery (discectomy) in 1998; he has no reflex in the left leg.  BPH - c/o incoplete bladder emptying; following with Dr. Tyler. Trying flomax with good results. Urolift discussed.     He exercises regularly 2-3 weeks with weights.      Past Medical History:   Diagnosis Date    Basal cell carcinoma     CAD (coronary artery disease)     Hypertension        Past Surgical History:   Procedure Laterality Date    APPENDECTOMY      BASAL CELL CARCINOMA EXCISION      left forehead    CARDIAC PACEMAKER PLACEMENT      CIRCUMCISION      CORONARY STENT PLACEMENT      4 stents in RCA    inguinal hernia      LUMBAR DISC SURGERY  1998       Allergies   Allergen Reactions    Hydrocodone Nausea Only       Social History     Socioeconomic History    Marital status:     Number of children: 3   Occupational History    Occupation: retired refinery     Occupation: 8,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 use no more than 3 times per week 5 each 12    amLODIPine (NORVASC) 5 MG tablet Take 1 tablet (5 mg total) by mouth once daily. 30 tablet 11    buPROPion (WELLBUTRIN XL) 300 MG 24 hr tablet Take 1 tablet (300 mg total) by mouth once daily. 90 tablet 3    coenzyme Q10 100 mg capsule Take 100 mg by mouth once daily.      fish oil-omega-3 fatty acids 300-1,000 mg capsule Take by mouth once daily.      multivitamin capsule Take 1 capsule by mouth once daily.      oxybutynin (DITROPAN-XL) 10 MG 24 hr tablet Take 1 tablet (10 mg total) by mouth once daily. 30 tablet 11    prasterone, dhea, 50 mg Tab Take by mouth once daily.      pravastatin (PRAVACHOL) 40 MG tablet TAKE 1 TABLET BY MOUTH DAILY 90 tablet 2    tamsulosin (FLOMAX) 0.4 mg Cap Take 1 capsule (0.4 mg total) by mouth every evening. 30 capsule 11    testosterone cypionate (DEPOTESTOTERONE CYPIONATE) 200 mg/mL injection Inject 200 mg into the muscle every 30 days.        No current facility-administered medications on file prior to visit.       Family History   Problem Relation Age of Onset    Heart disease Mother     Heart disease Father     Heart disease Sister     Heart disease Brother        ROS:  GENERAL: No fever, chills, or weight loss. C/o persistent fatigue and feeling down.  SKIN: No rashes, itching or changes in color or texture of skin.  HEAD: No headaches or recent head trauma.  EYES: Visual acuity fine. No photophobia, ocular pain or diplopia.  EARS: Denies ear pain, discharge or vertigo.  NOSE: No loss of smell, no epistaxis or postnasal drip.  MOUTH & THROAT: No hoarseness or change  "in voice. No excessive gum bleeding.  NODES: Denies swollen glands.  CHEST: Denies SANON, cyanosis, wheezing, cough and sputum production.  CARDIOVASCULAR: Denies chest pain, PND, orthopnea or reduced exercise tolerance. C/o dizziness.  ABDOMEN: Appetite fine. No weight loss. Denies diarrhea, abdominal pain, hematemesis or blood in stool.  URINARY: No flank pain, dysuria or hematuria.  PERIPHERAL VASCULAR: No claudication or cyanosis.  MUSCULOSKELETAL: No joint stiffness or swelling. Denies back pain.  NEUROLOGIC: No history of seizures, paralysis, alteration of gait or coordination.    PE:   /64   Pulse (!) 52   Temp 98.1 °F (36.7 °C) (Oral)   Resp 18   Ht 5' 8" (1.727 m)   Wt 74.8 kg (164 lb 14.5 oz)   SpO2 99%   BMI 25.07 kg/m²     APPEARANCE: Well nourished, well developed, in no acute distress.   SKIN: Warm, dry. Large ecchymotic area dorsal surface right hand.   HEENT: Normocephalic, atraumatic. No pharyngeal erythema or exudate. No stridor.   NECK: Supple. NO adenopathy or thyromegaly.   CHEST: Lungs clear to auscultation. No adventitious sounds.   CARDIOVASCULAR: Normal S1, S2. No rubs, murmurs or gallops. Brisk carotid pulses without bruits.   ABDOMEN: Bowel sounds normal. Not distended. Soft. No tenderness or masses.  PERIPHERAL VASCULAR: Femoral pulses present and symmetrical. No edema.  MUSCULOSKELETAL: Unremarkable. There is no joint erythema or warmth noted and there is normal range of motion of all major joints.  BACK: No CVA tenderness. There is no spasm, tenderness or radiculopathy noted with palpation and there is full range of motion.   NEUROLOGIC: Cranial Nerves: II-XII grossly intact.  Motor: 5/5 strength major flexors/extensors. No tremor.  Gait & Posture: Normal gait and fine motion. No cerebellar signs.  Psych: mood euthymic; insight and judgement normal  Right hip: pain with rotation    Hip and lumbar xrays reviewed      Results for orders placed or performed in visit on 03/31/23 "   POCT Bladder Scan   Result Value Ref Range    POC Residual Urine Volume 50 0 - 100 mL           Primary osteoarthritis of right hip    Depression, unspecified depression type    Balance problem      Continue home PT stretching   Aleve PRN for hip DJD  Continue wellbutrin 300mg  Continue other present meds  Counselled on regular exercise, maintenance of a healthy weight, balanced diet rich in fruits/vegetables, avoidance of unhealthy habits.  Continue present testosterone replacement and urology  follow-up  Testosterone today as planned  Continue home stretching

## 2023-04-14 ENCOUNTER — CLINICAL SUPPORT (OUTPATIENT)
Dept: CARDIOLOGY | Facility: HOSPITAL | Age: 78
End: 2023-04-14
Payer: MEDICARE

## 2023-04-14 DIAGNOSIS — Z95.0 PRESENCE OF CARDIAC PACEMAKER: ICD-10-CM

## 2023-04-14 PROCEDURE — 93296 REM INTERROG EVL PM/IDS: CPT | Mod: PO | Performed by: INTERNAL MEDICINE

## 2023-07-06 ENCOUNTER — PATIENT MESSAGE (OUTPATIENT)
Dept: FAMILY MEDICINE | Facility: CLINIC | Age: 78
End: 2023-07-06
Payer: MEDICARE

## 2023-07-06 DIAGNOSIS — M16.11 PRIMARY OSTEOARTHRITIS OF RIGHT HIP: Primary | ICD-10-CM

## 2023-07-11 ENCOUNTER — OFFICE VISIT (OUTPATIENT)
Dept: UROLOGY | Facility: CLINIC | Age: 78
End: 2023-07-11
Payer: MEDICARE

## 2023-07-11 VITALS — BODY MASS INDEX: 24.56 KG/M2 | WEIGHT: 162.06 LBS | HEIGHT: 68 IN

## 2023-07-11 DIAGNOSIS — N13.8 BENIGN PROSTATIC HYPERPLASIA WITH URINARY OBSTRUCTION: Primary | ICD-10-CM

## 2023-07-11 DIAGNOSIS — N39.41 URGENCY INCONTINENCE: ICD-10-CM

## 2023-07-11 DIAGNOSIS — N40.1 BENIGN PROSTATIC HYPERPLASIA WITH URINARY OBSTRUCTION: Primary | ICD-10-CM

## 2023-07-11 LAB
BILIRUBIN, UA POC OHS: NEGATIVE
BLOOD, UA POC OHS: NEGATIVE
CLARITY, UA POC OHS: CLEAR
COLOR, UA POC OHS: YELLOW
GLUCOSE, UA POC OHS: NEGATIVE
KETONES, UA POC OHS: ABNORMAL
LEUKOCYTES, UA POC OHS: ABNORMAL
NITRITE, UA POC OHS: NEGATIVE
PH, UA POC OHS: 6.5
POC RESIDUAL URINE VOLUME: 0 ML (ref 0–100)
PROTEIN, UA POC OHS: NEGATIVE
SPECIFIC GRAVITY, UA POC OHS: 1.02
UROBILINOGEN, UA POC OHS: 1

## 2023-07-11 PROCEDURE — 81003 POCT URINALYSIS(INSTRUMENT): ICD-10-PCS | Mod: QW,S$GLB,, | Performed by: STUDENT IN AN ORGANIZED HEALTH CARE EDUCATION/TRAINING PROGRAM

## 2023-07-11 PROCEDURE — 1159F PR MEDICATION LIST DOCUMENTED IN MEDICAL RECORD: ICD-10-PCS | Mod: CPTII,S$GLB,, | Performed by: STUDENT IN AN ORGANIZED HEALTH CARE EDUCATION/TRAINING PROGRAM

## 2023-07-11 PROCEDURE — 99214 OFFICE O/P EST MOD 30 MIN: CPT | Mod: S$GLB,,, | Performed by: STUDENT IN AN ORGANIZED HEALTH CARE EDUCATION/TRAINING PROGRAM

## 2023-07-11 PROCEDURE — 81003 URINALYSIS AUTO W/O SCOPE: CPT | Mod: QW,S$GLB,, | Performed by: STUDENT IN AN ORGANIZED HEALTH CARE EDUCATION/TRAINING PROGRAM

## 2023-07-11 PROCEDURE — 99999 PR PBB SHADOW E&M-EST. PATIENT-LVL II: CPT | Mod: PBBFAC,,, | Performed by: STUDENT IN AN ORGANIZED HEALTH CARE EDUCATION/TRAINING PROGRAM

## 2023-07-11 PROCEDURE — 1101F PT FALLS ASSESS-DOCD LE1/YR: CPT | Mod: CPTII,S$GLB,, | Performed by: STUDENT IN AN ORGANIZED HEALTH CARE EDUCATION/TRAINING PROGRAM

## 2023-07-11 PROCEDURE — 51798 POCT BLADDER SCAN: ICD-10-PCS | Mod: S$GLB,,, | Performed by: STUDENT IN AN ORGANIZED HEALTH CARE EDUCATION/TRAINING PROGRAM

## 2023-07-11 PROCEDURE — 1125F PR PAIN SEVERITY QUANTIFIED, PAIN PRESENT: ICD-10-PCS | Mod: CPTII,S$GLB,, | Performed by: STUDENT IN AN ORGANIZED HEALTH CARE EDUCATION/TRAINING PROGRAM

## 2023-07-11 PROCEDURE — 99214 PR OFFICE/OUTPT VISIT, EST, LEVL IV, 30-39 MIN: ICD-10-PCS | Mod: S$GLB,,, | Performed by: STUDENT IN AN ORGANIZED HEALTH CARE EDUCATION/TRAINING PROGRAM

## 2023-07-11 PROCEDURE — 1101F PR PT FALLS ASSESS DOC 0-1 FALLS W/OUT INJ PAST YR: ICD-10-PCS | Mod: CPTII,S$GLB,, | Performed by: STUDENT IN AN ORGANIZED HEALTH CARE EDUCATION/TRAINING PROGRAM

## 2023-07-11 PROCEDURE — 3288F FALL RISK ASSESSMENT DOCD: CPT | Mod: CPTII,S$GLB,, | Performed by: STUDENT IN AN ORGANIZED HEALTH CARE EDUCATION/TRAINING PROGRAM

## 2023-07-11 PROCEDURE — 1159F MED LIST DOCD IN RCRD: CPT | Mod: CPTII,S$GLB,, | Performed by: STUDENT IN AN ORGANIZED HEALTH CARE EDUCATION/TRAINING PROGRAM

## 2023-07-11 PROCEDURE — 99999 PR PBB SHADOW E&M-EST. PATIENT-LVL II: ICD-10-PCS | Mod: PBBFAC,,, | Performed by: STUDENT IN AN ORGANIZED HEALTH CARE EDUCATION/TRAINING PROGRAM

## 2023-07-11 PROCEDURE — 3288F PR FALLS RISK ASSESSMENT DOCUMENTED: ICD-10-PCS | Mod: CPTII,S$GLB,, | Performed by: STUDENT IN AN ORGANIZED HEALTH CARE EDUCATION/TRAINING PROGRAM

## 2023-07-11 PROCEDURE — 51798 US URINE CAPACITY MEASURE: CPT | Mod: S$GLB,,, | Performed by: STUDENT IN AN ORGANIZED HEALTH CARE EDUCATION/TRAINING PROGRAM

## 2023-07-11 PROCEDURE — 1125F AMNT PAIN NOTED PAIN PRSNT: CPT | Mod: CPTII,S$GLB,, | Performed by: STUDENT IN AN ORGANIZED HEALTH CARE EDUCATION/TRAINING PROGRAM

## 2023-07-11 RX ORDER — VIBEGRON 75 MG/1
75 TABLET, FILM COATED ORAL DAILY
Qty: 30 TABLET | Refills: 11 | Status: SHIPPED | OUTPATIENT
Start: 2023-07-11 | End: 2024-01-22

## 2023-07-11 NOTE — PROGRESS NOTES
"[facility] - Urology   Clinic Note    Subjective:     Chief Complaint: Follow-up (3 month)      History of Present Illness:  José Miguel Austin is a 78 y.o. male who presents to clinic for evaluation and management of BPH and LUTS. He is new to our clinic referred by No ref. provider found    He has LUTS. Recently started on flomax. Previous IPSS was 18/4. Prostate volume on US from 3/2023 was 37 cc. Cystoscopy demonstrated mild intravesical prostatic protrusion but bilobar enlargement.  Previous PVR was 50 cc.  PVR today is 0  IPSS Questionnaire (AUA-SS):  1) Incomplete Emptying 1 - Less than 1 time in 5   2) Frequency 2 - Less than half the time   3) Intermittency 0 - Not at all   4) Urgency 4 - More than half the time   5) Weak Stream  2 - Less than half the time   6) Straining 0 - Not at all   7) Nocturia 5 - 5+ times   Total score:   14   Quality of Life:  3 - Mixed       He was previously on testosterone but stopped it a few months ago. Most recent testosterone levels were within normal - 551 on 4/4/2023.    PSAs have been below 1 for years.     Past medical, family, surgical and social history reviewed as documented in chart with pertinent positive medical, family, surgical and social history detailed in HPI.    A review systems was conducted with pertinent positive and negative findings documented in HPI.    Anticoagulation/Antiplatelets:  No    Objective:     Estimated body mass index is 24.64 kg/m² as calculated from the following:    Height as of this encounter: 5' 8" (1.727 m).    Weight as of this encounter: 73.5 kg (162 lb 0.6 oz).    Vital Signs (Most Recent)       Physical Exam  Vitals and nursing note reviewed.   Constitutional:       General: He is not in acute distress.     Appearance: He is not ill-appearing or toxic-appearing.   Pulmonary:      Effort: Pulmonary effort is normal. No accessory muscle usage or respiratory distress.   Genitourinary:     Comments: Prostate 25 g, smooth, no " nodules.  Neurological:      Mental Status: He is alert.       Lab Results   Component Value Date    BUN 16 11/01/2022    CREATININE 1.2 11/01/2022    WBC 9.33 11/01/2022    HGB 16.5 11/01/2022    HCT 50.4 11/01/2022     11/01/2022    AST 37 11/01/2022    ALT 33 11/01/2022    ALKPHOS 86 11/01/2022    ALBUMIN 3.7 11/01/2022    HGBA1C 5.4 03/30/2021        Lab Results   Component Value Date    PSA 0.87 11/01/2022    PSA 0.93 09/30/2021    PSA 0.71 02/07/2020    PSA 0.62 12/20/2018    PSA 0.52 12/03/2018    PSA 0.94 11/21/2017    PSA 0.83 10/04/2017    PSA 0.66 03/09/2016    PSA 0.82 02/04/2016    PSA 0.50 01/20/2015    PSADIAG 0.95 04/26/2022    PSAFREE 0.13 10/04/2017    PSAFREEPCT 28.26 10/04/2017      Assessment:     1. Benign prostatic hyperplasia with urinary obstruction    2. Urgency incontinence      Plan:     We discussed the association of lower urinary tract symptoms (LUTS) and prostate enlargement (BPH). The management of BPH varies widely depending on the degree of bother, adverse events related to BPH, and the overall size of the prostate. We discussed behavioral modifications, medical management,  and outlet procedures, as well as the indications, risks, and benefits.    He has noticed improvement since starting the Flomax.  He is not interested in procedural intervention at this time.  Stop the Ditropan and start Gemtesa for the urgency and urge urinary incontinence.  Follow-up in 6 months with an IPSS.    Nathan Tyler MD

## 2023-07-13 ENCOUNTER — CLINICAL SUPPORT (OUTPATIENT)
Dept: CARDIOLOGY | Facility: HOSPITAL | Age: 78
End: 2023-07-13
Payer: MEDICARE

## 2023-07-13 DIAGNOSIS — Z95.0 PRESENCE OF CARDIAC PACEMAKER: ICD-10-CM

## 2023-07-13 PROCEDURE — 93294 REM INTERROG EVL PM/LDLS PM: CPT | Mod: ,,, | Performed by: INTERNAL MEDICINE

## 2023-07-13 PROCEDURE — 93296 REM INTERROG EVL PM/IDS: CPT | Mod: PO | Performed by: INTERNAL MEDICINE

## 2023-07-13 PROCEDURE — 93294 CARDIAC DEVICE CHECK - REMOTE: ICD-10-PCS | Mod: ,,, | Performed by: INTERNAL MEDICINE

## 2023-07-24 ENCOUNTER — OFFICE VISIT (OUTPATIENT)
Dept: ORTHOPEDICS | Facility: CLINIC | Age: 78
End: 2023-07-24
Payer: MEDICARE

## 2023-07-24 ENCOUNTER — HOSPITAL ENCOUNTER (OUTPATIENT)
Dept: RADIOLOGY | Facility: HOSPITAL | Age: 78
Discharge: HOME | End: 2023-07-24
Attending: ORTHOPAEDIC SURGERY
Payer: MEDICARE

## 2023-07-24 DIAGNOSIS — M16.11 PRIMARY OSTEOARTHRITIS OF RIGHT HIP: ICD-10-CM

## 2023-07-24 DIAGNOSIS — M16.11 PRIMARY OSTEOARTHRITIS OF RIGHT HIP: Primary | ICD-10-CM

## 2023-07-24 PROCEDURE — 1159F MED LIST DOCD IN RCRD: CPT | Mod: CPTII,S$GLB,, | Performed by: ORTHOPAEDIC SURGERY

## 2023-07-24 PROCEDURE — 73502 X-RAY EXAM HIP UNI 2-3 VIEWS: CPT | Mod: 26,RT,, | Performed by: RADIOLOGY

## 2023-07-24 PROCEDURE — 1160F RVW MEDS BY RX/DR IN RCRD: CPT | Mod: CPTII,S$GLB,, | Performed by: ORTHOPAEDIC SURGERY

## 2023-07-24 PROCEDURE — 99999 PR PBB SHADOW E&M-EST. PATIENT-LVL II: ICD-10-PCS | Mod: PBBFAC,,, | Performed by: ORTHOPAEDIC SURGERY

## 2023-07-24 PROCEDURE — 99204 PR OFFICE/OUTPT VISIT, NEW, LEVL IV, 45-59 MIN: ICD-10-PCS | Mod: S$GLB,,, | Performed by: ORTHOPAEDIC SURGERY

## 2023-07-24 PROCEDURE — 73502 X-RAY EXAM HIP UNI 2-3 VIEWS: CPT | Mod: TC,PO,RT

## 2023-07-24 PROCEDURE — 99999 PR PBB SHADOW E&M-EST. PATIENT-LVL II: CPT | Mod: PBBFAC,,, | Performed by: ORTHOPAEDIC SURGERY

## 2023-07-24 PROCEDURE — 99204 OFFICE O/P NEW MOD 45 MIN: CPT | Mod: S$GLB,,, | Performed by: ORTHOPAEDIC SURGERY

## 2023-07-24 PROCEDURE — 1160F PR REVIEW ALL MEDS BY PRESCRIBER/CLIN PHARMACIST DOCUMENTED: ICD-10-PCS | Mod: CPTII,S$GLB,, | Performed by: ORTHOPAEDIC SURGERY

## 2023-07-24 PROCEDURE — 73502 XR HIP WITH PELVIS WHEN PERFORMED, 2 OR 3  VIEWS RIGHT: ICD-10-PCS | Mod: 26,RT,, | Performed by: RADIOLOGY

## 2023-07-24 PROCEDURE — 1159F PR MEDICATION LIST DOCUMENTED IN MEDICAL RECORD: ICD-10-PCS | Mod: CPTII,S$GLB,, | Performed by: ORTHOPAEDIC SURGERY

## 2023-07-24 NOTE — PROGRESS NOTES
78 years old pain in the right hip for about 5 months time no trauma 6 on good days 8 on bad days anti-inflammatories provide partial relief where his walking seems to make it worse    Exam shows an antalgic gait, internal external rotation of the right hip reproduces symptoms well-perfused distally     X-rays show arthritic changes of the right hip which have progressed since his last x-ray 5 months ago degenerative changes noted lumbar spine     Assessment: Right hip arthrosis, lumbar spine arthrosis    Plan:  We will schedule the patient for fluoroscopic guided Kenalog injection into the right hip, we did discuss with him the possibility of hip replacement surgery, follow-up as needed

## 2023-07-28 ENCOUNTER — PATIENT MESSAGE (OUTPATIENT)
Dept: RADIOLOGY | Facility: HOSPITAL | Age: 78
End: 2023-07-28
Payer: MEDICARE

## 2023-08-01 ENCOUNTER — HOSPITAL ENCOUNTER (OUTPATIENT)
Dept: RADIOLOGY | Facility: HOSPITAL | Age: 78
Discharge: HOME | End: 2023-08-01
Attending: INTERNAL MEDICINE
Payer: MEDICARE

## 2023-08-01 ENCOUNTER — CLINICAL SUPPORT (OUTPATIENT)
Dept: CARDIOLOGY | Facility: HOSPITAL | Age: 78
End: 2023-08-01
Attending: INTERNAL MEDICINE
Payer: MEDICARE

## 2023-08-01 VITALS — WEIGHT: 162 LBS | HEIGHT: 68 IN | BODY MASS INDEX: 24.55 KG/M2

## 2023-08-01 DIAGNOSIS — Z95.0 PACEMAKER: ICD-10-CM

## 2023-08-01 DIAGNOSIS — I10 PRIMARY HYPERTENSION: ICD-10-CM

## 2023-08-01 DIAGNOSIS — I49.5 SSS (SICK SINUS SYNDROME): ICD-10-CM

## 2023-08-01 DIAGNOSIS — Z95.5 S/P CORONARY ARTERY STENT PLACEMENT: ICD-10-CM

## 2023-08-01 DIAGNOSIS — I25.10 CORONARY ARTERY DISEASE INVOLVING NATIVE CORONARY ARTERY OF NATIVE HEART WITHOUT ANGINA PECTORIS: ICD-10-CM

## 2023-08-01 LAB
CV PHARM DOSE: 0.4 MG
CV STRESS BASE HR: 62 BPM
DIASTOLIC BLOOD PRESSURE: 86 MMHG
NUC REST EJECTION FRACTION: 59
OHS CV CPX 1 MINUTE RECOVERY HEART RATE: 75 BPM
OHS CV CPX 85 PERCENT MAX PREDICTED HEART RATE MALE: 119
OHS CV CPX MAX PREDICTED HEART RATE: 140
OHS CV CPX PATIENT IS FEMALE: 0
OHS CV CPX PATIENT IS MALE: 1
OHS CV CPX PEAK DIASTOLIC BLOOD PRESSURE: 79 MMHG
OHS CV CPX PEAK HEAR RATE: 77 BPM
OHS CV CPX PEAK RATE PRESSURE PRODUCT: NORMAL
OHS CV CPX PEAK SYSTOLIC BLOOD PRESSURE: 171 MMHG
OHS CV CPX PERCENT MAX PREDICTED HEART RATE ACHIEVED: 55
OHS CV CPX RATE PRESSURE PRODUCT PRESENTING: 9982
OHS CV PHARM TIME: 937 MIN
SYSTOLIC BLOOD PRESSURE: 161 MMHG

## 2023-08-01 PROCEDURE — A9502 TC99M TETROFOSMIN: HCPCS | Mod: PO

## 2023-08-01 PROCEDURE — 93017 CV STRESS TEST TRACING ONLY: CPT | Mod: PO

## 2023-08-01 PROCEDURE — 63600175 PHARM REV CODE 636 W HCPCS: Mod: PO | Performed by: INTERNAL MEDICINE

## 2023-08-01 PROCEDURE — 78452 HT MUSCLE IMAGE SPECT MULT: CPT | Mod: PO

## 2023-08-01 PROCEDURE — 93306 TTE W/DOPPLER COMPLETE: CPT | Mod: 26,,, | Performed by: INTERNAL MEDICINE

## 2023-08-01 PROCEDURE — 78452 NUCLEAR STRESS - CARDIOLOGY INTERPRETED (CUPID ONLY): ICD-10-PCS | Mod: 26,,, | Performed by: INTERNAL MEDICINE

## 2023-08-01 PROCEDURE — 93016 NUCLEAR STRESS - CARDIOLOGY INTERPRETED (CUPID ONLY): ICD-10-PCS | Mod: ,,, | Performed by: INTERNAL MEDICINE

## 2023-08-01 PROCEDURE — 93016 CV STRESS TEST SUPVJ ONLY: CPT | Mod: ,,, | Performed by: INTERNAL MEDICINE

## 2023-08-01 PROCEDURE — 93306 TTE W/DOPPLER COMPLETE: CPT | Mod: PO

## 2023-08-01 PROCEDURE — 93018 CV STRESS TEST I&R ONLY: CPT | Mod: ,,, | Performed by: INTERNAL MEDICINE

## 2023-08-01 PROCEDURE — 93018 PR CARDIAC STRESS TST,INTERP/REPT ONLY: ICD-10-PCS | Mod: ,,, | Performed by: INTERNAL MEDICINE

## 2023-08-01 PROCEDURE — 78452 HT MUSCLE IMAGE SPECT MULT: CPT | Mod: 26,,, | Performed by: INTERNAL MEDICINE

## 2023-08-01 PROCEDURE — 93306 ECHO (CUPID ONLY): ICD-10-PCS | Mod: 26,,, | Performed by: INTERNAL MEDICINE

## 2023-08-01 RX ORDER — REGADENOSON 0.08 MG/ML
0.4 INJECTION, SOLUTION INTRAVENOUS
Status: COMPLETED | OUTPATIENT
Start: 2023-08-01 | End: 2023-08-01

## 2023-08-01 RX ADMIN — REGADENOSON 0.4 MG: 0.08 INJECTION, SOLUTION INTRAVENOUS at 09:08

## 2023-08-02 LAB
ASCENDING AORTA: 2.43 CM
AV INDEX (PROSTH): 0.82
AV MEAN GRADIENT: 3 MMHG
AV PEAK GRADIENT: 5 MMHG
AV REGURGITATION PRESSURE HALF TIME: 361.34 MS
AV VALVE AREA BY VELOCITY RATIO: 3.03 CM²
AV VALVE AREA: 3.09 CM²
AV VELOCITY RATIO: 0.81
BSA FOR ECHO PROCEDURE: 1.88 M2
CV ECHO LV RWT: 0.3 CM
DOP CALC AO PEAK VEL: 1.08 M/S
DOP CALC AO VTI: 24.6 CM
DOP CALC LVOT AREA: 3.8 CM2
DOP CALC LVOT DIAMETER: 2.19 CM
DOP CALC LVOT PEAK VEL: 0.87 M/S
DOP CALC LVOT STROKE VOLUME: 76.05 CM3
DOP CALCLVOT PEAK VEL VTI: 20.2 CM
ECHO LV POSTERIOR WALL: 0.69 CM (ref 0.6–1.1)
FRACTIONAL SHORTENING: 20 % (ref 28–44)
INTERVENTRICULAR SEPTUM: 0.97 CM (ref 0.6–1.1)
IVRT: 79.92 MSEC
LA MAJOR: 4.92 CM
LA MINOR: 5.09 CM
LEFT ATRIUM SIZE: 3.5 CM
LEFT INTERNAL DIMENSION IN SYSTOLE: 3.72 CM (ref 2.1–4)
LEFT VENTRICLE DIASTOLIC VOLUME INDEX: 53.45 ML/M2
LEFT VENTRICLE DIASTOLIC VOLUME: 99.96 ML
LEFT VENTRICLE MASS INDEX: 67 G/M2
LEFT VENTRICLE SYSTOLIC VOLUME INDEX: 31.4 ML/M2
LEFT VENTRICLE SYSTOLIC VOLUME: 58.73 ML
LEFT VENTRICULAR INTERNAL DIMENSION IN DIASTOLE: 4.65 CM (ref 3.5–6)
LEFT VENTRICULAR MASS: 125.98 G
LVOT MG: 1.3 MMHG
LVOT MV: 0.52 CM/S
PISA AR MAX VEL: 4.07 M/S
PISA TR MAX VEL: 2.84 M/S
PULM VEIN S/D RATIO: 0.53
PV PEAK D VEL: 0.58 M/S
PV PEAK S VEL: 0.31 M/S
RA MAJOR: 4.8 CM
RA PRESSURE ESTIMATED: 8 MMHG
RIGHT VENTRICULAR END-DIASTOLIC DIMENSION: 3.37 CM
RIGHT VENTRICULAR LENGTH IN DIASTOLE (APICAL 4-CHAMBER VIEW): 5.25 CM
RV MID DIAMA: 2.39 CM
RV TB RVSP: 11 MMHG
RV TISSUE DOPPLER FREE WALL SYSTOLIC VELOCITY 1 (APICAL 4 CHAMBER VIEW): 9.28 CM/S
SINUS: 3.01 CM
STJ: 2.74 CM
TDI LATERAL: 0.09 M/S
TDI SEPTAL: 0.06 M/S
TDI: 0.08 M/S
TR MAX PG: 32 MMHG
TRICUSPID ANNULAR PLANE SYSTOLIC EXCURSION: 2.1 CM
TV REST PULMONARY ARTERY PRESSURE: 40 MMHG
Z-SCORE OF LEFT VENTRICULAR DIMENSION IN END DIASTOLE: -1.14
Z-SCORE OF LEFT VENTRICULAR DIMENSION IN END SYSTOLE: 1.15

## 2023-08-03 ENCOUNTER — HOSPITAL ENCOUNTER (OUTPATIENT)
Dept: RADIOLOGY | Facility: HOSPITAL | Age: 78
Discharge: HOME | End: 2023-08-03
Attending: ORTHOPAEDIC SURGERY
Payer: MEDICARE

## 2023-08-03 DIAGNOSIS — M25.551 RIGHT HIP PAIN: ICD-10-CM

## 2023-08-03 PROCEDURE — 63600175 PHARM REV CODE 636 W HCPCS: Mod: PO | Performed by: ORTHOPAEDIC SURGERY

## 2023-08-03 PROCEDURE — 20610 DRAIN/INJ JOINT/BURSA W/O US: CPT | Mod: RT,,, | Performed by: RADIOLOGY

## 2023-08-03 PROCEDURE — 25500020 PHARM REV CODE 255: Mod: PO | Performed by: ORTHOPAEDIC SURGERY

## 2023-08-03 PROCEDURE — 20610 IR ASPIRATION INJECTION LARGE JOINT W FLUORO: ICD-10-PCS | Mod: RT,,, | Performed by: RADIOLOGY

## 2023-08-03 PROCEDURE — 77002 IR ASPIRATION INJECTION LARGE JOINT W FLUORO: ICD-10-PCS | Mod: 26,RT,, | Performed by: RADIOLOGY

## 2023-08-03 PROCEDURE — 77002 NEEDLE LOCALIZATION BY XRAY: CPT | Mod: 26,RT,, | Performed by: RADIOLOGY

## 2023-08-03 PROCEDURE — 20610 DRAIN/INJ JOINT/BURSA W/O US: CPT | Mod: PO

## 2023-08-03 RX ORDER — BUPIVACAINE HYDROCHLORIDE 2.5 MG/ML
10 INJECTION, SOLUTION EPIDURAL; INFILTRATION; INTRACAUDAL ONCE
Status: COMPLETED | OUTPATIENT
Start: 2023-08-03 | End: 2023-08-03

## 2023-08-03 RX ORDER — TRIAMCINOLONE ACETONIDE 40 MG/ML
40 INJECTION, SUSPENSION INTRA-ARTICULAR; INTRAMUSCULAR ONCE
Status: COMPLETED | OUTPATIENT
Start: 2023-08-03 | End: 2023-08-03

## 2023-08-03 RX ADMIN — BUPIVACAINE HYDROCHLORIDE 25 MG: 2.5 INJECTION, SOLUTION EPIDURAL; INFILTRATION; INTRACAUDAL; PERINEURAL at 11:08

## 2023-08-03 RX ADMIN — TRIAMCINOLONE ACETONIDE 40 MG: 40 INJECTION, SUSPENSION INTRA-ARTICULAR; INTRAMUSCULAR at 11:08

## 2023-08-03 RX ADMIN — IOHEXOL 10 ML: 240 INJECTION, SOLUTION INTRATHECAL; INTRAVASCULAR; INTRAVENOUS; ORAL at 11:08

## 2023-08-08 ENCOUNTER — OFFICE VISIT (OUTPATIENT)
Dept: CARDIOLOGY | Facility: CLINIC | Age: 78
End: 2023-08-08
Payer: MEDICARE

## 2023-08-08 VITALS
WEIGHT: 163.38 LBS | DIASTOLIC BLOOD PRESSURE: 88 MMHG | BODY MASS INDEX: 24.76 KG/M2 | HEART RATE: 72 BPM | HEIGHT: 68 IN | SYSTOLIC BLOOD PRESSURE: 174 MMHG

## 2023-08-08 DIAGNOSIS — Z95.0 PACEMAKER: ICD-10-CM

## 2023-08-08 DIAGNOSIS — I49.5 SSS (SICK SINUS SYNDROME): Primary | ICD-10-CM

## 2023-08-08 DIAGNOSIS — Z95.5 S/P CORONARY ARTERY STENT PLACEMENT: ICD-10-CM

## 2023-08-08 DIAGNOSIS — I25.10 CORONARY ARTERY DISEASE INVOLVING NATIVE CORONARY ARTERY OF NATIVE HEART WITHOUT ANGINA PECTORIS: ICD-10-CM

## 2023-08-08 DIAGNOSIS — I10 PRIMARY HYPERTENSION: ICD-10-CM

## 2023-08-08 PROCEDURE — 3288F PR FALLS RISK ASSESSMENT DOCUMENTED: ICD-10-PCS | Mod: CPTII,S$GLB,, | Performed by: INTERNAL MEDICINE

## 2023-08-08 PROCEDURE — 99999 PR PBB SHADOW E&M-EST. PATIENT-LVL III: CPT | Mod: PBBFAC,,, | Performed by: INTERNAL MEDICINE

## 2023-08-08 PROCEDURE — 1159F MED LIST DOCD IN RCRD: CPT | Mod: CPTII,S$GLB,, | Performed by: INTERNAL MEDICINE

## 2023-08-08 PROCEDURE — 3077F PR MOST RECENT SYSTOLIC BLOOD PRESSURE >= 140 MM HG: ICD-10-PCS | Mod: CPTII,S$GLB,, | Performed by: INTERNAL MEDICINE

## 2023-08-08 PROCEDURE — 3077F SYST BP >= 140 MM HG: CPT | Mod: CPTII,S$GLB,, | Performed by: INTERNAL MEDICINE

## 2023-08-08 PROCEDURE — 1101F PT FALLS ASSESS-DOCD LE1/YR: CPT | Mod: CPTII,S$GLB,, | Performed by: INTERNAL MEDICINE

## 2023-08-08 PROCEDURE — 3288F FALL RISK ASSESSMENT DOCD: CPT | Mod: CPTII,S$GLB,, | Performed by: INTERNAL MEDICINE

## 2023-08-08 PROCEDURE — 1126F PR PAIN SEVERITY QUANTIFIED, NO PAIN PRESENT: ICD-10-PCS | Mod: CPTII,S$GLB,, | Performed by: INTERNAL MEDICINE

## 2023-08-08 PROCEDURE — 3079F DIAST BP 80-89 MM HG: CPT | Mod: CPTII,S$GLB,, | Performed by: INTERNAL MEDICINE

## 2023-08-08 PROCEDURE — 1101F PR PT FALLS ASSESS DOC 0-1 FALLS W/OUT INJ PAST YR: ICD-10-PCS | Mod: CPTII,S$GLB,, | Performed by: INTERNAL MEDICINE

## 2023-08-08 PROCEDURE — 99214 PR OFFICE/OUTPT VISIT, EST, LEVL IV, 30-39 MIN: ICD-10-PCS | Mod: S$GLB,,, | Performed by: INTERNAL MEDICINE

## 2023-08-08 PROCEDURE — 99999 PR PBB SHADOW E&M-EST. PATIENT-LVL III: ICD-10-PCS | Mod: PBBFAC,,, | Performed by: INTERNAL MEDICINE

## 2023-08-08 PROCEDURE — 99214 OFFICE O/P EST MOD 30 MIN: CPT | Mod: S$GLB,,, | Performed by: INTERNAL MEDICINE

## 2023-08-08 PROCEDURE — 1159F PR MEDICATION LIST DOCUMENTED IN MEDICAL RECORD: ICD-10-PCS | Mod: CPTII,S$GLB,, | Performed by: INTERNAL MEDICINE

## 2023-08-08 PROCEDURE — 1126F AMNT PAIN NOTED NONE PRSNT: CPT | Mod: CPTII,S$GLB,, | Performed by: INTERNAL MEDICINE

## 2023-08-08 PROCEDURE — 3079F PR MOST RECENT DIASTOLIC BLOOD PRESSURE 80-89 MM HG: ICD-10-PCS | Mod: CPTII,S$GLB,, | Performed by: INTERNAL MEDICINE

## 2023-08-08 PROCEDURE — 1160F RVW MEDS BY RX/DR IN RCRD: CPT | Mod: CPTII,S$GLB,, | Performed by: INTERNAL MEDICINE

## 2023-08-08 PROCEDURE — 1160F PR REVIEW ALL MEDS BY PRESCRIBER/CLIN PHARMACIST DOCUMENTED: ICD-10-PCS | Mod: CPTII,S$GLB,, | Performed by: INTERNAL MEDICINE

## 2023-08-08 RX ORDER — TELMISARTAN 20 MG/1
20 TABLET ORAL DAILY
Qty: 90 TABLET | Refills: 3 | Status: SHIPPED | OUTPATIENT
Start: 2023-08-08 | End: 2024-08-07

## 2023-08-08 NOTE — PROGRESS NOTES
Subjective:    Patient ID:  José Miguel Austin is a 78 y.o. male who presents for follow-up of CAD and sick sinus syndrome    HPI  He comes for follow up with no major problems, no chest pain, no shortness of breath.  Blood pressure elevated at home lately.  Thinking of having right hip surgery later this year    Review of Systems   Constitutional: Negative for decreased appetite, malaise/fatigue, weight gain and weight loss.   Cardiovascular:  Negative for chest pain, dyspnea on exertion, leg swelling, palpitations and syncope.   Respiratory:  Negative for cough and shortness of breath.    Gastrointestinal: Negative.    Neurological:  Negative for weakness.   All other systems reviewed and are negative.     Objective:      Physical Exam  Vitals and nursing note reviewed.   Constitutional:       Appearance: Normal appearance. He is well-developed.   HENT:      Head: Normocephalic.   Eyes:      Pupils: Pupils are equal, round, and reactive to light.   Neck:      Thyroid: No thyromegaly.      Vascular: No carotid bruit or JVD.   Cardiovascular:      Rate and Rhythm: Normal rate and regular rhythm.      Chest Wall: PMI is not displaced.      Pulses: Normal pulses and intact distal pulses.      Heart sounds: Normal heart sounds. No murmur heard.     No gallop.   Pulmonary:      Effort: Pulmonary effort is normal.      Breath sounds: Normal breath sounds.   Abdominal:      Palpations: Abdomen is soft. There is no mass.      Tenderness: There is no abdominal tenderness.   Musculoskeletal:         General: Normal range of motion.      Cervical back: Normal range of motion and neck supple.   Skin:     General: Skin is warm.   Neurological:      Mental Status: He is alert and oriented to person, place, and time.      Sensory: No sensory deficit.      Deep Tendon Reflexes: Reflexes are normal and symmetric.             Most Recent Echocardiogram Results  Results for orders placed in visit on 08/01/23    Echo    Interpretation  Summary    Left Ventricle: The left ventricle is normal in size. Normal wall thickness. Normal wall motion. There is low normal systolic function with a visually estimated ejection fraction of 50 - 55%. There is normal diastolic function.    Right Ventricle: Normal right ventricular cavity size. Systolic function is normal.    Aortic Valve: There is mild aortic regurgitation.    Mitral Valve: There is mild bileaflet sclerosis. There is mild regurgitation.    IVC/SVC: Intermediate venous pressure at 8 mmHg.    The estimated PA systolic pressure is at least 32 mmHg.    Pericardium: There is no pericardial effusion.      Most Recent Nuclear Stress Test Results  Results for orders placed during the hospital encounter of 08/01/23    Nuclear Stress - Cardiology Interpreted    Interpretation Summary    Normal myocardial perfusion scan. There is no evidence of myocardial ischemia or infarction.    There is a  moderate intensity fixed perfusion abnormality in the inferior wall of the left ventricle secondary to diaphragm attenuation.    The gated perfusion images showed an ejection fraction of 59% at rest.    There is normal wall motion at rest.    The ECG portion of the study is negative for ischemia.    The patient reported no chest pain during the stress test.    When compared to the previous study from 10/1/2020, there are no significant changes.      Most Recent Cardiac PET Stress Test Results  No results found for this or any previous visit.      Most Recent Cardiovascular Angiogram results  No results found for this or any previous visit.      Other Most Recent Cardiology Results  Results for orders placed in visit on 07/13/23    Cardiac device check - Remote    Narrative  Battery and Leads (BL)  Normal parameters noted on battery and lead(s)    Presenting Rhythm (OH)  Atrial Sensing-Ventricular Sensing (AS-VS)    Arrhythmic events (AE)  No new arrhythmic events in monitoring period    Cardiovascular Physiologic  Parameters (CPP)  No abnormalities in physiologic parameters identified    Transmission Information (TI)  Device Summary Report    Follow Up (FU)  Continue remote monitoring with quarterly reporting    Additional Comments  Pacemaker Report  Monitoring period: 2/18/23-5/20/23    Battery/Lead Status:3.8-4.5yrs    Ap: 8.7%  : <1%      Assessment:       1. SSS (sick sinus syndrome)    2. S/P coronary artery stent placement    3. Pacemaker    4. Coronary artery disease involving native coronary artery of native heart without angina pectoris    5. Primary hypertension         Plan:   Add telmisartan 20 mg p.o. q.day  Continue amlodipine  Regular exercise program  One year follow-up

## 2023-08-10 ENCOUNTER — PATIENT MESSAGE (OUTPATIENT)
Dept: ADMINISTRATIVE | Facility: HOSPITAL | Age: 78
End: 2023-08-10
Payer: MEDICARE

## 2023-08-10 ENCOUNTER — PATIENT OUTREACH (OUTPATIENT)
Dept: ADMINISTRATIVE | Facility: HOSPITAL | Age: 78
End: 2023-08-10
Payer: MEDICARE

## 2023-08-10 NOTE — PROGRESS NOTES
Uncontrolled BP REPORT  BP Readings from Last 3 Encounters:   08/08/23 (!) 174/88   04/04/23 128/64   02/01/23 (!) 187/77       Non-compliant report chart audits for HYPERTENSION MANAGEMENT     Outreach to patient in reference to hypertension management       NEED REMOTE HOME BP READING DOCUMENTED   OR  BP FOLLOW UP WITH NURSE VISIT OR CARE TEAM MEMBER

## 2023-08-25 DIAGNOSIS — F32.A DEPRESSION, UNSPECIFIED DEPRESSION TYPE: ICD-10-CM

## 2023-08-25 NOTE — TELEPHONE ENCOUNTER
No care due was identified.  [facility] Embedded Care Due Messages. Reference number: [id number].   8/25/2023 11:34:40 AM CDT

## 2023-08-25 NOTE — TELEPHONE ENCOUNTER
Refill Routing Note   Medication(s) are not appropriate for processing by [facility] for the following reason(s):      Required vitals abnormal    [facility] action(s):  Defer Care Due:  None identified            Appointments  past 12m or future 3m with PCP    Date Provider   Last Visit   4/4/2023 Matias Dickey MD   Next Visit   Visit date not found Matias Dickey MD   ED visits in past 90 days: 0        Note composed:1:02 PM 08/25/2023

## 2023-08-27 RX ORDER — BUPROPION HYDROCHLORIDE 300 MG/1
300 TABLET ORAL
Qty: 90 TABLET | Refills: 3 | Status: SHIPPED | OUTPATIENT
Start: 2023-08-27

## 2023-08-28 ENCOUNTER — TELEPHONE (OUTPATIENT)
Dept: ADMINISTRATIVE | Facility: CLINIC | Age: 78
End: 2023-08-28
Payer: MEDICARE

## 2023-09-19 ENCOUNTER — PATIENT MESSAGE (OUTPATIENT)
Dept: ADMINISTRATIVE | Facility: HOSPITAL | Age: 78
End: 2023-09-19
Payer: MEDICARE

## 2023-09-21 ENCOUNTER — TELEPHONE (OUTPATIENT)
Dept: CARDIOLOGY | Facility: CLINIC | Age: 78
End: 2023-09-21
Payer: MEDICARE

## 2023-09-21 NOTE — TELEPHONE ENCOUNTER
Pt scheduled for right total hip replacement. Please advise.     [facility]  P- [phone]  F- [phone]

## 2023-09-25 ENCOUNTER — TELEPHONE (OUTPATIENT)
Dept: FAMILY MEDICINE | Facility: CLINIC | Age: 78
End: 2023-09-25
Payer: MEDICARE

## 2023-09-25 NOTE — TELEPHONE ENCOUNTER
Preop clearance paperwork received by his orthopedist for upcoming hip replacement. Find out from patient if they are doing any preop labs or an EKG. The patient has a pacemaker and is on blood pressure medication, so he needs an appointment for this preop clearance.

## 2023-09-26 NOTE — TELEPHONE ENCOUNTER
Spoke with pt , L/M with Jazlyn at [facility] that we need labs and EKG for Dr Dickey to clear pt for surgery on Monday 10/2/23

## 2023-09-29 ENCOUNTER — TELEPHONE (OUTPATIENT)
Dept: CARDIOLOGY | Facility: HOSPITAL | Age: 78
End: 2023-09-29
Payer: MEDICARE

## 2023-09-29 DIAGNOSIS — I49.5 SSS (SICK SINUS SYNDROME): Primary | ICD-10-CM

## 2023-09-29 DIAGNOSIS — Z95.818 PRESENCE OF OTHER CARDIAC IMPLANTS AND GRAFTS: ICD-10-CM

## 2023-09-29 NOTE — TELEPHONE ENCOUNTER
9/28 called pt. To discuss disconnected home monitor, pt. States he is upstairs and is using a walker now. He is going to have hip surgery. Notified pt. I would call tomorrow morning to follow up      9/29- HM connected. Pt. Reports he had a cleaning and they disconnected everything. Notified pt. He was due for is in clinic in October. He said he has a lot going on. I scheduled appt. To coordinated with Urology appt. On 1/16. Pt. Accepted date and time and instructed pt. If he has a sooner appt. In the area to call clinic and we will reschedule. Pt. Verbalized understanding

## 2023-10-04 RX ORDER — AMLODIPINE BESYLATE 5 MG/1
5 TABLET ORAL DAILY
Qty: 30 TABLET | Refills: 11 | Status: SHIPPED | OUTPATIENT
Start: 2023-10-04 | End: 2024-10-03

## 2023-10-04 NOTE — TELEPHONE ENCOUNTER
----- Message from Brad Aponte sent at 10/4/2023  9:27 AM CDT -----  Contact: daughter  Type:  RX Refill Request    Who Called:  Anyi MICHAUD (daughter)  Refill or New Rx:  refill  RX Name and Strength:  amLODIPine (NORVASC) 5 MG tablet  How is the patient currently taking it? (ex. 1XDay):  as rx'd  Is this a 30 day or 90 day RX:    Preferred Pharmacy with phone number:    [id number] - [place] - [facility] [address] [facility] [address]  [facility]  [place] [address]  Phone: [phone] Fax: [phone]  Local or Mail Order:  local  Ordering Provider:  Stephan Mitchell Call Back Number:  [phone]  Additional Information:  Pt is out of rx  Please call once filled  Thanks

## 2023-10-11 ENCOUNTER — CLINICAL SUPPORT (OUTPATIENT)
Dept: CARDIOLOGY | Facility: HOSPITAL | Age: 78
End: 2023-10-11
Payer: MEDICARE

## 2023-10-11 DIAGNOSIS — Z95.0 PRESENCE OF CARDIAC PACEMAKER: ICD-10-CM

## 2023-10-11 PROCEDURE — 93296 REM INTERROG EVL PM/IDS: CPT | Mod: PO | Performed by: INTERNAL MEDICINE

## 2023-11-27 ENCOUNTER — LAB VISIT (OUTPATIENT)
Dept: LAB | Facility: HOSPITAL | Age: 78
End: 2023-11-27
Attending: INTERNAL MEDICINE
Payer: MEDICARE

## 2023-11-27 DIAGNOSIS — R53.81 MALAISE: ICD-10-CM

## 2023-11-27 DIAGNOSIS — M79.10 MYALGIA: ICD-10-CM

## 2023-11-27 DIAGNOSIS — Z12.5 SPECIAL SCREENING FOR MALIGNANT NEOPLASM OF PROSTATE: ICD-10-CM

## 2023-11-27 DIAGNOSIS — E55.9 HYPOVITAMINOSIS D: ICD-10-CM

## 2023-11-27 DIAGNOSIS — E53.8 FOLATE DEFICIENCY: Primary | ICD-10-CM

## 2023-11-27 DIAGNOSIS — E29.1 ANDROGEN DEFICIENCY: ICD-10-CM

## 2023-11-27 LAB
ALBUMIN SERPL BCP-MCNC: 3.4 G/DL (ref 3.5–5.2)
ALP SERPL-CCNC: 98 U/L (ref 55–135)
ALT SERPL W/O P-5'-P-CCNC: 22 U/L (ref 10–44)
ANION GAP SERPL CALC-SCNC: 9 MMOL/L (ref 8–16)
AST SERPL-CCNC: 25 U/L (ref 10–40)
BASOPHILS # BLD AUTO: 0.09 K/UL (ref 0–0.2)
BASOPHILS NFR BLD: 0.7 % (ref 0–1.9)
BILIRUB SERPL-MCNC: 0.7 MG/DL (ref 0.1–1)
BUN SERPL-MCNC: 20 MG/DL (ref 8–23)
CALCIUM SERPL-MCNC: 9.2 MG/DL (ref 8.7–10.5)
CHLORIDE SERPL-SCNC: 103 MMOL/L (ref 95–110)
CHOLEST SERPL-MCNC: 127 MG/DL (ref 120–199)
CHOLEST/HDLC SERPL: 2.6 {RATIO} (ref 2–5)
CO2 SERPL-SCNC: 25 MMOL/L (ref 23–29)
COMPLEXED PSA SERPL-MCNC: 1.4 NG/ML (ref 0–4)
CREAT SERPL-MCNC: 1.1 MG/DL (ref 0.5–1.4)
CRP SERPL-MCNC: 11.1 MG/L (ref 0–8.2)
DIFFERENTIAL METHOD: ABNORMAL
EOSINOPHIL # BLD AUTO: 0.3 K/UL (ref 0–0.5)
EOSINOPHIL NFR BLD: 2.5 % (ref 0–8)
ERYTHROCYTE [DISTWIDTH] IN BLOOD BY AUTOMATED COUNT: 14 % (ref 11.5–14.5)
ERYTHROCYTE [SEDIMENTATION RATE] IN BLOOD BY WESTERGREN METHOD: 2 MM/HR (ref 0–10)
EST. GFR  (NO RACE VARIABLE): >60 ML/MIN/1.73 M^2
GLUCOSE SERPL-MCNC: 92 MG/DL (ref 70–110)
HCT VFR BLD AUTO: 48.4 % (ref 40–54)
HDLC SERPL-MCNC: 49 MG/DL (ref 40–75)
HDLC SERPL: 38.6 % (ref 20–50)
HGB BLD-MCNC: 15.9 G/DL (ref 14–18)
IMM GRANULOCYTES # BLD AUTO: 0.06 K/UL (ref 0–0.04)
IMM GRANULOCYTES NFR BLD AUTO: 0.4 % (ref 0–0.5)
LDLC SERPL CALC-MCNC: 70.6 MG/DL (ref 63–159)
LH SERPL-ACNC: <0.2 MIU/ML (ref 0.6–12.1)
LYMPHOCYTES # BLD AUTO: 2.2 K/UL (ref 1–4.8)
LYMPHOCYTES NFR BLD: 16.6 % (ref 18–48)
MCH RBC QN AUTO: 32.4 PG (ref 27–31)
MCHC RBC AUTO-ENTMCNC: 32.9 G/DL (ref 32–36)
MCV RBC AUTO: 99 FL (ref 82–98)
MONOCYTES # BLD AUTO: 1.9 K/UL (ref 0.3–1)
MONOCYTES NFR BLD: 13.9 % (ref 4–15)
NEUTROPHILS # BLD AUTO: 8.9 K/UL (ref 1.8–7.7)
NEUTROPHILS NFR BLD: 65.9 % (ref 38–73)
NONHDLC SERPL-MCNC: 78 MG/DL
NRBC BLD-RTO: 0 /100 WBC
PLATELET # BLD AUTO: 306 K/UL (ref 150–450)
PMV BLD AUTO: 10.7 FL (ref 9.2–12.9)
POTASSIUM SERPL-SCNC: 4.3 MMOL/L (ref 3.5–5.1)
PROT SERPL-MCNC: 7.1 G/DL (ref 6–8.4)
RBC # BLD AUTO: 4.9 M/UL (ref 4.6–6.2)
SODIUM SERPL-SCNC: 137 MMOL/L (ref 136–145)
T3FREE SERPL-MCNC: 2.6 PG/ML (ref 2.3–4.2)
T4 FREE SERPL-MCNC: 0.88 NG/DL (ref 0.71–1.51)
TESTOST SERPL-MCNC: 416 NG/DL (ref 304–1227)
TRIGL SERPL-MCNC: 37 MG/DL (ref 30–150)
TSH SERPL DL<=0.005 MIU/L-ACNC: 2.32 UIU/ML (ref 0.4–4)
WBC # BLD AUTO: 13.46 K/UL (ref 3.9–12.7)

## 2023-11-27 PROCEDURE — 84481 FREE ASSAY (FT-3): CPT | Performed by: INTERNAL MEDICINE

## 2023-11-27 PROCEDURE — 36415 COLL VENOUS BLD VENIPUNCTURE: CPT | Mod: PO | Performed by: INTERNAL MEDICINE

## 2023-11-27 PROCEDURE — 84403 ASSAY OF TOTAL TESTOSTERONE: CPT | Performed by: INTERNAL MEDICINE

## 2023-11-27 PROCEDURE — 84153 ASSAY OF PSA TOTAL: CPT | Performed by: INTERNAL MEDICINE

## 2023-11-27 PROCEDURE — 82306 VITAMIN D 25 HYDROXY: CPT | Performed by: INTERNAL MEDICINE

## 2023-11-27 PROCEDURE — 84402 ASSAY OF FREE TESTOSTERONE: CPT | Performed by: INTERNAL MEDICINE

## 2023-11-27 PROCEDURE — 80053 COMPREHEN METABOLIC PANEL: CPT | Performed by: INTERNAL MEDICINE

## 2023-11-27 PROCEDURE — 84443 ASSAY THYROID STIM HORMONE: CPT | Performed by: INTERNAL MEDICINE

## 2023-11-27 PROCEDURE — 86038 ANTINUCLEAR ANTIBODIES: CPT | Performed by: INTERNAL MEDICINE

## 2023-11-27 PROCEDURE — 85651 RBC SED RATE NONAUTOMATED: CPT | Mod: PO | Performed by: INTERNAL MEDICINE

## 2023-11-27 PROCEDURE — 84439 ASSAY OF FREE THYROXINE: CPT | Performed by: INTERNAL MEDICINE

## 2023-11-27 PROCEDURE — 85025 COMPLETE CBC W/AUTO DIFF WBC: CPT | Performed by: INTERNAL MEDICINE

## 2023-11-27 PROCEDURE — 83002 ASSAY OF GONADOTROPIN (LH): CPT | Performed by: INTERNAL MEDICINE

## 2023-11-27 PROCEDURE — 86140 C-REACTIVE PROTEIN: CPT | Performed by: INTERNAL MEDICINE

## 2023-11-27 PROCEDURE — 80061 LIPID PANEL: CPT | Mod: GA | Performed by: INTERNAL MEDICINE

## 2023-11-28 LAB
25(OH)D3+25(OH)D2 SERPL-MCNC: 57 NG/ML (ref 30–96)
ANA SER QL IF: NORMAL

## 2023-11-30 LAB — TESTOST FREE SERPL-MCNC: 3.8 PG/ML

## 2024-01-04 ENCOUNTER — TELEPHONE (OUTPATIENT)
Dept: CARDIOLOGY | Facility: HOSPITAL | Age: 79
End: 2024-01-04
Payer: MEDICARE

## 2024-01-04 ENCOUNTER — CLINICAL SUPPORT (OUTPATIENT)
Dept: CARDIOLOGY | Facility: HOSPITAL | Age: 79
End: 2024-01-04
Payer: MEDICARE

## 2024-01-04 DIAGNOSIS — Z95.0 PRESENCE OF CARDIAC PACEMAKER: ICD-10-CM

## 2024-01-04 NOTE — TELEPHONE ENCOUNTER
Spoke to patient's daughter  States he has been having extreme dizzy spells and can't stand or walk straight   Reviewed stroke s/s, denies any     Called patient assisted with home monitor transmission   Patient states about 1 week ago woke up in middle of the night went to bathroom became dizzy lasted about 4-5hrs, per daughter was weak for next few days; another event yesterday 1/3 he was at the computer and became dizzy lasting about 2.5hrs, he is unable to walk straight and unsteady with standing  Patient states BP is WNL    Transmission received  1 event noted AMS 28sec 12/2 at 1:43am      Cardiac medications:  Amlodipine 5mg daily  Telmisartan 20mg daily    In clinic device check 1/16  F/u PCP 2/27  F/u Dr. Rothman 8/1

## 2024-01-04 NOTE — TELEPHONE ENCOUNTER
Received notification of patient c/o dizziness  Returned call to patient daughter Ruth (unsure of spelling), left  requesting return call back

## 2024-01-06 ENCOUNTER — TELEPHONE (OUTPATIENT)
Dept: FAMILY MEDICINE | Facility: CLINIC | Age: 79
End: 2024-01-06
Payer: MEDICARE

## 2024-01-06 NOTE — TELEPHONE ENCOUNTER
----- Message from Vicki Salinas sent at 1/5/2024  2:30 PM CST -----  Regarding: appt access  Type:  Sooner Appointment Request    Caller is requesting a sooner appointment.  Caller declined first available appointment listed below.  Caller will not accept being placed on the waitlist and is requesting a message be sent to doctor.    Name of Caller:  pt daughter salvador  When is the first available appointment?  unk  Symptoms:  dizziness  Best Call Back Number:  [phone] (home)     Additional Information:  requesting a appt and call back asap please advise thank you

## 2024-01-09 ENCOUNTER — OFFICE VISIT (OUTPATIENT)
Dept: OTOLARYNGOLOGY | Facility: CLINIC | Age: 79
End: 2024-01-09
Payer: MEDICARE

## 2024-01-09 ENCOUNTER — TELEPHONE (OUTPATIENT)
Dept: AUDIOLOGY | Facility: CLINIC | Age: 79
End: 2024-01-09
Payer: MEDICARE

## 2024-01-09 VITALS
BODY MASS INDEX: 25.9 KG/M2 | SYSTOLIC BLOOD PRESSURE: 159 MMHG | DIASTOLIC BLOOD PRESSURE: 78 MMHG | HEIGHT: 68 IN | WEIGHT: 170.88 LBS

## 2024-01-09 DIAGNOSIS — H90.3 SENSORINEURAL HEARING LOSS (SNHL) OF BOTH EARS: ICD-10-CM

## 2024-01-09 DIAGNOSIS — H61.22 IMPACTED CERUMEN OF LEFT EAR: ICD-10-CM

## 2024-01-09 DIAGNOSIS — R42 DIZZINESS: Primary | ICD-10-CM

## 2024-01-09 DIAGNOSIS — R42 DIZZINESS AND GIDDINESS: ICD-10-CM

## 2024-01-09 PROCEDURE — 99214 OFFICE O/P EST MOD 30 MIN: CPT | Mod: 25,S$GLB,, | Performed by: STUDENT IN AN ORGANIZED HEALTH CARE EDUCATION/TRAINING PROGRAM

## 2024-01-09 PROCEDURE — 69210 REMOVE IMPACTED EAR WAX UNI: CPT | Mod: S$GLB,,, | Performed by: STUDENT IN AN ORGANIZED HEALTH CARE EDUCATION/TRAINING PROGRAM

## 2024-01-09 PROCEDURE — 99999 PR PBB SHADOW E&M-EST. PATIENT-LVL III: CPT | Mod: PBBFAC,,, | Performed by: STUDENT IN AN ORGANIZED HEALTH CARE EDUCATION/TRAINING PROGRAM

## 2024-01-09 NOTE — PROGRESS NOTES
Otolaryngology Clinic Note    Subjective:       Patient ID: José Miguel Austin is a 78 y.o. male.    Chief Complaint: Dizziness      History of Present Illness: José Miguel Austin is a 78 y.o. male presenting with dizziness. Started 3-4 weeks ago more severe. Maybe slight episodes prior. Woke up in middle of night to go to bathroom and went to walk and he fell to the left side. Used walls to support him. Felt horrible, was dizzy, nauseous. Did not vomit. Lasted 4-5 hours. Room did not spin. Still felt dizzy when laying down. Decreased enough to get mobile. Did not feel right still. Has had more episodes. Sitting at computer, felt like vision changes- like something slid across his face from right to left. Stayed there for 40 minutes with head down until that passes. No room spinning. No hearing change with it. No tinnitus. No recent illnesses. Had hip surgery 3 months ago. No new medicines. He still feels wobbly. Still going to the left every time. No headaches.   Had one event on implanted monitor per cardiology reviewed last week. Has pacemaker and stents.   No stroke history. No vision change. No leg weakness or numbness in feet.   7 months ago, went to pet dog and fell straight over.   He went for hearing aids last year and had ears cleaned as he was told there was wax. He has not been able to adjust and had issues with location he went to.     Past Surgical History:   Procedure Laterality Date    APPENDECTOMY      BASAL CELL CARCINOMA EXCISION      left forehead    CARDIAC PACEMAKER PLACEMENT      CIRCUMCISION      CORONARY STENT PLACEMENT      4 stents in RCA    inguinal hernia      LUMBAR DISC SURGERY  1998     Past Medical History:   Diagnosis Date    Basal cell carcinoma     CAD (coronary artery disease)     Hypertension      Social Determinants of Health     Tobacco Use: Low Risk  (1/9/2024)    Patient History     Smoking Tobacco Use: Never     Smokeless Tobacco Use: Never     Passive Exposure: Not on file    Alcohol Use: Not At Risk (5/31/2019)    AUDIT-C     Frequency of Alcohol Consumption: Never     Average Number of Drinks: Patient declined     Frequency of Binge Drinking: Never   Financial Resource Strain: Low Risk  (5/31/2019)    Overall Financial Resource Strain (CARDIA)     Difficulty of Paying Living Expenses: Not very hard   Food Insecurity: No Food Insecurity (5/31/2019)    Hunger Vital Sign     Worried About Running Out of Food in the Last Year: Never true     Ran Out of Food in the Last Year: Never true   Transportation Needs: No Transportation Needs (5/31/2019)    PRAPARE - Transportation     Lack of Transportation (Medical): No     Lack of Transportation (Non-Medical): No   Physical Activity: Sufficiently Active (5/31/2019)    Exercise Vital Sign     Days of Exercise per Week: 7 days     Minutes of Exercise per Session: 60 min   Stress: Stress Concern Present (5/31/2019)    [employer] [place] [employer] - Occupational Stress Questionnaire     Feeling of Stress : Very much   Social Connections: Unknown (5/31/2019)    Social Connection and Isolation Panel [NHANES]     Frequency of Communication with Friends and Family: Three times a week     Frequency of Social Gatherings with Friends and Family: Three times a week     Attends Taoist Services: Not on file     Active Member of Clubs or Organizations: No     Attends Club or Organization Meetings: Never     Marital Status:    Housing Stability: Not on file   Depression: Low Risk  (2/1/2023)    Depression     Last PHQ-4: Flowsheet Data: 2     Review of patient's allergies indicates:   Allergen Reactions    Gabapentin Hallucinations    Hydrocodone Nausea Only    Codeine Nausea Only     Current Outpatient Medications   Medication Instructions    alprostadiL (EDEX) 20 mcg, Intracavitary, As needed (PRN), use no more than 3 times per week    amLODIPine (NORVASC) 5 mg, Oral, Daily    buPROPion (WELLBUTRIN XL) 300 mg, Oral    coenzyme Q10 100  mg, Oral, Daily    fish oil-omega-3 fatty acids 300-1,000 mg capsule Oral, Daily    multivitamin capsule 1 capsule, Oral, Daily    prasterone, dhea, 50 mg Tab Oral, Daily    pravastatin (PRAVACHOL) 40 MG tablet TAKE 1 TABLET BY MOUTH DAILY    tamsulosin (FLOMAX) 0.4 mg, Oral, Nightly    telmisartan (MICARDIS) 20 mg, Oral, Daily    vibegron (GEMTESA) 75 mg Tab Take one tablet (75 mg) by mouth once daily.         ENT ROS negative except as stated above.     Patient answers are not available for this visit.            Objective:      Vitals:    01/09/24 1345   BP: (!) 159/78       General: NAD, well appearing  Eyes: Normal conjunctiva and lids  Face: symmetric, nerve intact  Nose: The nose is without any evidence of any deformity. The nasal mucosa is moist. The septum is midline. There is no evidence of septal hematoma. The turbinates are without abnormality.   Ears: The ears are with normal-appearing pinna. Examination of the canals is normal appearing bilaterally. There is no drainage or erythema noted. The tympanic membranes are normal appearing with pearly color, normal-appearing landmarks and normal light reflex. Hearing is grossly intact.  Mouth: No obvious abnormalities to the lips. The teeth are unremarkable. The gingivae are without any obvious evidence of infection or lesion. The oral mucosa is moist and pink. There are no obvious masses to the hard or soft palate.   Oropharynx: The uvula is midline.  The tongue is midline. The posterior pharynx is without erythema or exudate. The tonsils are normal appearing.  Salivary glands: The salivary glands are symmetric and not enlarged, no masses  Neck: No lymphadenopathy, trachea midline, thryoid not enlarged.  Psych: Normal mood and affect.   Neuro: Grossly intact  Speech: fluent  Dixhallpike and head  trhust negative. Romberg negative. Gait guarded with right hip replacement but steady    Procedure:   Cerumen impaction removal  Indications: Cerumen  impaction  Verbal consent obtained.   Under microscope, wax was removed from left ear using combination of suction, hook, curette instrumentation.   Patient tolerated procedure well.        Assessment and Plan:       1. Dizziness    2. Sensorineural hearing loss (SNHL) of both ears    3. Impacted cerumen of left ear          Cannot rule out brain reason for falling to left but no other neuro deficits.   Not sure if this has to do with vestibular system, is not true vertigo. Cardiology does not think this is heart related. Reviewed that this system is complex but we will see what we can rule in or out.   Would recommend vestibular physical therapy. Can do VNG. He wants to do VNG.   Did do balance PT in Feb. He wants to wait on more PT.   He has known hearing loss. Has hearing aids that he does not use. Recommend follow up with location that gave him hearing aids to readjust.     RTC: will follow up after MRI and VNG    Plan of care was discussed in detail with the patient, who agreed with the plan as above. All questions were answered in detail.     Rosana Gatica MD  Otolaryngology

## 2024-01-09 NOTE — TELEPHONE ENCOUNTER
ROBERT asking the patient to call us back so we can get him scheduled for a VNG ordered by Dr. Gatica.

## 2024-01-09 NOTE — TELEPHONE ENCOUNTER
----- Message from Salma Nesbitt MA sent at 1/9/2024  2:22 PM CST -----  Regarding: VNG  Could you please schedule a VNG

## 2024-01-10 ENCOUNTER — CLINICAL SUPPORT (OUTPATIENT)
Dept: CARDIOLOGY | Facility: HOSPITAL | Age: 79
End: 2024-01-10
Attending: INTERNAL MEDICINE
Payer: MEDICARE

## 2024-01-10 PROCEDURE — 93294 REM INTERROG EVL PM/LDLS PM: CPT | Mod: ,,, | Performed by: INTERNAL MEDICINE

## 2024-01-10 PROCEDURE — 93296 REM INTERROG EVL PM/IDS: CPT | Mod: PO | Performed by: INTERNAL MEDICINE

## 2024-01-12 LAB
OHS CV AF BURDEN PERCENT: < 1
OHS CV DC REMOTE DEVICE TYPE: NORMAL
OHS CV RV PACING PERCENT: 1 %

## 2024-01-16 ENCOUNTER — TELEPHONE (OUTPATIENT)
Dept: UROLOGY | Facility: CLINIC | Age: 79
End: 2024-01-16
Payer: MEDICARE

## 2024-01-22 ENCOUNTER — OFFICE VISIT (OUTPATIENT)
Dept: UROLOGY | Facility: CLINIC | Age: 79
End: 2024-01-22
Payer: MEDICARE

## 2024-01-22 ENCOUNTER — TELEPHONE (OUTPATIENT)
Dept: AUDIOLOGY | Facility: CLINIC | Age: 79
End: 2024-01-22
Payer: MEDICARE

## 2024-01-22 VITALS — HEIGHT: 68 IN | BODY MASS INDEX: 25.83 KG/M2 | WEIGHT: 170.44 LBS

## 2024-01-22 DIAGNOSIS — N40.1 BENIGN PROSTATIC HYPERPLASIA WITH URINARY OBSTRUCTION: Primary | ICD-10-CM

## 2024-01-22 DIAGNOSIS — N52.01 ERECTILE DYSFUNCTION DUE TO ARTERIAL INSUFFICIENCY: ICD-10-CM

## 2024-01-22 DIAGNOSIS — N13.8 BENIGN PROSTATIC HYPERPLASIA WITH URINARY OBSTRUCTION: Primary | ICD-10-CM

## 2024-01-22 DIAGNOSIS — N39.41 URGENCY INCONTINENCE: ICD-10-CM

## 2024-01-22 PROCEDURE — 99214 OFFICE O/P EST MOD 30 MIN: CPT | Mod: S$GLB,,, | Performed by: STUDENT IN AN ORGANIZED HEALTH CARE EDUCATION/TRAINING PROGRAM

## 2024-01-22 PROCEDURE — 99999 PR PBB SHADOW E&M-EST. PATIENT-LVL III: CPT | Mod: PBBFAC,,, | Performed by: STUDENT IN AN ORGANIZED HEALTH CARE EDUCATION/TRAINING PROGRAM

## 2024-01-22 RX ORDER — ACETAMINOPHEN 500 MG
1 TABLET ORAL DAILY
COMMUNITY

## 2024-01-22 RX ORDER — TESTOSTERONE CYPIONATE 200 MG/ML
INJECTION, SOLUTION INTRAMUSCULAR
COMMUNITY
Start: 2023-11-26

## 2024-01-22 RX ORDER — CELECOXIB 200 MG/1
CAPSULE ORAL
COMMUNITY
Start: 2023-09-19

## 2024-01-22 RX ORDER — OXYBUTYNIN CHLORIDE 10 MG/1
1 TABLET, EXTENDED RELEASE ORAL DAILY
COMMUNITY
End: 2024-01-22

## 2024-01-22 RX ORDER — OMEPRAZOLE 20 MG/1
20 CAPSULE, DELAYED RELEASE ORAL
COMMUNITY
Start: 2023-10-04 | End: 2024-02-29

## 2024-01-22 RX ORDER — MIRABEGRON 25 MG/1
25 TABLET, FILM COATED, EXTENDED RELEASE ORAL DAILY
Qty: 30 TABLET | Refills: 11 | Status: SHIPPED | OUTPATIENT
Start: 2024-01-22 | End: 2024-04-22 | Stop reason: SDUPTHER

## 2024-01-22 RX ORDER — IBUPROFEN 800 MG/1
800 TABLET ORAL 3 TIMES DAILY
COMMUNITY
Start: 2023-09-26

## 2024-01-22 RX ORDER — PREGABALIN 75 MG/1
CAPSULE ORAL
COMMUNITY
Start: 2023-09-26

## 2024-01-22 RX ORDER — AMMONIUM LACTATE 12 G/100G
LOTION TOPICAL 2 TIMES DAILY
COMMUNITY
Start: 2024-01-18

## 2024-01-22 RX ORDER — TRAMADOL HYDROCHLORIDE 50 MG/1
TABLET ORAL
COMMUNITY
Start: 2023-09-26 | End: 2024-02-29 | Stop reason: HOSPADM

## 2024-01-22 RX ORDER — PAPAVERINE HYDROCHLORIDE 30 MG/ML
INJECTION INTRAMUSCULAR; INTRAVENOUS
Qty: 10 ML | Refills: 11 | Status: SHIPPED | OUTPATIENT
Start: 2024-01-22

## 2024-01-22 RX ORDER — FLAXSEED OIL 1000 MG
1 CAPSULE ORAL DAILY
COMMUNITY

## 2024-01-22 RX ORDER — ACETAMINOPHEN 500 MG
1000 TABLET ORAL EVERY 8 HOURS
COMMUNITY
Start: 2023-09-26

## 2024-01-22 NOTE — TELEPHONE ENCOUNTER
Discussed pt's questions for his VNG test.  Advised no vestibular suppressants for 24-48 hours prior. Confirmed date, time and location of appts. Understanding voiced.      ----- Message from Dawna Garces sent at 1/10/2024  8:04 AM CST -----  Regarding: VNG  Please call the patient to go over the instructions for his VNG on 01/25/2024.

## 2024-01-22 NOTE — PROGRESS NOTES
"[facility]   Clinic Note    Subjective:     Chief Complaint: No chief complaint on file.      History of Present Illness:  José Miguel Austin is a 78 y.o. male who presents to clinic for evaluation and management of BPH and LUTS. He is established    He has LUTS. Recently started on flomax. Previous IPSS was 18/4. Prostate volume on US from 3/2023 was 37 cc. Cystoscopy demonstrated mild intravesical prostatic protrusion but bilobar enlargement.  Previous IPSS was 14/3. He reports ugency with urge urinary incontinence. He has incontinence a few times per day. His stream is strong. Previously on oxybutynin. Gemtesa was cost prohibitive and he is unsure if he noticed benefit. IPSS today is 10/3. Previous PVR was 0 mL.     IPSS Questionnaire (AUA-SS):  1) Incomplete Emptying 1 - Less than 1 time in 5   2) Frequency 2 - Less than half the time   3) Intermittency 0 - Not at all   4) Urgency 5 - Almost always   5) Weak Stream  0 - Not at all   6) Straining 0 - Not at all   7) Nocturia 2 - 2 times   Total score:   10   Quality of Life:  3 - Mixed      He was previously on testosterone but stopped it a few months ago.  He started back on T therapy more recently and he feels better on therapy. Managed by outside endocrinologist    He also had ED. Previously on alprostadil injections but the pharmacy he was using no longer supplies it.     PSAs have been below 1 for years.     Past medical, family, surgical and social history reviewed as documented in chart with pertinent positive medical, family, surgical and social history detailed in HPI.    A review systems was conducted with pertinent positive and negative findings documented in HPI.    Anticoagulation/Antiplatelets:  No    Objective:     Estimated body mass index is 25.91 kg/m² as calculated from the following:    Height as of this encounter: 5' 8" (1.727 m).    Weight as of this encounter: 77.3 kg (170 lb 6.7 oz).    Vital Signs (Most Recent)       Physical " Exam  Vitals and nursing note reviewed.   Constitutional:       General: He is not in acute distress.     Appearance: He is not ill-appearing or toxic-appearing.   Pulmonary:      Effort: Pulmonary effort is normal. No accessory muscle usage or respiratory distress.   Neurological:      Mental Status: He is alert.         Lab Results   Component Value Date    BUN 20 11/27/2023    CREATININE 1.1 11/27/2023    WBC 13.46 (H) 11/27/2023    HGB 15.9 11/27/2023    HCT 48.4 11/27/2023     11/27/2023    AST 25 11/27/2023    ALT 22 11/27/2023    ALKPHOS 98 11/27/2023    ALBUMIN 3.4 (L) 11/27/2023    HGBA1C 5.4 03/30/2021        Lab Results   Component Value Date    PSA 1.4 11/27/2023    PSA 0.87 11/01/2022    PSA 0.93 09/30/2021    PSA 0.71 02/07/2020    PSA 0.62 12/20/2018    PSA 0.52 12/03/2018    PSA 0.94 11/21/2017    PSA 0.83 10/04/2017    PSA 0.66 03/09/2016    PSA 0.82 02/04/2016    PSADIAG 0.95 04/26/2022    PSAFREE 0.13 10/04/2017    PSAFREEPCT 28.26 10/04/2017      Assessment:     1. Benign prostatic hyperplasia with urinary obstruction    2. Urgency incontinence    3. Erectile dysfunction due to arterial insufficiency        Plan:     We discussed the association of lower urinary tract symptoms (LUTS) and prostate enlargement (BPH). The management of BPH varies widely depending on the degree of bother, adverse events related to BPH, and the overall size of the prostate. We discussed behavioral modifications, medical management,  and outlet procedures, as well as the indications, risks, and benefits.    He is not interested in procedural intervention at this time.  Stop Courtney, trial for Myrbetriq   Follow-up in 3 months with a uroflow and IPSS.    Trimix Rx sent to Fabi Tyler MD

## 2024-01-25 ENCOUNTER — CLINICAL SUPPORT (OUTPATIENT)
Dept: AUDIOLOGY | Facility: CLINIC | Age: 79
End: 2024-01-25
Payer: MEDICARE

## 2024-01-25 DIAGNOSIS — H90.3 SENSORINEURAL HEARING LOSS (SNHL) OF BOTH EARS: ICD-10-CM

## 2024-01-25 DIAGNOSIS — H61.22 IMPACTED CERUMEN OF LEFT EAR: ICD-10-CM

## 2024-01-25 DIAGNOSIS — H81.4 CENTRAL VESTIBULAR VERTIGO: ICD-10-CM

## 2024-01-25 DIAGNOSIS — R42 DISEQUILIBRIUM: Primary | ICD-10-CM

## 2024-01-25 DIAGNOSIS — R42 DIZZINESS: ICD-10-CM

## 2024-01-25 PROCEDURE — 92540 BASIC VESTIBULAR EVALUATION: CPT | Mod: S$GLB,,, | Performed by: AUDIOLOGIST

## 2024-01-25 PROCEDURE — 92537 CALORIC VSTBLR TEST W/REC: CPT | Mod: S$GLB,,, | Performed by: AUDIOLOGIST

## 2024-01-25 NOTE — Clinical Note
Impressions: Abnormal central VNG due to abnormal oculomotor subtest performance for sinusoidal tracking and saccades as well as abnormal caloric fixation suppression.  Negative for BPPV bilaterally.  No significant caloric asymmetry or weakness.    Recommendations: 1. ENT review of results 2. Referral for formal VRT with PT 3. Referral to Neurology

## 2024-01-25 NOTE — PROGRESS NOTES
Referring provider:  Rosana Gatica MD     José Miguel Austin was seen 2024 for Videonystagmography (VNG) testing.      Pt reportedly abstained from vestibular suppressants for 24-48 hours prior to his VNG today.       VAT was negative bilaterally.      VNG Results (abnormal results italicized):   Oculomotor function tests:  Sinusoidal tracking - abnormal  Saccades - abnormal velocity; normal latency and accuracy  OPKs - WNL  Spontaneous nystagmus was absent.  Gaze nystagmus was absent.  Dustin-Hallpike Left was negative for BPPV.  [place]-Hallpike Right was negative for BPPV.  Static Positional nystagmus was absent.  Bi-thermal caloric irrigations revealed a 1% caloric weakness in the left ear, which is within normal limits, and 9% directional preponderance to the right, which is within normal limits.  Fixation suppression following caloric irrigations were abnormal.  RC: 26 d/s   RW: 43 d/s    d/s   LW: 36 d/s     Impressions: Abnormal central VNG due to abnormal oculomotor subtest performance for sinusoidal tracking and saccades as well as abnormal caloric fixation suppression.  Negative for BPPV bilaterally.  No significant caloric asymmetry or weakness.      Recommendations:  1. ENT review of results  2. Referral for formal VRT with PT  3. Referral to Neurology    Tracings will be scanned into the patient's chart.

## 2024-01-26 ENCOUNTER — TELEPHONE (OUTPATIENT)
Dept: OTOLARYNGOLOGY | Facility: CLINIC | Age: 79
End: 2024-01-26
Payer: MEDICARE

## 2024-01-26 NOTE — TELEPHONE ENCOUNTER
----- Message from Rosana Gatica MD sent at 1/26/2024  7:41 AM CST -----  Please let patient know that I have placed a referral to neurology for central findings on his balance testing with Diana. Hopefully they will have some more insight. It does not appear to be related to his ears. I ordered an Mri at our clinic visit, it does not look like it was scheduled. If he is agreeable, can you also help him schedule that?   Thanks!    ----- Message -----  From: Diana Lopez, CCC-A  Sent: 1/25/2024  11:35 AM CST  To: Rosana Gatica MD    Impressions: Abnormal central VNG due to abnormal oculomotor subtest performance for sinusoidal tracking and saccades as well as abnormal caloric fixation suppression.  Negative for BPPV bilaterally.  No significant caloric asymmetry or weakness.      Recommendations:  1. ENT review of results  2. Referral for formal VRT with PT  3. Referral to Neurology

## 2024-01-26 NOTE — PROGRESS NOTES
Neurology referral placed. MRI was ordered by not scheduled. Will have my staff follow up on both.     Rosana Gatica MD

## 2024-01-26 NOTE — TELEPHONE ENCOUNTER
Pt notified. Verbalizes understanding. Pr scheduled at [facility] for MRI. Told him he wld have to get labs prior, pt agreeable.

## 2024-01-31 ENCOUNTER — TELEPHONE (OUTPATIENT)
Dept: NEUROLOGY | Facility: CLINIC | Age: 79
End: 2024-01-31
Payer: MEDICARE

## 2024-01-31 NOTE — TELEPHONE ENCOUNTER
Spoke to the pt, appt scheduled on 4/22/24 at 0930 with Arlet Leggett for dizziness and balance issues.date, time and location discussed.

## 2024-02-08 ENCOUNTER — TELEPHONE (OUTPATIENT)
Dept: OTOLARYNGOLOGY | Facility: CLINIC | Age: 79
End: 2024-02-08
Payer: MEDICARE

## 2024-02-08 ENCOUNTER — LAB VISIT (OUTPATIENT)
Dept: LAB | Facility: HOSPITAL | Age: 79
End: 2024-02-08
Attending: STUDENT IN AN ORGANIZED HEALTH CARE EDUCATION/TRAINING PROGRAM
Payer: MEDICARE

## 2024-02-08 DIAGNOSIS — R42 DIZZINESS: ICD-10-CM

## 2024-02-08 DIAGNOSIS — H90.3 SENSORINEURAL HEARING LOSS (SNHL) OF BOTH EARS: ICD-10-CM

## 2024-02-08 DIAGNOSIS — H61.22 IMPACTED CERUMEN OF LEFT EAR: ICD-10-CM

## 2024-02-08 LAB
CREAT SERPL-MCNC: 1.2 MG/DL (ref 0.5–1.4)
EST. GFR  (NO RACE VARIABLE): >60 ML/MIN/1.73 M^2

## 2024-02-08 PROCEDURE — 82565 ASSAY OF CREATININE: CPT | Performed by: STUDENT IN AN ORGANIZED HEALTH CARE EDUCATION/TRAINING PROGRAM

## 2024-02-08 PROCEDURE — 36415 COLL VENOUS BLD VENIPUNCTURE: CPT | Mod: PO | Performed by: STUDENT IN AN ORGANIZED HEALTH CARE EDUCATION/TRAINING PROGRAM

## 2024-02-08 NOTE — TELEPHONE ENCOUNTER
----- Message from Nadiya Sharp sent at 2/8/2024 10:17 AM CST -----  Regarding: orders for labs  Contact: pt  Type:  Needs Medical Advice    Who Called: pt  Would the patient rather a call back or a response via [facility]? Call back  Best Call Back Number: [phone]    Additional Information: sts he needs an order to get labs done at the [facility] before his MRI on 2/14/24--please advise

## 2024-02-08 NOTE — TELEPHONE ENCOUNTER
S/w pt and advised that he already has orders for his labs he just needs to schedule, pt verbalized understanding.

## 2024-02-12 DIAGNOSIS — Z95.0 PRESENCE OF CARDIAC PACEMAKER: Primary | ICD-10-CM

## 2024-02-14 ENCOUNTER — TELEPHONE (OUTPATIENT)
Dept: OTOLARYNGOLOGY | Facility: CLINIC | Age: 79
End: 2024-02-14
Payer: MEDICARE

## 2024-02-19 ENCOUNTER — TELEPHONE (OUTPATIENT)
Dept: OTOLARYNGOLOGY | Facility: CLINIC | Age: 79
End: 2024-02-19
Payer: MEDICARE

## 2024-02-19 NOTE — TELEPHONE ENCOUNTER
----- Message from Sade English LPN sent at 2/16/2024 11:37 AM CST -----  Pt is returning Merlyn's call about his CT scan, pt is asking to s/w Jenny-Dr. Gatica's nurse. I advised no Jenny in ENT asked pt if maybe  Jenny in Radiology, he states no and to just have Merlyn return his call on Monday.

## 2024-02-27 ENCOUNTER — OFFICE VISIT (OUTPATIENT)
Dept: FAMILY MEDICINE | Facility: CLINIC | Age: 79
End: 2024-02-27
Payer: MEDICARE

## 2024-02-27 VITALS
WEIGHT: 167.75 LBS | DIASTOLIC BLOOD PRESSURE: 68 MMHG | HEIGHT: 68 IN | HEART RATE: 69 BPM | OXYGEN SATURATION: 98 % | SYSTOLIC BLOOD PRESSURE: 138 MMHG | BODY MASS INDEX: 25.42 KG/M2

## 2024-02-27 DIAGNOSIS — I10 PRIMARY HYPERTENSION: ICD-10-CM

## 2024-02-27 DIAGNOSIS — R42 DYSEQUILIBRIUM: Primary | ICD-10-CM

## 2024-02-27 PROCEDURE — 99213 OFFICE O/P EST LOW 20 MIN: CPT | Mod: S$GLB,,, | Performed by: FAMILY MEDICINE

## 2024-02-27 PROCEDURE — 99999 PR PBB SHADOW E&M-EST. PATIENT-LVL IV: CPT | Mod: PBBFAC,,, | Performed by: FAMILY MEDICINE
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
## 2024-02-29 ENCOUNTER — TELEPHONE (OUTPATIENT)
Dept: OTOLARYNGOLOGY | Facility: CLINIC | Age: 79
End: 2024-02-29
Payer: MEDICARE

## 2024-02-29 ENCOUNTER — HOSPITAL ENCOUNTER (OUTPATIENT)
Dept: RADIOLOGY | Facility: HOSPITAL | Age: 79
Discharge: HOME | End: 2024-02-29
Attending: STUDENT IN AN ORGANIZED HEALTH CARE EDUCATION/TRAINING PROGRAM
Payer: MEDICARE

## 2024-02-29 DIAGNOSIS — R42 DIZZINESS AND GIDDINESS: ICD-10-CM

## 2024-02-29 PROCEDURE — 70470 CT HEAD/BRAIN W/O & W/DYE: CPT | Mod: 26,,, | Performed by: RADIOLOGY

## 2024-02-29 PROCEDURE — 25500020 PHARM REV CODE 255: Mod: PO | Performed by: STUDENT IN AN ORGANIZED HEALTH CARE EDUCATION/TRAINING PROGRAM

## 2024-02-29 PROCEDURE — 70470 CT HEAD/BRAIN W/O & W/DYE: CPT | Mod: TC,PO

## 2024-02-29 RX ADMIN — IOHEXOL 75 ML: 350 INJECTION, SOLUTION INTRAVENOUS at 08:02

## 2024-02-29 NOTE — PROGRESS NOTES
Please let patient know his CT does not show any specific concerns or masses to explain his imbalance. It does show some mild age related changes. I would still recommend follow up with neurology and consider physical therapy for balance again if not improving.

## 2024-02-29 NOTE — TELEPHONE ENCOUNTER
There are some brain volume and vascular changes the radiologist commented on that do not seem to be out of the ordinary, but his primary care may be able to give him more information on those findings.

## 2024-02-29 NOTE — TELEPHONE ENCOUNTER
S/w pt, verbalized understanding of your recommendations. Would like to know in more depth what 'age-related' changes is referring to.

## 2024-02-29 NOTE — TELEPHONE ENCOUNTER
----- Message from Rosana Gatica MD sent at 2/29/2024 12:40 PM CST -----  Please let patient know his CT does not show any specific concerns or masses to explain his imbalance. It does show some mild age related changes. I would still recommend follow up with neurology and consider physical therapy for balance again if not improving.

## 2024-04-10 ENCOUNTER — HOSPITAL ENCOUNTER (OUTPATIENT)
Dept: CARDIOLOGY | Facility: HOSPITAL | Age: 79
Discharge: HOME | End: 2024-04-10
Attending: INTERNAL MEDICINE

## 2024-04-10 ENCOUNTER — CLINICAL SUPPORT (OUTPATIENT)
Dept: CARDIOLOGY | Facility: HOSPITAL | Age: 79
End: 2024-04-10
Payer: MEDICARE

## 2024-04-10 DIAGNOSIS — Z95.0 PRESENCE OF CARDIAC PACEMAKER: ICD-10-CM

## 2024-04-10 PROCEDURE — 93296 REM INTERROG EVL PM/IDS: CPT | Mod: PO | Performed by: INTERNAL MEDICINE

## 2024-04-10 PROCEDURE — 93294 REM INTERROG EVL PM/LDLS PM: CPT | Mod: ,,, | Performed by: INTERNAL MEDICINE

## 2024-04-22 ENCOUNTER — OFFICE VISIT (OUTPATIENT)
Dept: UROLOGY | Facility: CLINIC | Age: 79
End: 2024-04-22
Payer: MEDICARE

## 2024-04-22 ENCOUNTER — OFFICE VISIT (OUTPATIENT)
Dept: NEUROLOGY | Facility: CLINIC | Age: 79
End: 2024-04-22
Payer: MEDICARE

## 2024-04-22 VITALS
HEIGHT: 68 IN | DIASTOLIC BLOOD PRESSURE: 80 MMHG | WEIGHT: 174.19 LBS | RESPIRATION RATE: 17 BRPM | HEART RATE: 77 BPM | SYSTOLIC BLOOD PRESSURE: 176 MMHG | BODY MASS INDEX: 26.4 KG/M2

## 2024-04-22 VITALS — HEIGHT: 68 IN | WEIGHT: 174.38 LBS | BODY MASS INDEX: 26.43 KG/M2

## 2024-04-22 DIAGNOSIS — N40.1 BENIGN PROSTATIC HYPERPLASIA WITH URINARY OBSTRUCTION: Primary | ICD-10-CM

## 2024-04-22 DIAGNOSIS — N52.01 ERECTILE DYSFUNCTION DUE TO ARTERIAL INSUFFICIENCY: ICD-10-CM

## 2024-04-22 DIAGNOSIS — N13.8 BENIGN PROSTATIC HYPERPLASIA WITH URINARY OBSTRUCTION: Primary | ICD-10-CM

## 2024-04-22 DIAGNOSIS — R42 DIZZINESS: ICD-10-CM

## 2024-04-22 DIAGNOSIS — R42 DISEQUILIBRIUM: ICD-10-CM

## 2024-04-22 DIAGNOSIS — N39.41 URGENCY INCONTINENCE: ICD-10-CM

## 2024-04-22 PROCEDURE — 3079F DIAST BP 80-89 MM HG: CPT | Mod: CPTII,S$GLB,, | Performed by: NURSE PRACTITIONER

## 2024-04-22 PROCEDURE — 1126F AMNT PAIN NOTED NONE PRSNT: CPT | Mod: CPTII,S$GLB,, | Performed by: STUDENT IN AN ORGANIZED HEALTH CARE EDUCATION/TRAINING PROGRAM

## 2024-04-22 PROCEDURE — 99214 OFFICE O/P EST MOD 30 MIN: CPT | Mod: S$GLB,,, | Performed by: STUDENT IN AN ORGANIZED HEALTH CARE EDUCATION/TRAINING PROGRAM

## 2024-04-22 PROCEDURE — 3077F SYST BP >= 140 MM HG: CPT | Mod: CPTII,S$GLB,, | Performed by: NURSE PRACTITIONER

## 2024-04-22 PROCEDURE — 1101F PT FALLS ASSESS-DOCD LE1/YR: CPT | Mod: CPTII,S$GLB,, | Performed by: NURSE PRACTITIONER

## 2024-04-22 PROCEDURE — 3288F FALL RISK ASSESSMENT DOCD: CPT | Mod: CPTII,S$GLB,, | Performed by: STUDENT IN AN ORGANIZED HEALTH CARE EDUCATION/TRAINING PROGRAM

## 2024-04-22 PROCEDURE — 1101F PT FALLS ASSESS-DOCD LE1/YR: CPT | Mod: CPTII,S$GLB,, | Performed by: STUDENT IN AN ORGANIZED HEALTH CARE EDUCATION/TRAINING PROGRAM

## 2024-04-22 PROCEDURE — G2211 COMPLEX E/M VISIT ADD ON: HCPCS | Mod: S$GLB,,, | Performed by: STUDENT IN AN ORGANIZED HEALTH CARE EDUCATION/TRAINING PROGRAM

## 2024-04-22 PROCEDURE — 99999 PR PBB SHADOW E&M-EST. PATIENT-LVL III: CPT | Mod: PBBFAC,,, | Performed by: STUDENT IN AN ORGANIZED HEALTH CARE EDUCATION/TRAINING PROGRAM

## 2024-04-22 PROCEDURE — 99203 OFFICE O/P NEW LOW 30 MIN: CPT | Mod: S$GLB,,, | Performed by: NURSE PRACTITIONER

## 2024-04-22 PROCEDURE — 1160F RVW MEDS BY RX/DR IN RCRD: CPT | Mod: CPTII,S$GLB,, | Performed by: NURSE PRACTITIONER

## 2024-04-22 PROCEDURE — 99999 PR PBB SHADOW E&M-EST. PATIENT-LVL V: CPT | Mod: PBBFAC,,, | Performed by: NURSE PRACTITIONER

## 2024-04-22 PROCEDURE — 1159F MED LIST DOCD IN RCRD: CPT | Mod: CPTII,S$GLB,, | Performed by: NURSE PRACTITIONER

## 2024-04-22 PROCEDURE — 1126F AMNT PAIN NOTED NONE PRSNT: CPT | Mod: CPTII,S$GLB,, | Performed by: NURSE PRACTITIONER

## 2024-04-22 PROCEDURE — 3288F FALL RISK ASSESSMENT DOCD: CPT | Mod: CPTII,S$GLB,, | Performed by: NURSE PRACTITIONER

## 2024-04-22 RX ORDER — MIRABEGRON 25 MG/1
25 TABLET, FILM COATED, EXTENDED RELEASE ORAL DAILY
Qty: 30 TABLET | Refills: 11 | Status: SHIPPED | OUTPATIENT
Start: 2024-04-22 | End: 2025-04-22

## 2024-04-22 RX ORDER — TAMSULOSIN HYDROCHLORIDE 0.4 MG/1
0.4 CAPSULE ORAL NIGHTLY
Qty: 30 CAPSULE | Refills: 11 | Status: SHIPPED | OUTPATIENT
Start: 2024-04-22 | End: 2025-04-22
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
## 2024-04-22 NOTE — PROGRESS NOTES
"[facility] - Urology   Clinic Note    Subjective:     Chief Complaint: Follow-up (3 month with uroflow )      History of Present Illness:  José Miguel Austin is a 78 y.o. male who presents to clinic for evaluation and management of BPH and LUTS. He is established    He has LUTS. Previous IPSS was 18/4. IPSS today is 4/2. He is on flomax and myrbetriq. He has had urgency with urge urinary incontinence but the frequency of UI episodes has decreased from a few times per day to about 1 time per week. His stream is strong. PVRs have been 0 mL.   Prostate volume on US from 3/2023 was 37 cc. Cystoscopy demonstrated mild intravesical prostatic protrusion and bilobar enlargement.       He started back on T therapy more recently and he feels better on therapy. Managed by Dr. Dickey    He has ED. Previously on alprostadil injections but the pharmacy he was using no longer supplies it. He was started on trimix. He uses 50 units. He gets moderate erections firm enough for penetration.     PSAs have been below 1 for years.     IPSS Questionnaire (AUA-SS):  1) Incomplete Emptying 0 - Not at all   2) Frequency 1 - Less than 1 time in 5   3) Intermittency 0 - Not at all   4) Urgency 1 - Less than 1 time in 5   5) Weak Stream  0 - Not at all   6) Straining 0 - Not at all   7) Nocturia 2 - 2 times   Total score:   4   Quality of Life:  2 - Mostly Satisfied        Past medical, family, surgical and social history reviewed as documented in chart with pertinent positive medical, family, surgical and social history detailed in HPI.    A review systems was conducted with pertinent positive and negative findings documented in HPI.    Anticoagulation/Antiplatelets:  No    Objective:     Estimated body mass index is 26.51 kg/m² as calculated from the following:    Height as of this encounter: 5' 8" (1.727 m).    Weight as of this encounter: 79.1 kg (174 lb 6.1 oz).    Vital Signs (Most Recent)       Physical Exam  Vitals and nursing note " reviewed.   Constitutional:       General: He is not in acute distress.     Appearance: He is not ill-appearing or toxic-appearing.   Pulmonary:      Effort: Pulmonary effort is normal. No accessory muscle usage or respiratory distress.   Neurological:      Mental Status: He is alert.         Lab Results   Component Value Date    BUN 20 11/27/2023    CREATININE 1.2 02/08/2024    WBC 13.46 (H) 11/27/2023    HGB 15.9 11/27/2023    HCT 48.4 11/27/2023     11/27/2023    AST 25 11/27/2023    ALT 22 11/27/2023    ALKPHOS 98 11/27/2023    ALBUMIN 3.4 (L) 11/27/2023    HGBA1C 5.4 03/30/2021        Lab Results   Component Value Date    PSA 1.4 11/27/2023    PSA 0.87 11/01/2022    PSA 0.93 09/30/2021    PSA 0.71 02/07/2020    PSA 0.62 12/20/2018    PSA 0.52 12/03/2018    PSA 0.94 11/21/2017    PSA 0.83 10/04/2017    PSA 0.66 03/09/2016    PSA 0.82 02/04/2016    PSADIAG 0.95 04/26/2022    PSAFREE 0.13 10/04/2017    PSAFREEPCT 28.26 10/04/2017      Assessment:     1. Benign prostatic hyperplasia with urinary obstruction    2. Urgency incontinence    3. Erectile dysfunction due to arterial insufficiency        Plan:     Continue flomax and myrbetriq. Refills sent.  Okay to slowly titrate trimix to desired effective dose. We discussed the risk of priapism.   Dr. Edmondson to continue managing the TRT    Follow up in 1 year.     Nathan Tyler MD

## 2024-04-26 LAB
OHS CV AF BURDEN PERCENT: < 1
OHS CV DC REMOTE DEVICE TYPE: NORMAL
OHS CV RV PACING PERCENT: 1 %

## 2024-06-09 DIAGNOSIS — I25.10 CORONARY ARTERY DISEASE INVOLVING NATIVE CORONARY ARTERY WITHOUT ANGINA PECTORIS, UNSPECIFIED WHETHER NATIVE OR TRANSPLANTED HEART: ICD-10-CM

## 2024-06-10 RX ORDER — PRAVASTATIN SODIUM 40 MG/1
TABLET ORAL
Qty: 90 TABLET | Refills: 2 | Status: SHIPPED | OUTPATIENT
Start: 2024-06-10

## 2024-07-08 ENCOUNTER — LAB VISIT (OUTPATIENT)
Dept: LAB | Facility: HOSPITAL | Age: 79
End: 2024-07-08
Payer: MEDICARE

## 2024-07-08 DIAGNOSIS — L60.3 MEDIAN CANALIFORM NAIL DYSTROPHY: Primary | ICD-10-CM

## 2024-07-08 LAB
ALBUMIN SERPL BCP-MCNC: 3.3 G/DL (ref 3.5–5.2)
ALP SERPL-CCNC: 73 U/L (ref 55–135)
ALT SERPL W/O P-5'-P-CCNC: 19 U/L (ref 10–44)
AST SERPL-CCNC: 26 U/L (ref 10–40)
BILIRUB DIRECT SERPL-MCNC: 0.3 MG/DL (ref 0.1–0.3)
BILIRUB SERPL-MCNC: 0.5 MG/DL (ref 0.1–1)
PROT SERPL-MCNC: 6.7 G/DL (ref 6–8.4)

## 2024-07-08 PROCEDURE — 80076 HEPATIC FUNCTION PANEL: CPT

## 2024-07-08 PROCEDURE — 36415 COLL VENOUS BLD VENIPUNCTURE: CPT | Mod: PO

## 2024-07-10 ENCOUNTER — CLINICAL SUPPORT (OUTPATIENT)
Dept: CARDIOLOGY | Facility: HOSPITAL | Age: 79
End: 2024-07-10
Payer: MEDICARE

## 2024-07-10 ENCOUNTER — HOSPITAL ENCOUNTER (OUTPATIENT)
Dept: CARDIOLOGY | Facility: HOSPITAL | Age: 79
Discharge: HOME | End: 2024-07-10
Attending: INTERNAL MEDICINE

## 2024-07-10 DIAGNOSIS — Z95.0 PRESENCE OF CARDIAC PACEMAKER: ICD-10-CM

## 2024-07-10 PROCEDURE — 93294 REM INTERROG EVL PM/LDLS PM: CPT | Mod: ,,, | Performed by: INTERNAL MEDICINE

## 2024-07-10 PROCEDURE — 93296 REM INTERROG EVL PM/IDS: CPT | Mod: PO | Performed by: INTERNAL MEDICINE

## 2024-07-15 LAB
OHS CV AF BURDEN PERCENT: < 1
OHS CV DC REMOTE DEVICE TYPE: NORMAL
OHS CV RV PACING PERCENT: 1 %

## 2024-07-23 ENCOUNTER — LAB VISIT (OUTPATIENT)
Dept: LAB | Facility: HOSPITAL | Age: 79
End: 2024-07-23
Attending: INTERNAL MEDICINE
Payer: MEDICARE

## 2024-07-23 DIAGNOSIS — E53.8 BIOTIN-(PROPIONYL-COA-CARBOXYLASE) LIGASE DEFICIENCY: Primary | ICD-10-CM

## 2024-07-23 DIAGNOSIS — E55.9 VITAMIN D DEFICIENCY: ICD-10-CM

## 2024-07-23 DIAGNOSIS — M79.10 MUSCLE PAIN: ICD-10-CM

## 2024-07-23 DIAGNOSIS — E29.1 3-OXO-5 ALPHA-STEROID DELTA 4-DEHYDROGENASE DEFICIENCY: ICD-10-CM

## 2024-07-23 DIAGNOSIS — E78.49 FAMILIAL COMBINED HYPERLIPIDEMIA: ICD-10-CM

## 2024-07-23 DIAGNOSIS — R53.81 MALAISE: ICD-10-CM

## 2024-07-23 DIAGNOSIS — Z12.5 SPECIAL SCREENING FOR MALIGNANT NEOPLASM OF PROSTATE: ICD-10-CM

## 2024-07-23 LAB
ALBUMIN SERPL BCP-MCNC: 3.4 G/DL (ref 3.5–5.2)
ALP SERPL-CCNC: 73 U/L (ref 55–135)
ALT SERPL W/O P-5'-P-CCNC: 20 U/L (ref 10–44)
ANION GAP SERPL CALC-SCNC: 7 MMOL/L (ref 8–16)
AST SERPL-CCNC: 26 U/L (ref 10–40)
BASOPHILS # BLD AUTO: 0.05 K/UL (ref 0–0.2)
BASOPHILS NFR BLD: 0.6 % (ref 0–1.9)
BILIRUB SERPL-MCNC: 0.7 MG/DL (ref 0.1–1)
BUN SERPL-MCNC: 17 MG/DL (ref 8–23)
CALCIUM SERPL-MCNC: 9 MG/DL (ref 8.7–10.5)
CHLORIDE SERPL-SCNC: 104 MMOL/L (ref 95–110)
CHOLEST SERPL-MCNC: 117 MG/DL (ref 120–199)
CHOLEST/HDLC SERPL: 2.8 {RATIO} (ref 2–5)
CO2 SERPL-SCNC: 24 MMOL/L (ref 23–29)
COMPLEXED PSA SERPL-MCNC: 1.2 NG/ML (ref 0–4)
CREAT SERPL-MCNC: 1.3 MG/DL (ref 0.5–1.4)
DIFFERENTIAL METHOD BLD: ABNORMAL
EOSINOPHIL # BLD AUTO: 0.1 K/UL (ref 0–0.5)
EOSINOPHIL NFR BLD: 1.5 % (ref 0–8)
ERYTHROCYTE [DISTWIDTH] IN BLOOD BY AUTOMATED COUNT: 14.3 % (ref 11.5–14.5)
EST. GFR  (NO RACE VARIABLE): 55.2 ML/MIN/1.73 M^2
GLUCOSE SERPL-MCNC: 82 MG/DL (ref 70–110)
HCT VFR BLD AUTO: 50 % (ref 40–54)
HDLC SERPL-MCNC: 42 MG/DL (ref 40–75)
HDLC SERPL: 35.9 % (ref 20–50)
HGB BLD-MCNC: 16.7 G/DL (ref 14–18)
IMM GRANULOCYTES # BLD AUTO: 0.06 K/UL (ref 0–0.04)
IMM GRANULOCYTES NFR BLD AUTO: 0.7 % (ref 0–0.5)
LDLC SERPL CALC-MCNC: 65 MG/DL (ref 63–159)
LH SERPL-ACNC: 0.1 MIU/ML (ref 0.6–12.1)
LYMPHOCYTES # BLD AUTO: 1.8 K/UL (ref 1–4.8)
LYMPHOCYTES NFR BLD: 20.8 % (ref 18–48)
MCH RBC QN AUTO: 33.3 PG (ref 27–31)
MCHC RBC AUTO-ENTMCNC: 33.4 G/DL (ref 32–36)
MCV RBC AUTO: 100 FL (ref 82–98)
MONOCYTES # BLD AUTO: 1.1 K/UL (ref 0.3–1)
MONOCYTES NFR BLD: 13.1 % (ref 4–15)
NEUTROPHILS # BLD AUTO: 5.3 K/UL (ref 1.8–7.7)
NEUTROPHILS NFR BLD: 63.3 % (ref 38–73)
NONHDLC SERPL-MCNC: 75 MG/DL
NRBC BLD-RTO: 0 /100 WBC
PLATELET # BLD AUTO: 269 K/UL (ref 150–450)
PMV BLD AUTO: 10.5 FL (ref 9.2–12.9)
POTASSIUM SERPL-SCNC: 4.5 MMOL/L (ref 3.5–5.1)
PROT SERPL-MCNC: 6.9 G/DL (ref 6–8.4)
RBC # BLD AUTO: 5.01 M/UL (ref 4.6–6.2)
SODIUM SERPL-SCNC: 135 MMOL/L (ref 136–145)
T3FREE SERPL-MCNC: 2.7 PG/ML (ref 2.3–4.2)
T4 FREE SERPL-MCNC: 0.91 NG/DL (ref 0.71–1.51)
TRIGL SERPL-MCNC: 50 MG/DL (ref 30–150)
TSH SERPL DL<=0.005 MIU/L-ACNC: 2.52 UIU/ML (ref 0.4–4)
WBC # BLD AUTO: 8.42 K/UL (ref 3.9–12.7)

## 2024-07-23 PROCEDURE — 84439 ASSAY OF FREE THYROXINE: CPT | Performed by: INTERNAL MEDICINE

## 2024-07-23 PROCEDURE — 84443 ASSAY THYROID STIM HORMONE: CPT | Performed by: INTERNAL MEDICINE

## 2024-07-23 PROCEDURE — 84153 ASSAY OF PSA TOTAL: CPT | Mod: GA | Performed by: INTERNAL MEDICINE

## 2024-07-23 PROCEDURE — 80053 COMPREHEN METABOLIC PANEL: CPT | Performed by: INTERNAL MEDICINE

## 2024-07-23 PROCEDURE — 80061 LIPID PANEL: CPT | Performed by: INTERNAL MEDICINE

## 2024-07-23 PROCEDURE — 84270 ASSAY OF SEX HORMONE GLOBUL: CPT | Performed by: INTERNAL MEDICINE

## 2024-07-23 PROCEDURE — 85025 COMPLETE CBC W/AUTO DIFF WBC: CPT | Performed by: INTERNAL MEDICINE

## 2024-07-23 PROCEDURE — 84481 FREE ASSAY (FT-3): CPT | Performed by: INTERNAL MEDICINE

## 2024-07-23 PROCEDURE — 83002 ASSAY OF GONADOTROPIN (LH): CPT | Performed by: INTERNAL MEDICINE

## 2024-07-23 PROCEDURE — 36415 COLL VENOUS BLD VENIPUNCTURE: CPT | Mod: PO | Performed by: INTERNAL MEDICINE

## 2024-07-31 LAB
ALBUMIN SERPL-MCNC: 3.6 G/DL (ref 3.6–5.1)
SHBG SERPL-SCNC: 79 NMOL/L (ref 22–77)

## 2024-08-01 ENCOUNTER — HOSPITAL ENCOUNTER (OUTPATIENT)
Dept: CARDIOLOGY | Facility: HOSPITAL | Age: 79
Discharge: HOME | End: 2024-08-01
Attending: INTERNAL MEDICINE
Payer: MEDICARE

## 2024-08-01 ENCOUNTER — OFFICE VISIT (OUTPATIENT)
Dept: CARDIOLOGY | Facility: CLINIC | Age: 79
End: 2024-08-01
Payer: MEDICARE

## 2024-08-01 VITALS
WEIGHT: 161.81 LBS | BODY MASS INDEX: 24.52 KG/M2 | DIASTOLIC BLOOD PRESSURE: 74 MMHG | HEIGHT: 68 IN | HEART RATE: 61 BPM | SYSTOLIC BLOOD PRESSURE: 170 MMHG

## 2024-08-01 DIAGNOSIS — I10 PRIMARY HYPERTENSION: ICD-10-CM

## 2024-08-01 DIAGNOSIS — I25.10 CORONARY ARTERY DISEASE INVOLVING NATIVE CORONARY ARTERY WITHOUT ANGINA PECTORIS, UNSPECIFIED WHETHER NATIVE OR TRANSPLANTED HEART: ICD-10-CM

## 2024-08-01 DIAGNOSIS — I49.5 SSS (SICK SINUS SYNDROME): ICD-10-CM

## 2024-08-01 DIAGNOSIS — Z95.0 PRESENCE OF CARDIAC PACEMAKER: ICD-10-CM

## 2024-08-01 DIAGNOSIS — Z95.5 S/P CORONARY ARTERY STENT PLACEMENT: Primary | ICD-10-CM

## 2024-08-01 PROCEDURE — 3078F DIAST BP <80 MM HG: CPT | Mod: CPTII,S$GLB,, | Performed by: INTERNAL MEDICINE

## 2024-08-01 PROCEDURE — 1126F AMNT PAIN NOTED NONE PRSNT: CPT | Mod: CPTII,S$GLB,, | Performed by: INTERNAL MEDICINE

## 2024-08-01 PROCEDURE — 99999 PR PBB SHADOW E&M-EST. PATIENT-LVL IV: CPT | Mod: PBBFAC,,, | Performed by: INTERNAL MEDICINE

## 2024-08-01 PROCEDURE — 1101F PT FALLS ASSESS-DOCD LE1/YR: CPT | Mod: CPTII,S$GLB,, | Performed by: INTERNAL MEDICINE

## 2024-08-01 PROCEDURE — 1159F MED LIST DOCD IN RCRD: CPT | Mod: CPTII,S$GLB,, | Performed by: INTERNAL MEDICINE

## 2024-08-01 PROCEDURE — 93280 PM DEVICE PROGR EVAL DUAL: CPT | Mod: PO

## 2024-08-01 PROCEDURE — 99214 OFFICE O/P EST MOD 30 MIN: CPT | Mod: S$GLB,,, | Performed by: INTERNAL MEDICINE

## 2024-08-01 PROCEDURE — 3077F SYST BP >= 140 MM HG: CPT | Mod: CPTII,S$GLB,, | Performed by: INTERNAL MEDICINE

## 2024-08-01 PROCEDURE — 3288F FALL RISK ASSESSMENT DOCD: CPT | Mod: CPTII,S$GLB,, | Performed by: INTERNAL MEDICINE

## 2024-08-01 RX ORDER — AMLODIPINE BESYLATE 5 MG/1
5 TABLET ORAL DAILY
Qty: 30 TABLET | Refills: 11 | Status: SHIPPED | OUTPATIENT
Start: 2024-08-01 | End: 2025-08-01

## 2024-08-01 RX ORDER — PRAVASTATIN SODIUM 40 MG/1
40 TABLET ORAL DAILY
Qty: 90 TABLET | Refills: 2 | Status: SHIPPED | OUTPATIENT
Start: 2024-08-01

## 2024-08-01 RX ORDER — TELMISARTAN 20 MG/1
20 TABLET ORAL DAILY
Qty: 90 TABLET | Refills: 3 | Status: SHIPPED | OUTPATIENT
Start: 2024-08-01 | End: 2025-08-01

## 2024-08-01 NOTE — PROGRESS NOTES
Subjective:    Patient ID:  José Miguel Austin is a 79 y.o. male who presents for follow-up of cad, sss    HPI  He comes for follow up with no major problems, no chest pain, no shortness of breath.  He reports he is somewhat depressed and he does not understand why.    Blood pressure at home is normal.  No chest pain.  No syncope or near-syncope.    Review of Systems   Constitutional: Negative for decreased appetite, malaise/fatigue, weight gain and weight loss.   Cardiovascular:  Negative for chest pain, dyspnea on exertion, leg swelling, palpitations and syncope.   Respiratory:  Negative for cough and shortness of breath.    Gastrointestinal: Negative.    Neurological:  Negative for weakness.   All other systems reviewed and are negative.       Objective:      Physical Exam  Vitals and nursing note reviewed.   Constitutional:       Appearance: Normal appearance. He is well-developed.   HENT:      Head: Normocephalic.   Eyes:      Pupils: Pupils are equal, round, and reactive to light.   Neck:      Thyroid: No thyromegaly.      Vascular: No carotid bruit or JVD.   Cardiovascular:      Rate and Rhythm: Normal rate and regular rhythm.      Chest Wall: PMI is not displaced.      Pulses: Normal pulses and intact distal pulses.      Heart sounds: Normal heart sounds. No murmur heard.     No gallop.   Pulmonary:      Effort: Pulmonary effort is normal.      Breath sounds: Normal breath sounds.   Abdominal:      Palpations: Abdomen is soft. There is no mass.      Tenderness: There is no abdominal tenderness.   Musculoskeletal:         General: Normal range of motion.      Cervical back: Normal range of motion and neck supple.   Skin:     General: Skin is warm.   Neurological:      Mental Status: He is alert and oriented to person, place, and time.      Sensory: No sensory deficit.      Deep Tendon Reflexes: Reflexes are normal and symmetric.             Most Recent EKG Results      Most Recent Echocardiogram Results  Results  for orders placed in visit on 08/01/23    Echo    Interpretation Summary    Left Ventricle: The left ventricle is normal in size. Normal wall thickness. Normal wall motion. There is low normal systolic function with a visually estimated ejection fraction of 50 - 55%. There is normal diastolic function.    Right Ventricle: Normal right ventricular cavity size. Systolic function is normal.    Aortic Valve: There is mild aortic regurgitation.    Mitral Valve: There is mild bileaflet sclerosis. There is mild regurgitation.    IVC/SVC: Intermediate venous pressure at 8 mmHg.    The estimated PA systolic pressure is at least 32 mmHg.    Pericardium: There is no pericardial effusion.      Most Recent Nuclear Stress Test Results  Results for orders placed during the hospital encounter of 08/01/23    Nuclear Stress - Cardiology Interpreted    Interpretation Summary    Normal myocardial perfusion scan. There is no evidence of myocardial ischemia or infarction.    There is a  moderate intensity fixed perfusion abnormality in the inferior wall of the left ventricle secondary to diaphragm attenuation.    The gated perfusion images showed an ejection fraction of 59% at rest.    There is normal wall motion at rest.    The ECG portion of the study is negative for ischemia.    The patient reported no chest pain during the stress test.    When compared to the previous study from 10/1/2020, there are no significant changes.      Most Recent Cardiac PET Stress Test Results  No results found for this or any previous visit.      Most Recent Cardiovascular Angiogram results  No results found for this or any previous visit.      Other Most Recent Cardiology Results  Results for orders placed in visit on 07/10/24    Cardiac device check - Remote      Labs reviewed    Assessment:       1. S/P coronary artery stent placement    2. Coronary artery disease involving native coronary artery without angina pectoris, unspecified whether native or  transplanted heart    3. SSS (sick sinus syndrome)    4. Primary hypertension         Plan:     Continue:  ARB, Calcium blocker, and Statin  Regular exercise program  Weight loss  Low cholesterol diet    Follow-up in 1 year or sooner if necessary

## 2024-08-08 ENCOUNTER — TELEPHONE (OUTPATIENT)
Dept: UROLOGY | Facility: CLINIC | Age: 79
End: 2024-08-08
Payer: MEDICARE

## 2024-08-13 ENCOUNTER — TELEPHONE (OUTPATIENT)
Dept: UROLOGY | Facility: CLINIC | Age: 79
End: 2024-08-13
Payer: MEDICARE

## 2024-08-13 DIAGNOSIS — N40.1 BENIGN PROSTATIC HYPERPLASIA WITH URINARY OBSTRUCTION: ICD-10-CM

## 2024-08-13 DIAGNOSIS — N13.8 BENIGN PROSTATIC HYPERPLASIA WITH URINARY OBSTRUCTION: ICD-10-CM

## 2024-08-13 RX ORDER — TAMSULOSIN HYDROCHLORIDE 0.4 MG/1
0.4 CAPSULE ORAL NIGHTLY
Qty: 30 CAPSULE | Refills: 11 | Status: SHIPPED | OUTPATIENT
Start: 2024-08-13 | End: 2025-08-13

## 2024-08-13 NOTE — TELEPHONE ENCOUNTER
----- Message from Amirah Ortega MA sent at 8/13/2024  9:06 AM CDT -----  Pt requesting refill on flomax  ----- Message -----  From: Jorge Tadeo  Sent: 8/13/2024   8:40 AM CDT  To: [facility] Staff    Type: Needs Medical Advice  Who Called:  pt  Best Call Back Number: [phone]    Additional Information: Pt is calling the office regarding tamsulosin (FLOMAX) 0.4 mg Cap needs refilled bottle says no refills and pharmacy says no refills.Please call back and advise.

## 2024-09-02 DIAGNOSIS — I10 PRIMARY HYPERTENSION: Primary | ICD-10-CM

## 2024-09-03 RX ORDER — TELMISARTAN 20 MG/1
20 TABLET ORAL
Qty: 90 TABLET | Refills: 3 | Status: SHIPPED | OUTPATIENT
Start: 2024-09-03

## 2024-09-06 DIAGNOSIS — F32.A DEPRESSION, UNSPECIFIED DEPRESSION TYPE: ICD-10-CM

## 2024-09-06 RX ORDER — BUPROPION HYDROCHLORIDE 300 MG/1
300 TABLET ORAL
Qty: 90 TABLET | Refills: 3 | Status: SHIPPED | OUTPATIENT
Start: 2024-09-06

## 2024-09-06 NOTE — TELEPHONE ENCOUNTER
No care due was identified.  [facility] Embedded Care Due Messages. Reference number: [id number].   9/06/2024 8:15:26 AM CDT

## 2024-09-06 NOTE — TELEPHONE ENCOUNTER
Refill Routing Note   Medication(s) are not appropriate for processing by [facility] for the following reason(s):      Required vitals abnormal    [facility] action(s):  Defer Care Due:  None identified            Appointments  past 12m or future 3m with PCP    Date Provider   Last Visit   2/27/2024 Matias Dickey MD   Next Visit   Visit date not found Matias Dickey MD   ED visits in past 90 days: 0        Note composed:1:43 PM 09/06/2024

## 2024-10-25 ENCOUNTER — CLINICAL SUPPORT (OUTPATIENT)
Dept: CARDIOLOGY | Facility: HOSPITAL | Age: 79
End: 2024-10-25
Payer: MEDICARE

## 2024-10-25 ENCOUNTER — HOSPITAL ENCOUNTER (OUTPATIENT)
Dept: CARDIOLOGY | Facility: HOSPITAL | Age: 79
Discharge: HOME | End: 2024-10-25
Attending: INTERNAL MEDICINE
Payer: MEDICARE

## 2024-10-25 DIAGNOSIS — Z95.0 PRESENCE OF CARDIAC PACEMAKER: ICD-10-CM

## 2024-10-25 PROCEDURE — 93294 REM INTERROG EVL PM/LDLS PM: CPT | Mod: ,,, | Performed by: INTERNAL MEDICINE

## 2024-10-25 PROCEDURE — 93296 REM INTERROG EVL PM/IDS: CPT | Mod: PO | Performed by: INTERNAL MEDICINE

## 2024-10-31 LAB
OHS CV AF BURDEN PERCENT: < 1
OHS CV DC REMOTE DEVICE TYPE: NORMAL
OHS CV RV PACING PERCENT: 1 %

## 2025-01-02 ENCOUNTER — OFFICE VISIT (OUTPATIENT)
Dept: FAMILY MEDICINE | Facility: CLINIC | Age: 80
End: 2025-01-02
Payer: MEDICARE

## 2025-01-02 VITALS
OXYGEN SATURATION: 99 % | SYSTOLIC BLOOD PRESSURE: 138 MMHG | BODY MASS INDEX: 25.58 KG/M2 | HEART RATE: 66 BPM | DIASTOLIC BLOOD PRESSURE: 76 MMHG | WEIGHT: 168.19 LBS

## 2025-01-02 DIAGNOSIS — I49.5 SSS (SICK SINUS SYNDROME): ICD-10-CM

## 2025-01-02 DIAGNOSIS — F32.A DEPRESSION, UNSPECIFIED DEPRESSION TYPE: Primary | ICD-10-CM

## 2025-01-02 DIAGNOSIS — Z00.00 PREVENTATIVE HEALTH CARE: ICD-10-CM

## 2025-01-02 DIAGNOSIS — I10 PRIMARY HYPERTENSION: ICD-10-CM

## 2025-01-02 PROCEDURE — 99999 PR PBB SHADOW E&M-EST. PATIENT-LVL IV: CPT | Mod: PBBFAC,,, | Performed by: FAMILY MEDICINE

## 2025-01-02 RX ORDER — ESCITALOPRAM OXALATE 10 MG/1
10 TABLET ORAL DAILY
Qty: 90 TABLET | Refills: 3 | Status: SHIPPED | OUTPATIENT
Start: 2025-01-02 | End: 2026-01-02

## 2025-01-02 NOTE — PROGRESS NOTES
Subjective:       Patient ID: José Miguel Austin is a 79 y.o. male.    Chief Complaint: Depression    C/o some ongoing depression.  He is increasingly apathetic and unmotivated.   Appetite normal.  Taking Wellbutrin 300mg daily.    He lives alone with not many friends.      1.  Little interest or pleasure in doing things: Nearly every day           = 3  2.  Feeling down, depressed or hopeless: Nearly every day           = 3  3.  Trouble falling or staying asleep, or sleeping too much: Nearly every day           = 3   sleeping more during the day  4.  Feeling tired or having little energy: Several days                = 1  5.  Poor appetite or overeating: Not at all                       = 0  6.  Feeling bad about yourself - or that you are a failure or have let yourself or your family down: Not at all                       = 0  7.  Trouble concentrating on things, such as reading the newspaper or watching television: Not at all                       = 0  8.  Moving or speaking so slowly that other people could have noticed.  Or the opposite - being fidgety or restless that you have been moving around a lot more than usual: Several days                = 1  9.  Thoughts that you would be better off dead, or of hurting yourself: Not at all                       = 0   PHQ-9 Total:  10    Following with Dr. Barrientos for low testosterone    CAD s/p stents,SSS, HTN - following with Dr. Rothman, tolerating Pravachol 40mg daily, amlodipine 5mg, Micardis  Hypogonadism - following with urology, endocrinology  Chronic back pain - s/p lumbar surgery (discectomy) in 1998; he has no reflex in the left leg.  BPH - c/o incoplete bladder emptying; following with Dr. Tyler. Trying flomax with good results. Urolift discussed.           Past Medical History:   Diagnosis Date    Basal cell carcinoma     CAD (coronary artery disease)     Hypertension        Past Surgical History:   Procedure Laterality Date    APPENDECTOMY      BASAL CELL  CARCINOMA EXCISION      left forehead    CARDIAC PACEMAKER PLACEMENT      CIRCUMCISION      CORONARY STENT PLACEMENT      4 stents in RCA    inguinal hernia      LUMBAR DISC SURGERY  1998       Review of patient's allergies indicates:   Allergen Reactions    Gabapentin Hallucinations    Hydrocodone Nausea Only    Codeine Nausea Only    Lyrica [pregabalin] Nausea Only     Nausea and dizziness       Social History     Socioeconomic History    Marital status:     Number of children: 3   Occupational History    Occupation: retired refinery     Occupation: 8,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     Comment: resting heart rate 50    Occupation: [place]    Occupation: 3 times a week gym   Tobacco Use    Smoking status: Never    Smokeless tobacco: Never   Substance and Sexual Activity    Alcohol use: No    Drug use: No   Social History Narrative    Exercise: gym 2-3 times per week        Cycles 5 days a week     Social Drivers of Health     Financial Resource Strain: Low Risk  (5/31/2019)    Overall Financial Resource Strain (CARDIA)     Difficulty of Paying Living Expenses: Not very hard   Food Insecurity: No Food Insecurity (5/31/2019)    Hunger Vital Sign     Worried About Running Out of Food in the Last Year: Never true     Ran Out of Food in the Last Year: Never true   Transportation Needs: No Transportation Needs (5/31/2019)    PRAPARE - Transportation     Lack of Transportation (Medical): No     Lack of Transportation (Non-Medical): No   Physical Activity: Sufficiently Active (5/31/2019)    Exercise Vital Sign     Days of Exercise per Week: 7 days     Minutes of Exercise per Session: 60 min   Stress: Stress Concern Present (5/31/2019)    [employer] [place] [employer] - Occupational Stress Questionnaire     Feeling of Stress : Very much       Current Outpatient Medications on File Prior to Visit   Medication Sig Dispense Refill    amLODIPine (NORVASC) 5 MG tablet  Take 1 tablet (5 mg total) by mouth once daily. 30 tablet 11    buPROPion (WELLBUTRIN XL) 300 MG 24 hr tablet TAKE 1 TABLET(300 MG) BY MOUTH EVERY DAY 90 tablet 3    cholecalciferol, vitamin D3, 125 mcg (5,000 unit) Tab Take 1 tablet by mouth once daily.      coenzyme Q10 100 mg capsule Take 100 mg by mouth once daily.      fish oil-omega-3 fatty acids 300-1,000 mg capsule Take by mouth once daily.      flaxseed oiL 1,000 mg Cap Take 1 capsule by mouth once daily.      mirabegron (MYRBETRIQ) 25 mg Tb24 ER tablet Take 1 tablet (25 mg total) by mouth once daily. 30 tablet 11    multivitamin capsule Take 1 capsule by mouth once daily.      papaverine 30 mg/mL injection Add phentolamine 1 mg/mL, add alprostadil 20 micrograms/mL 10 mL 11    prasterone, dhea, 50 mg Tab Take by mouth once daily.      pravastatin (PRAVACHOL) 40 MG tablet Take 1 tablet (40 mg total) by mouth once daily. 90 tablet 2    tamsulosin (FLOMAX) 0.4 mg Cap Take 1 capsule (0.4 mg total) by mouth every evening. 30 capsule 11    telmisartan (MICARDIS) 20 MG Tab TAKE 1 TABLET(20 MG) BY MOUTH EVERY DAY 90 tablet 3    testosterone cypionate (DEPOTESTOTERONE CYPIONATE) 200 mg/mL injection INJECT 1.5 ML IN THE MUSCLE EVERY 2 WEEKS      acetaminophen (TYLENOL) 500 MG tablet Take 1,000 mg by mouth every 8 (eight) hours. (Patient not taking: Reported on 1/2/2025)      ammonium lactate (LAC-HYDRIN) 12 % lotion Apply topically 2 (two) times daily. (Patient not taking: Reported on 1/2/2025)      celecoxib (CELEBREX) 200 MG capsule TAKE 1 CAPSULE BY MOUTH EVERY DAY FOR 7 DAYS AS NEEDED (Patient not taking: Reported on 1/2/2025)      DOCOSAHEXAENOIC ACID ORAL Take 2 capsules by mouth once daily. (Patient not taking: Reported on 1/2/2025)      ibuprofen (ADVIL,MOTRIN) 800 MG tablet Take 800 mg by mouth 3 (three) times daily. (Patient not taking: Reported on 1/2/2025)       No current facility-administered medications on file prior to visit.       Family History    Problem Relation Name Age of Onset    Heart disease Mother      Heart disease Father      Heart disease Sister      Heart disease Brother         Review of Systems   Constitutional:  Negative for appetite change, chills, fever and unexpected weight change.   HENT:  Negative for sore throat and trouble swallowing.    Eyes:  Negative for pain and visual disturbance.   Respiratory:  Negative for cough, chest tightness, shortness of breath and wheezing.    Cardiovascular:  Negative for chest pain, palpitations and leg swelling.   Gastrointestinal:  Negative for abdominal pain, blood in stool, constipation, diarrhea and nausea.   Genitourinary:  Negative for difficulty urinating, dysuria and hematuria.   Musculoskeletal:  Negative for arthralgias, gait problem and neck pain.   Skin:  Negative for rash and wound.   Neurological:  Negative for dizziness, weakness, light-headedness and headaches.   Hematological:  Negative for adenopathy.   Psychiatric/Behavioral:  Positive for dysphoric mood.        Objective:      /76   Pulse 66   Wt 76.3 kg (168 lb 3.4 oz)   SpO2 99%   BMI 25.58 kg/m²   Physical Exam  Constitutional:       General: He is not in acute distress.     Appearance: He is well-developed.   HENT:      Head: Normocephalic and atraumatic.      Right Ear: External ear normal.      Left Ear: External ear normal.      Mouth/Throat:      Pharynx: Uvula midline. No oropharyngeal exudate.   Eyes:      General: Lids are normal.      Conjunctiva/sclera: Conjunctivae normal.      Pupils: Pupils are equal, round, and reactive to light.   Neck:      Thyroid: No thyroid mass or thyromegaly.      Trachea: Phonation normal.   Cardiovascular:      Rate and Rhythm: Normal rate and regular rhythm.      Heart sounds: Normal heart sounds. No murmur heard.     No friction rub. No gallop.   Pulmonary:      Effort: Pulmonary effort is normal. No respiratory distress.      Breath sounds: Normal breath sounds. No wheezing or  rales.   Musculoskeletal:         General: Normal range of motion.      Cervical back: Full passive range of motion without pain, normal range of motion and neck supple.   Lymphadenopathy:      Cervical: No cervical adenopathy.   Skin:     General: Skin is warm and dry.   Neurological:      Mental Status: He is alert and oriented to person, place, and time.      Cranial Nerves: No cranial nerve deficit.      Coordination: Coordination normal.   Psychiatric:         Attention and Perception: Attention normal.         Mood and Affect: Affect is blunt.         Speech: Speech normal.         Behavior: Behavior normal.         Thought Content: Thought content normal.         Cognition and Memory: Cognition normal.         Judgment: Judgment normal.         Assessment:       1. Depression, unspecified depression type    2. Preventative health care    3. Primary hypertension    4. SSS (sick sinus syndrome)        Plan:       Depression, unspecified depression type  -     EScitalopram oxalate (LEXAPRO) 10 MG tablet; Take 1 tablet (10 mg total) by mouth once daily.  Dispense: 90 tablet; Refill: 3    Preventative health care  -     influenza (adjuvanted) (Fluad) 45 mcg/0.5 mL IM vaccine (> or = 66 yo) 0.5 mL    Primary hypertension    SSS (sick sinus syndrome)        Continue Wellbutrin XL 300mg  Begin Lexapro 10mg  Continue other present meds and cardiology, endocrine f/u  Counseled on regular exercise, maintenance of a healthy weight, balanced diet rich in fruits/vegetables and lean protein, and avoidance of unhealthy habits like smoking and excessive alcohol intake.  F/u 1 month    Visit today included increased complexity associated with the care of the episodic problems listed above addressed and managing the longitudinal care of the patient due to the serious and/or complex managed problem(s) listed above.

## 2025-01-24 ENCOUNTER — HOSPITAL ENCOUNTER (OUTPATIENT)
Dept: CARDIOLOGY | Facility: HOSPITAL | Age: 80
Discharge: HOME | End: 2025-01-24
Attending: INTERNAL MEDICINE
Payer: MEDICARE

## 2025-01-24 ENCOUNTER — CLINICAL SUPPORT (OUTPATIENT)
Dept: CARDIOLOGY | Facility: HOSPITAL | Age: 80
End: 2025-01-24
Payer: MEDICARE

## 2025-01-24 DIAGNOSIS — Z95.0 PRESENCE OF CARDIAC PACEMAKER: ICD-10-CM

## 2025-01-24 PROCEDURE — 93294 REM INTERROG EVL PM/LDLS PM: CPT | Mod: ,,, | Performed by: INTERNAL MEDICINE

## 2025-01-24 PROCEDURE — 93296 REM INTERROG EVL PM/IDS: CPT | Mod: PO | Performed by: INTERNAL MEDICINE

## 2025-02-03 LAB
OHS CV AF BURDEN PERCENT: < 1
OHS CV DC REMOTE DEVICE TYPE: NORMAL
OHS CV RV PACING PERCENT: 1 %

## 2025-02-04 ENCOUNTER — OFFICE VISIT (OUTPATIENT)
Dept: FAMILY MEDICINE | Facility: CLINIC | Age: 80
End: 2025-02-04
Payer: MEDICARE

## 2025-02-04 VITALS
OXYGEN SATURATION: 98 % | HEART RATE: 67 BPM | HEIGHT: 68 IN | BODY MASS INDEX: 25.9 KG/M2 | WEIGHT: 170.88 LBS | SYSTOLIC BLOOD PRESSURE: 128 MMHG | DIASTOLIC BLOOD PRESSURE: 72 MMHG

## 2025-02-04 DIAGNOSIS — F32.A DEPRESSION, UNSPECIFIED DEPRESSION TYPE: Primary | ICD-10-CM

## 2025-02-04 DIAGNOSIS — I10 PRIMARY HYPERTENSION: ICD-10-CM

## 2025-02-04 PROCEDURE — 1159F MED LIST DOCD IN RCRD: CPT | Mod: CPTII,S$GLB,, | Performed by: FAMILY MEDICINE

## 2025-02-04 PROCEDURE — 1126F AMNT PAIN NOTED NONE PRSNT: CPT | Mod: CPTII,S$GLB,, | Performed by: FAMILY MEDICINE

## 2025-02-04 PROCEDURE — 3074F SYST BP LT 130 MM HG: CPT | Mod: CPTII,S$GLB,, | Performed by: FAMILY MEDICINE

## 2025-02-04 PROCEDURE — 3078F DIAST BP <80 MM HG: CPT | Mod: CPTII,S$GLB,, | Performed by: FAMILY MEDICINE

## 2025-02-04 PROCEDURE — 3288F FALL RISK ASSESSMENT DOCD: CPT | Mod: CPTII,S$GLB,, | Performed by: FAMILY MEDICINE

## 2025-02-04 PROCEDURE — 99214 OFFICE O/P EST MOD 30 MIN: CPT | Mod: S$GLB,,, | Performed by: FAMILY MEDICINE

## 2025-02-04 PROCEDURE — 1101F PT FALLS ASSESS-DOCD LE1/YR: CPT | Mod: CPTII,S$GLB,, | Performed by: FAMILY MEDICINE

## 2025-02-04 PROCEDURE — 99999 PR PBB SHADOW E&M-EST. PATIENT-LVL III: CPT | Mod: PBBFAC,,, | Performed by: FAMILY MEDICINE

## 2025-02-04 RX ORDER — SERTRALINE HYDROCHLORIDE 50 MG/1
50 TABLET, FILM COATED ORAL DAILY
Qty: 30 TABLET | Refills: 11 | Status: SHIPPED | OUTPATIENT
Start: 2025-02-04 | End: 2026-02-04

## 2025-02-04 NOTE — PROGRESS NOTES
Subjective:       Patient ID: José Miguel Austin is a 79 y.o. male.    Chief Complaint: Depression (4 week follow up )    Here for f/u on depression.  We initiated Lexapro at last visit. Denies side effects with this. He has not noticed any improvement.  He is persistently apathetic and unmotivated.   Appetite normal.  Also taking Wellbutrin 300mg daily.    He lives alone with not many friends.    Following with Dr. Barrientos for low testosterone    CAD s/p stents,SSS, HTN - following with Dr. Rothman, tolerating Pravachol 40mg daily, amlodipine 5mg, Micardis  Hypogonadism - following with urology, endocrinology  Chronic back pain - s/p lumbar surgery (discectomy) in 1998; he has no reflex in the left leg.  BPH - c/o incoplete bladder emptying; following with Dr. Tyler. Trying flomax with good results. Urolift discussed.           Past Medical History:   Diagnosis Date    Basal cell carcinoma     CAD (coronary artery disease)     Hypertension        Past Surgical History:   Procedure Laterality Date    APPENDECTOMY      BASAL CELL CARCINOMA EXCISION      left forehead    CARDIAC PACEMAKER PLACEMENT      CIRCUMCISION      CORONARY STENT PLACEMENT      4 stents in RCA    inguinal hernia      LUMBAR DISC SURGERY  1998       Review of patient's allergies indicates:   Allergen Reactions    Gabapentin Hallucinations    Hydrocodone Nausea Only    Codeine Nausea Only    Lyrica [pregabalin] Nausea Only     Nausea and dizziness       Social History     Socioeconomic History    Marital status:     Number of children: 3   Occupational History    Occupation: retired refinery     Occupation: 8,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     Comment: resting heart rate 50    Occupation: [place]    Occupation: 3 times a week gym   Tobacco Use    Smoking status: Never    Smokeless tobacco: Never   Substance and Sexual Activity    Alcohol use: No    Drug use: No   Social History Narrative    Exercise:  gym 2-3 times per week        Cycles 5 days a week     Social Drivers of Health     Financial Resource Strain: Low Risk  (5/31/2019)    Overall Financial Resource Strain (CARDIA)     Difficulty of Paying Living Expenses: Not very hard   Food Insecurity: No Food Insecurity (5/31/2019)    Hunger Vital Sign     Worried About Running Out of Food in the Last Year: Never true     Ran Out of Food in the Last Year: Never true   Transportation Needs: No Transportation Needs (5/31/2019)    PRAPARE - Transportation     Lack of Transportation (Medical): No     Lack of Transportation (Non-Medical): No   Physical Activity: Sufficiently Active (5/31/2019)    Exercise Vital Sign     Days of Exercise per Week: 7 days     Minutes of Exercise per Session: 60 min   Stress: Stress Concern Present (5/31/2019)    [employer] [employer] - Occupational Stress Questionnaire     Feeling of Stress : Very much       Current Outpatient Medications on File Prior to Visit   Medication Sig Dispense Refill    acetaminophen (TYLENOL) 500 MG tablet Take 1,000 mg by mouth every 8 (eight) hours.      amLODIPine (NORVASC) 5 MG tablet Take 1 tablet (5 mg total) by mouth once daily. 30 tablet 11    buPROPion (WELLBUTRIN XL) 300 MG 24 hr tablet TAKE 1 TABLET(300 MG) BY MOUTH EVERY DAY 90 tablet 3    cholecalciferol, vitamin D3, 125 mcg (5,000 unit) Tab Take 1 tablet by mouth once daily.      coenzyme Q10 100 mg capsule Take 100 mg by mouth once daily.      DOCOSAHEXAENOIC ACID ORAL Take 2 capsules by mouth once daily.      fish oil-omega-3 fatty acids 300-1,000 mg capsule Take by mouth once daily.      mirabegron (MYRBETRIQ) 25 mg Tb24 ER tablet Take 1 tablet (25 mg total) by mouth once daily. 30 tablet 11    multivitamin capsule Take 1 capsule by mouth once daily.      papaverine 30 mg/mL injection Add phentolamine 1 mg/mL, add alprostadil 20 micrograms/mL 10 mL 11    prasterone, dhea, 50 mg Tab Take by mouth once daily.      pravastatin  (PRAVACHOL) 40 MG tablet Take 1 tablet (40 mg total) by mouth once daily. 90 tablet 2    tamsulosin (FLOMAX) 0.4 mg Cap Take 1 capsule (0.4 mg total) by mouth every evening. 30 capsule 11    telmisartan (MICARDIS) 20 MG Tab TAKE 1 TABLET(20 MG) BY MOUTH EVERY DAY 90 tablet 3    testosterone cypionate (DEPOTESTOTERONE CYPIONATE) 200 mg/mL injection INJECT 1.5 ML IN THE MUSCLE EVERY 2 WEEKS      [DISCONTINUED] EScitalopram oxalate (LEXAPRO) 10 MG tablet Take 1 tablet (10 mg total) by mouth once daily. 90 tablet 3    ammonium lactate (LAC-HYDRIN) 12 % lotion Apply topically 2 (two) times daily. (Patient not taking: Reported on 2/4/2025)      celecoxib (CELEBREX) 200 MG capsule TAKE 1 CAPSULE BY MOUTH EVERY DAY FOR 7 DAYS AS NEEDED (Patient not taking: Reported on 8/1/2024)      flaxseed oiL 1,000 mg Cap Take 1 capsule by mouth once daily. (Patient not taking: Reported on 2/4/2025)      ibuprofen (ADVIL,MOTRIN) 800 MG tablet Take 800 mg by mouth 3 (three) times daily. (Patient not taking: Reported on 2/4/2025)       No current facility-administered medications on file prior to visit.       Family History   Problem Relation Name Age of Onset    Heart disease Mother      Heart disease Father      Heart disease Sister      Heart disease Brother         Review of Systems   Constitutional:  Negative for appetite change, chills, fever and unexpected weight change.   HENT:  Negative for sore throat and trouble swallowing.    Eyes:  Negative for pain and visual disturbance.   Respiratory:  Negative for cough, chest tightness, shortness of breath and wheezing.    Cardiovascular:  Negative for chest pain, palpitations and leg swelling.   Gastrointestinal:  Negative for abdominal pain, blood in stool, constipation, diarrhea and nausea.   Genitourinary:  Negative for difficulty urinating, dysuria and hematuria.   Musculoskeletal:  Negative for arthralgias, gait problem and neck pain.   Skin:  Negative for rash and wound.  "  Neurological:  Negative for dizziness, weakness, light-headedness and headaches.   Hematological:  Negative for adenopathy.   Psychiatric/Behavioral:  Positive for dysphoric mood.        Objective:      /72   Pulse 67   Ht 5' 8" (1.727 m)   Wt 77.5 kg (170 lb 13.7 oz)   SpO2 98%   BMI 25.98 kg/m²   Physical Exam  Constitutional:       General: He is not in acute distress.     Appearance: He is well-developed.   HENT:      Head: Normocephalic and atraumatic.      Right Ear: External ear normal.      Left Ear: External ear normal.      Mouth/Throat:      Pharynx: Uvula midline. No oropharyngeal exudate.   Eyes:      General: Lids are normal.      Conjunctiva/sclera: Conjunctivae normal.      Pupils: Pupils are equal, round, and reactive to light.   Neck:      Thyroid: No thyroid mass or thyromegaly.      Trachea: Phonation normal.   Cardiovascular:      Rate and Rhythm: Normal rate and regular rhythm.      Heart sounds: Normal heart sounds. No murmur heard.     No friction rub. No gallop.   Pulmonary:      Effort: Pulmonary effort is normal. No respiratory distress.      Breath sounds: Normal breath sounds. No wheezing or rales.   Musculoskeletal:         General: Normal range of motion.      Cervical back: Full passive range of motion without pain, normal range of motion and neck supple.   Lymphadenopathy:      Cervical: No cervical adenopathy.   Skin:     General: Skin is warm and dry.   Neurological:      Mental Status: He is alert and oriented to person, place, and time.      Cranial Nerves: No cranial nerve deficit.      Coordination: Coordination normal.   Psychiatric:         Attention and Perception: Attention normal.         Mood and Affect: Affect is blunt.         Speech: Speech normal.         Behavior: Behavior normal.         Thought Content: Thought content normal.         Cognition and Memory: Cognition normal.         Judgment: Judgment normal.         Assessment:       1. Depression, " unspecified depression type    2. Primary hypertension          Plan:       Depression, unspecified depression type  -     sertraline (ZOLOFT) 50 MG tablet; Take 1 tablet (50 mg total) by mouth once daily.  Dispense: 30 tablet; Refill: 11    Primary hypertension          Continue Wellbutrin XL 300mg  discontinue Lexapro 10mg; begin zoloft 50mg  Continue other present meds and cardiology, endocrine f/u  Counseled on regular exercise, maintenance of a healthy weight, balanced diet rich in fruits/vegetables and lean protein, and avoidance of unhealthy habits like smoking and excessive alcohol intake.  F/u 1 month    Visit today included increased complexity associated with the care of the episodic problems listed above addressed and managing the longitudinal care of the patient due to the serious and/or complex managed problem(s) listed above.

## 2025-02-05 ENCOUNTER — LAB VISIT (OUTPATIENT)
Dept: LAB | Facility: HOSPITAL | Age: 80
End: 2025-02-05
Attending: INTERNAL MEDICINE
Payer: MEDICARE

## 2025-02-05 DIAGNOSIS — Z12.5 SPECIAL SCREENING FOR MALIGNANT NEOPLASM OF PROSTATE: ICD-10-CM

## 2025-02-05 DIAGNOSIS — R53.81 DEBILITY: ICD-10-CM

## 2025-02-05 DIAGNOSIS — M79.10 MYALGIA: ICD-10-CM

## 2025-02-05 DIAGNOSIS — E29.1 3-OXO-5 ALPHA-STEROID DELTA 4-DEHYDROGENASE DEFICIENCY: ICD-10-CM

## 2025-02-05 DIAGNOSIS — E53.8 BIOTIN-(PROPIONYL-COA-CARBOXYLASE) LIGASE DEFICIENCY: Primary | ICD-10-CM

## 2025-02-05 DIAGNOSIS — E78.49 FAMILIAL COMBINED HYPERLIPIDEMIA: ICD-10-CM

## 2025-02-05 DIAGNOSIS — E34.9 TESTOSTERONE DEFICIENCY: ICD-10-CM

## 2025-02-05 DIAGNOSIS — E55.9 AVITAMINOSIS D: ICD-10-CM

## 2025-02-05 LAB
ALBUMIN SERPL BCP-MCNC: 3.4 G/DL (ref 3.5–5.2)
ALP SERPL-CCNC: 81 U/L (ref 40–150)
ALT SERPL W/O P-5'-P-CCNC: 25 U/L (ref 10–44)
ANION GAP SERPL CALC-SCNC: 7 MMOL/L (ref 8–16)
AST SERPL-CCNC: 27 U/L (ref 10–40)
BASOPHILS # BLD AUTO: 0.06 K/UL (ref 0–0.2)
BASOPHILS NFR BLD: 0.7 % (ref 0–1.9)
BILIRUB SERPL-MCNC: 0.7 MG/DL (ref 0.1–1)
BUN SERPL-MCNC: 20 MG/DL (ref 8–23)
CALCIUM SERPL-MCNC: 9.2 MG/DL (ref 8.7–10.5)
CHLORIDE SERPL-SCNC: 103 MMOL/L (ref 95–110)
CHOLEST SERPL-MCNC: 149 MG/DL (ref 120–199)
CHOLEST/HDLC SERPL: 2.7 {RATIO} (ref 2–5)
CO2 SERPL-SCNC: 26 MMOL/L (ref 23–29)
COMPLEXED PSA SERPL-MCNC: 0.64 NG/ML (ref 0–4)
CREAT SERPL-MCNC: 1.2 MG/DL (ref 0.5–1.4)
DIFFERENTIAL METHOD BLD: ABNORMAL
EOSINOPHIL # BLD AUTO: 0.3 K/UL (ref 0–0.5)
EOSINOPHIL NFR BLD: 3 % (ref 0–8)
ERYTHROCYTE [DISTWIDTH] IN BLOOD BY AUTOMATED COUNT: 13.2 % (ref 11.5–14.5)
EST. GFR  (NO RACE VARIABLE): >60 ML/MIN/1.73 M^2
GLUCOSE SERPL-MCNC: 88 MG/DL (ref 70–110)
HCT VFR BLD AUTO: 43.3 % (ref 40–54)
HDLC SERPL-MCNC: 55 MG/DL (ref 40–75)
HDLC SERPL: 36.9 % (ref 20–50)
HGB BLD-MCNC: 14.1 G/DL (ref 14–18)
IMM GRANULOCYTES # BLD AUTO: 0.04 K/UL (ref 0–0.04)
IMM GRANULOCYTES NFR BLD AUTO: 0.5 % (ref 0–0.5)
LDLC SERPL CALC-MCNC: 83.8 MG/DL (ref 63–159)
LH SERPL-ACNC: 4.3 MIU/ML (ref 0.6–12.1)
LYMPHOCYTES # BLD AUTO: 2.8 K/UL (ref 1–4.8)
LYMPHOCYTES NFR BLD: 32.4 % (ref 18–48)
MCH RBC QN AUTO: 33 PG (ref 27–31)
MCHC RBC AUTO-ENTMCNC: 32.6 G/DL (ref 32–36)
MCV RBC AUTO: 101 FL (ref 82–98)
MONOCYTES # BLD AUTO: 1.3 K/UL (ref 0.3–1)
MONOCYTES NFR BLD: 14.8 % (ref 4–15)
NEUTROPHILS # BLD AUTO: 4.2 K/UL (ref 1.8–7.7)
NEUTROPHILS NFR BLD: 48.6 % (ref 38–73)
NONHDLC SERPL-MCNC: 94 MG/DL
NRBC BLD-RTO: 0 /100 WBC
PLATELET # BLD AUTO: 261 K/UL (ref 150–450)
PMV BLD AUTO: 10.3 FL (ref 9.2–12.9)
POTASSIUM SERPL-SCNC: 4.4 MMOL/L (ref 3.5–5.1)
PROT SERPL-MCNC: 6.8 G/DL (ref 6–8.4)
RBC # BLD AUTO: 4.27 M/UL (ref 4.6–6.2)
SODIUM SERPL-SCNC: 136 MMOL/L (ref 136–145)
T3FREE SERPL-MCNC: 2.7 PG/ML (ref 2.3–4.2)
T4 FREE SERPL-MCNC: 0.84 NG/DL (ref 0.71–1.51)
TESTOST SERPL-MCNC: 478 NG/DL (ref 304–1227)
TRIGL SERPL-MCNC: 51 MG/DL (ref 30–150)
TSH SERPL DL<=0.005 MIU/L-ACNC: 2.55 UIU/ML (ref 0.4–4)
WBC # BLD AUTO: 8.53 K/UL (ref 3.9–12.7)

## 2025-02-05 PROCEDURE — 85025 COMPLETE CBC W/AUTO DIFF WBC: CPT | Performed by: INTERNAL MEDICINE

## 2025-02-05 PROCEDURE — 83002 ASSAY OF GONADOTROPIN (LH): CPT | Performed by: INTERNAL MEDICINE

## 2025-02-05 PROCEDURE — 84443 ASSAY THYROID STIM HORMONE: CPT | Performed by: INTERNAL MEDICINE

## 2025-02-05 PROCEDURE — 80061 LIPID PANEL: CPT | Performed by: INTERNAL MEDICINE

## 2025-02-05 PROCEDURE — 80053 COMPREHEN METABOLIC PANEL: CPT | Performed by: INTERNAL MEDICINE

## 2025-02-05 PROCEDURE — 84439 ASSAY OF FREE THYROXINE: CPT | Performed by: INTERNAL MEDICINE

## 2025-02-05 PROCEDURE — 84153 ASSAY OF PSA TOTAL: CPT | Performed by: INTERNAL MEDICINE

## 2025-02-05 PROCEDURE — 36415 COLL VENOUS BLD VENIPUNCTURE: CPT | Mod: PO | Performed by: INTERNAL MEDICINE

## 2025-02-05 PROCEDURE — 84481 FREE ASSAY (FT-3): CPT | Performed by: INTERNAL MEDICINE

## 2025-02-05 PROCEDURE — 84403 ASSAY OF TOTAL TESTOSTERONE: CPT | Performed by: INTERNAL MEDICINE

## 2025-03-05 ENCOUNTER — OFFICE VISIT (OUTPATIENT)
Dept: FAMILY MEDICINE | Facility: CLINIC | Age: 80
End: 2025-03-05
Payer: MEDICARE

## 2025-03-05 VITALS
OXYGEN SATURATION: 99 % | HEIGHT: 68 IN | HEART RATE: 65 BPM | DIASTOLIC BLOOD PRESSURE: 66 MMHG | BODY MASS INDEX: 26.6 KG/M2 | WEIGHT: 175.5 LBS | SYSTOLIC BLOOD PRESSURE: 128 MMHG

## 2025-03-05 DIAGNOSIS — I10 PRIMARY HYPERTENSION: ICD-10-CM

## 2025-03-05 DIAGNOSIS — F32.A DEPRESSION, UNSPECIFIED DEPRESSION TYPE: Primary | ICD-10-CM

## 2025-03-05 PROCEDURE — 3078F DIAST BP <80 MM HG: CPT | Mod: CPTII,S$GLB,, | Performed by: FAMILY MEDICINE

## 2025-03-05 PROCEDURE — 1159F MED LIST DOCD IN RCRD: CPT | Mod: CPTII,S$GLB,, | Performed by: FAMILY MEDICINE

## 2025-03-05 PROCEDURE — 1126F AMNT PAIN NOTED NONE PRSNT: CPT | Mod: CPTII,S$GLB,, | Performed by: FAMILY MEDICINE

## 2025-03-05 PROCEDURE — 99213 OFFICE O/P EST LOW 20 MIN: CPT | Mod: S$GLB,,, | Performed by: FAMILY MEDICINE

## 2025-03-05 PROCEDURE — 99999 PR PBB SHADOW E&M-EST. PATIENT-LVL III: CPT | Mod: PBBFAC,,, | Performed by: FAMILY MEDICINE

## 2025-03-05 PROCEDURE — 3074F SYST BP LT 130 MM HG: CPT | Mod: CPTII,S$GLB,, | Performed by: FAMILY MEDICINE

## 2025-03-05 PROCEDURE — 3288F FALL RISK ASSESSMENT DOCD: CPT | Mod: CPTII,S$GLB,, | Performed by: FAMILY MEDICINE

## 2025-03-05 PROCEDURE — 1101F PT FALLS ASSESS-DOCD LE1/YR: CPT | Mod: CPTII,S$GLB,, | Performed by: FAMILY MEDICINE

## 2025-03-05 NOTE — PROGRESS NOTES
Subjective:       Patient ID: José Miguel Austin is a 79 y.o. male.    Chief Complaint: Depression (Follow up)    Here for f/u on depression.    He is tolerating zoloft for the past month. He also has been inadvertantly taking Lexapro as well.  Lexapro did not give him any improvement.  Appetite normal.  Also taking Wellbutrin 300mg daily.  He has gone to the gym a few times in the last month.    He lives alone with not many friends.    Following with Dr. Barrientos for low testosterone    CAD s/p stents,SSS, HTN - following with Dr. Rothman, tolerating Pravachol 40mg daily, amlodipine 5mg, Micardis  Hypogonadism - following with urology, endocrinology  Chronic back pain - s/p lumbar surgery (discectomy) in 1998; he has no reflex in the left leg.  BPH - c/o incoplete bladder emptying; following with Dr. Tyler. Trying flomax with good results. Urolift discussed.           Past Medical History:   Diagnosis Date    Basal cell carcinoma     CAD (coronary artery disease)     Hypertension        Past Surgical History:   Procedure Laterality Date    APPENDECTOMY      BASAL CELL CARCINOMA EXCISION      left forehead    CARDIAC PACEMAKER PLACEMENT      CIRCUMCISION      CORONARY STENT PLACEMENT      4 stents in RCA    inguinal hernia      LUMBAR DISC SURGERY  1998       Review of patient's allergies indicates:   Allergen Reactions    Gabapentin Hallucinations    Hydrocodone Nausea Only    Codeine Nausea Only    Lyrica [pregabalin] Nausea Only     Nausea and dizziness       Social History     Socioeconomic History    Marital status:     Number of children: 3   Occupational History    Occupation: retired refinery     Occupation: 8,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     Comment: resting heart rate 50    Occupation: cycles in [place]    Occupation: 3 times a week gym   Tobacco Use    Smoking status: Never    Smokeless tobacco: Never   Substance and Sexual Activity    Alcohol use: No    Drug use: No    Social History Narrative    Exercise: gym 2-3 times per week        Cycles 5 days a week     Social Drivers of Health     Financial Resource Strain: Low Risk  (5/31/2019)    Overall Financial Resource Strain (CARDIA)     Difficulty of Paying Living Expenses: Not very hard   Food Insecurity: No Food Insecurity (5/31/2019)    Hunger Vital Sign     Worried About Running Out of Food in the Last Year: Never true     Ran Out of Food in the Last Year: Never true   Transportation Needs: No Transportation Needs (5/31/2019)    PRAPARE - Transportation     Lack of Transportation (Medical): No     Lack of Transportation (Non-Medical): No   Physical Activity: Sufficiently Active (5/31/2019)    Exercise Vital Sign     Days of Exercise per Week: 7 days     Minutes of Exercise per Session: 60 min   Stress: Stress Concern Present (5/31/2019)    [employer] - Occupational Stress Questionnaire     Feeling of Stress : Very much       Current Outpatient Medications on File Prior to Visit   Medication Sig Dispense Refill    acetaminophen (TYLENOL) 500 MG tablet Take 1,000 mg by mouth every 8 (eight) hours.      amLODIPine (NORVASC) 5 MG tablet Take 1 tablet (5 mg total) by mouth once daily. 30 tablet 11    buPROPion (WELLBUTRIN XL) 300 MG 24 hr tablet TAKE 1 TABLET(300 MG) BY MOUTH EVERY DAY 90 tablet 3    cholecalciferol, vitamin D3, 125 mcg (5,000 unit) Tab Take 1 tablet by mouth once daily.      coenzyme Q10 100 mg capsule Take 100 mg by mouth once daily.      fish oil-omega-3 fatty acids 300-1,000 mg capsule Take by mouth once daily.      flaxseed oiL 1,000 mg Cap Take 1 capsule by mouth once daily.      ibuprofen (ADVIL,MOTRIN) 800 MG tablet Take 800 mg by mouth 3 (three) times daily.      mirabegron (MYRBETRIQ) 25 mg Tb24 ER tablet Take 1 tablet (25 mg total) by mouth once daily. 30 tablet 11    multivitamin capsule Take 1 capsule by mouth once daily.      papaverine 30 mg/mL injection Add  phentolamine 1 mg/mL, add alprostadil 20 micrograms/mL 10 mL 11    prasterone, dhea, 50 mg Tab Take by mouth once daily.      pravastatin (PRAVACHOL) 40 MG tablet Take 1 tablet (40 mg total) by mouth once daily. 90 tablet 2    sertraline (ZOLOFT) 50 MG tablet Take 1 tablet (50 mg total) by mouth once daily. 30 tablet 11    tamsulosin (FLOMAX) 0.4 mg Cap Take 1 capsule (0.4 mg total) by mouth every evening. 30 capsule 11    telmisartan (MICARDIS) 20 MG Tab TAKE 1 TABLET(20 MG) BY MOUTH EVERY DAY 90 tablet 3    testosterone cypionate (DEPOTESTOTERONE CYPIONATE) 200 mg/mL injection INJECT 1.5 ML IN THE MUSCLE EVERY 2 WEEKS      ammonium lactate (LAC-HYDRIN) 12 % lotion Apply topically 2 (two) times daily. (Patient not taking: Reported on 1/2/2025)      celecoxib (CELEBREX) 200 MG capsule TAKE 1 CAPSULE BY MOUTH EVERY DAY FOR 7 DAYS AS NEEDED (Patient not taking: Reported on 3/5/2025)      DOCOSAHEXAENOIC ACID ORAL Take 2 capsules by mouth once daily. (Patient not taking: Reported on 3/5/2025)       No current facility-administered medications on file prior to visit.       Family History   Problem Relation Name Age of Onset    Heart disease Mother      Heart disease Father      Heart disease Sister      Heart disease Brother         Review of Systems   Constitutional:  Negative for appetite change, chills, fever and unexpected weight change.   HENT:  Negative for sore throat and trouble swallowing.    Eyes:  Negative for pain and visual disturbance.   Respiratory:  Negative for cough, chest tightness, shortness of breath and wheezing.    Cardiovascular:  Negative for chest pain, palpitations and leg swelling.   Gastrointestinal:  Negative for abdominal pain, blood in stool, constipation, diarrhea and nausea.   Genitourinary:  Negative for difficulty urinating, dysuria and hematuria.   Musculoskeletal:  Negative for arthralgias, gait problem and neck pain.   Skin:  Negative for rash and wound.   Neurological:  Negative  "for dizziness, weakness, light-headedness and headaches.   Hematological:  Negative for adenopathy.   Psychiatric/Behavioral:  Positive for dysphoric mood.        Objective:      /66   Pulse 65   Ht 5' 8" (1.727 m)   Wt 79.6 kg (175 lb 7.8 oz)   SpO2 99%   BMI 26.68 kg/m²   Physical Exam  Constitutional:       General: He is not in acute distress.     Appearance: He is well-developed.   HENT:      Head: Normocephalic and atraumatic.      Right Ear: External ear normal.      Left Ear: External ear normal.      Mouth/Throat:      Pharynx: Uvula midline. No oropharyngeal exudate.   Eyes:      General: Lids are normal.      Conjunctiva/sclera: Conjunctivae normal.      Pupils: Pupils are equal, round, and reactive to light.   Neck:      Thyroid: No thyroid mass or thyromegaly.      Trachea: Phonation normal.   Cardiovascular:      Rate and Rhythm: Normal rate and regular rhythm.      Heart sounds: Normal heart sounds. No murmur heard.     No friction rub. No gallop.   Pulmonary:      Effort: Pulmonary effort is normal. No respiratory distress.      Breath sounds: Normal breath sounds. No wheezing or rales.   Musculoskeletal:         General: Normal range of motion.      Cervical back: Full passive range of motion without pain, normal range of motion and neck supple.   Lymphadenopathy:      Cervical: No cervical adenopathy.   Skin:     General: Skin is warm and dry.   Neurological:      Mental Status: He is alert and oriented to person, place, and time.      Cranial Nerves: No cranial nerve deficit.      Coordination: Coordination normal.   Psychiatric:         Attention and Perception: Attention normal.         Mood and Affect: Affect is blunt.         Speech: Speech normal.         Behavior: Behavior normal.         Thought Content: Thought content normal.         Cognition and Memory: Cognition normal.         Judgment: Judgment normal.         Assessment:       1. Depression, unspecified depression type  "   2. Primary hypertension            Plan:       Depression, unspecified depression type    Primary hypertension            Continue Wellbutrin XL 300mg  continue zoloft 50mg; stop Lexapro  Continue other present meds and cardiology, endocrine f/u  Counseled on regular exercise, maintenance of a healthy weight, balanced diet rich in fruits/vegetables and lean protein, and avoidance of unhealthy habits like smoking and excessive alcohol intake.  F/u 2 month    Visit today included increased complexity associated with the care of the episodic problems listed above addressed and managing the longitudinal care of the patient due to the serious and/or complex managed problem(s) listed above.

## 2025-03-20 DIAGNOSIS — I25.10 CORONARY ARTERY DISEASE INVOLVING NATIVE CORONARY ARTERY WITHOUT ANGINA PECTORIS, UNSPECIFIED WHETHER NATIVE OR TRANSPLANTED HEART: ICD-10-CM

## 2025-03-21 RX ORDER — PRAVASTATIN SODIUM 40 MG/1
40 TABLET ORAL
Qty: 90 TABLET | Refills: 2 | Status: SHIPPED | OUTPATIENT
Start: 2025-03-21

## 2025-03-24 DIAGNOSIS — Z00.00 ENCOUNTER FOR MEDICARE ANNUAL WELLNESS EXAM: ICD-10-CM

## 2025-04-25 ENCOUNTER — CLINICAL SUPPORT (OUTPATIENT)
Dept: CARDIOLOGY | Facility: HOSPITAL | Age: 80
End: 2025-04-25
Payer: MEDICARE

## 2025-04-25 ENCOUNTER — HOSPITAL ENCOUNTER (OUTPATIENT)
Dept: CARDIOLOGY | Facility: HOSPITAL | Age: 80
Discharge: HOME | End: 2025-04-25
Attending: INTERNAL MEDICINE
Payer: MEDICARE

## 2025-04-25 DIAGNOSIS — Z95.0 PRESENCE OF CARDIAC PACEMAKER: ICD-10-CM

## 2025-04-25 PROCEDURE — 93296 REM INTERROG EVL PM/IDS: CPT | Mod: PO | Performed by: INTERNAL MEDICINE

## 2025-04-25 PROCEDURE — 93294 REM INTERROG EVL PM/LDLS PM: CPT | Mod: ,,, | Performed by: INTERNAL MEDICINE

## 2025-04-29 ENCOUNTER — TELEPHONE (OUTPATIENT)
Dept: UROLOGY | Facility: CLINIC | Age: 80
End: 2025-04-29
Payer: MEDICARE

## 2025-05-01 LAB
OHS CV AF BURDEN PERCENT: < 1
OHS CV DC REMOTE DEVICE TYPE: NORMAL
OHS CV RV PACING PERCENT: 1 %

## 2025-05-06 ENCOUNTER — OFFICE VISIT (OUTPATIENT)
Dept: FAMILY MEDICINE | Facility: CLINIC | Age: 80
End: 2025-05-06
Payer: MEDICARE

## 2025-05-06 VITALS
HEART RATE: 68 BPM | DIASTOLIC BLOOD PRESSURE: 70 MMHG | WEIGHT: 174.19 LBS | BODY MASS INDEX: 26.4 KG/M2 | OXYGEN SATURATION: 97 % | SYSTOLIC BLOOD PRESSURE: 136 MMHG | HEIGHT: 68 IN

## 2025-05-06 DIAGNOSIS — I10 PRIMARY HYPERTENSION: ICD-10-CM

## 2025-05-06 DIAGNOSIS — F32.A DEPRESSION, UNSPECIFIED DEPRESSION TYPE: Primary | ICD-10-CM

## 2025-05-06 PROCEDURE — 3078F DIAST BP <80 MM HG: CPT | Mod: CPTII,S$GLB,, | Performed by: FAMILY MEDICINE

## 2025-05-06 PROCEDURE — 1159F MED LIST DOCD IN RCRD: CPT | Mod: CPTII,S$GLB,, | Performed by: FAMILY MEDICINE

## 2025-05-06 PROCEDURE — 3075F SYST BP GE 130 - 139MM HG: CPT | Mod: CPTII,S$GLB,, | Performed by: FAMILY MEDICINE

## 2025-05-06 PROCEDURE — 3288F FALL RISK ASSESSMENT DOCD: CPT | Mod: CPTII,S$GLB,, | Performed by: FAMILY MEDICINE

## 2025-05-06 PROCEDURE — 1126F AMNT PAIN NOTED NONE PRSNT: CPT | Mod: CPTII,S$GLB,, | Performed by: FAMILY MEDICINE

## 2025-05-06 PROCEDURE — 99999 PR PBB SHADOW E&M-EST. PATIENT-LVL III: CPT | Mod: PBBFAC,,, | Performed by: FAMILY MEDICINE

## 2025-05-06 PROCEDURE — 99213 OFFICE O/P EST LOW 20 MIN: CPT | Mod: S$GLB,,, | Performed by: FAMILY MEDICINE

## 2025-05-06 PROCEDURE — 1160F RVW MEDS BY RX/DR IN RCRD: CPT | Mod: CPTII,S$GLB,, | Performed by: FAMILY MEDICINE

## 2025-05-06 PROCEDURE — 1101F PT FALLS ASSESS-DOCD LE1/YR: CPT | Mod: CPTII,S$GLB,, | Performed by: FAMILY MEDICINE

## 2025-05-06 RX ORDER — ESCITALOPRAM OXALATE 10 MG/1
10 TABLET ORAL
COMMUNITY
Start: 2025-03-30 | End: 2025-05-06

## 2025-05-06 NOTE — PROGRESS NOTES
Subjective:       Patient ID: José Miguel Austin is a 80 y.o. male.    Chief Complaint: Depression (2 month follow up)    Here for f/u on depression.    He reports he had a syncopal episode of 4/26.  This happened at home while standing.  He stopped all medications after this episode.    He is tolerating zoloft for the past month.   Lexapro did not give him any improvement.  Appetite normal.  Also taking Wellbutrin 300mg daily.  He has gone back to the gym.    He lives alone with not many friends.    Following with Dr. Barrientos for low testosterone    CAD s/p stents,SSS, HTN - following with Dr. Rothman, tolerating Pravachol 40mg daily, amlodipine 5mg, Micardis  Hypogonadism - following with urology, endocrinology  Chronic back pain - s/p lumbar surgery (discectomy) in 1998; he has no reflex in the left leg.  BPH - c/o incoplete bladder emptying; following with Dr. Tyler. Urolift discussed.           Past Medical History:   Diagnosis Date    Basal cell carcinoma     CAD (coronary artery disease)     Hypertension        Past Surgical History:   Procedure Laterality Date    APPENDECTOMY      BASAL CELL CARCINOMA EXCISION      left forehead    CARDIAC PACEMAKER PLACEMENT      CIRCUMCISION      CORONARY STENT PLACEMENT      4 stents in RCA    inguinal hernia      LUMBAR DISC SURGERY  1998       Review of patient's allergies indicates:   Allergen Reactions    Gabapentin Hallucinations    Hydrocodone Nausea Only    Codeine Nausea Only    Lyrica [pregabalin] Nausea Only     Nausea and dizziness       Social History     Socioeconomic History    Marital status:     Number of children: 3   Occupational History    Occupation: retired refinery     Occupation: 8,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     Comment: resting heart rate 50    Occupation: cycles in [employer] yearly    Occupation: 3 times a week gym   Tobacco Use    Smoking status: Never    Smokeless tobacco: Never   Substance and Sexual Activity     Alcohol use: No    Drug use: No   Social History Narrative    Exercise: gym 2-3 times per week        Cycles 5 days a week     Social Drivers of Health     Financial Resource Strain: Low Risk  (5/31/2019)    Overall Financial Resource Strain (CARDIA)     Difficulty of Paying Living Expenses: Not very hard   Food Insecurity: No Food Insecurity (5/31/2019)    Hunger Vital Sign     Worried About Running Out of Food in the Last Year: Never true     Ran Out of Food in the Last Year: Never true   Transportation Needs: No Transportation Needs (5/31/2019)    PRAPARE - Transportation     Lack of Transportation (Medical): No     Lack of Transportation (Non-Medical): No   Physical Activity: Sufficiently Active (5/31/2019)    Exercise Vital Sign     Days of Exercise per Week: 7 days     Minutes of Exercise per Session: 60 min   Stress: Stress Concern Present (5/31/2019)    [employer] - Occupational Stress Questionnaire     Feeling of Stress : Very much       Current Outpatient Medications on File Prior to Visit   Medication Sig Dispense Refill    amLODIPine (NORVASC) 5 MG tablet Take 1 tablet (5 mg total) by mouth once daily. 30 tablet 11    buPROPion (WELLBUTRIN XL) 300 MG 24 hr tablet TAKE 1 TABLET(300 MG) BY MOUTH EVERY DAY 90 tablet 3    papaverine 30 mg/mL injection Add phentolamine 1 mg/mL, add alprostadil 20 micrograms/mL 10 mL 11    pravastatin (PRAVACHOL) 40 MG tablet TAKE 1 TABLET BY MOUTH DAILY 90 tablet 2    tamsulosin (FLOMAX) 0.4 mg Cap Take 1 capsule (0.4 mg total) by mouth every evening. 30 capsule 11    telmisartan (MICARDIS) 20 MG Tab TAKE 1 TABLET(20 MG) BY MOUTH EVERY DAY 90 tablet 3    testosterone cypionate (DEPOTESTOTERONE CYPIONATE) 200 mg/mL injection INJECT 1.5 ML IN THE MUSCLE EVERY 2 WEEKS      sertraline (ZOLOFT) 50 MG tablet Take 1 tablet (50 mg total) by mouth once daily. 30 tablet 11     No current facility-administered medications on file prior to visit.  "      Family History   Problem Relation Name Age of Onset    Heart disease Mother      Heart disease Father      Heart disease Sister      Heart disease Brother         Review of Systems   Constitutional:  Negative for appetite change, chills, fever and unexpected weight change.   HENT:  Negative for sore throat and trouble swallowing.    Eyes:  Negative for pain and visual disturbance.   Respiratory:  Negative for cough, chest tightness, shortness of breath and wheezing.    Cardiovascular:  Negative for chest pain, palpitations and leg swelling.   Gastrointestinal:  Negative for abdominal pain, blood in stool, constipation, diarrhea and nausea.   Genitourinary:  Negative for difficulty urinating, dysuria and hematuria.   Musculoskeletal:  Negative for arthralgias, gait problem and neck pain.   Skin:  Negative for rash and wound.   Neurological:  Negative for dizziness, weakness, light-headedness and headaches.   Hematological:  Negative for adenopathy.   Psychiatric/Behavioral:  Positive for dysphoric mood.        Objective:      /70   Pulse 68   Ht 5' 8" (1.727 m)   Wt 79 kg (174 lb 2.6 oz)   SpO2 97%   BMI 26.48 kg/m²   Physical Exam  Constitutional:       General: He is not in acute distress.     Appearance: He is well-developed.   HENT:      Head: Normocephalic and atraumatic.      Right Ear: External ear normal.      Left Ear: External ear normal.      Mouth/Throat:      Pharynx: Uvula midline. No oropharyngeal exudate.   Eyes:      General: Lids are normal.      Conjunctiva/sclera: Conjunctivae normal.      Pupils: Pupils are equal, round, and reactive to light.   Neck:      Thyroid: No thyroid mass or thyromegaly.      Trachea: Phonation normal.   Cardiovascular:      Rate and Rhythm: Normal rate and regular rhythm.      Heart sounds: Normal heart sounds. No murmur heard.     No friction rub. No gallop.   Pulmonary:      Effort: Pulmonary effort is normal. No respiratory distress.      Breath " sounds: Normal breath sounds. No wheezing or rales.   Musculoskeletal:         General: Normal range of motion.      Cervical back: Full passive range of motion without pain, normal range of motion and neck supple.   Lymphadenopathy:      Cervical: No cervical adenopathy.   Skin:     General: Skin is warm and dry.   Neurological:      Mental Status: He is alert and oriented to person, place, and time.      Cranial Nerves: No cranial nerve deficit.      Coordination: Coordination normal.   Psychiatric:         Attention and Perception: Attention normal.         Mood and Affect: Affect is blunt.         Speech: Speech normal.         Behavior: Behavior normal.         Thought Content: Thought content normal.         Cognition and Memory: Cognition normal.         Judgment: Judgment normal.         Assessment:       1. Depression, unspecified depression type    2. Primary hypertension              Plan:       Depression, unspecified depression type    Primary hypertension              Remain off BP Medications; monitor home BP  Resume flomax QHS  Continue Wellbutrin XL 300mg; zoloft 50mg (consider increasing this at next visit)  Continue other present meds and cardiology, endocrine f/u  Counseled on regular exercise, maintenance of a healthy weight, balanced diet rich in fruits/vegetables and lean protein, and avoidance of unhealthy habits like smoking and excessive alcohol intake.  F/u 2 months    Visit today included increased complexity associated with the care of the episodic problems listed above addressed and managing the longitudinal care of the patient due to the serious and/or complex managed problem(s) listed above.

## 2025-05-12 ENCOUNTER — OFFICE VISIT (OUTPATIENT)
Dept: UROLOGY | Facility: CLINIC | Age: 80
End: 2025-05-12
Payer: MEDICARE

## 2025-05-12 VITALS — WEIGHT: 174.81 LBS | BODY MASS INDEX: 26.49 KG/M2 | HEIGHT: 68 IN

## 2025-05-12 DIAGNOSIS — N39.41 URGENCY INCONTINENCE: Primary | ICD-10-CM

## 2025-05-12 DIAGNOSIS — R35.0 BENIGN PROSTATIC HYPERPLASIA WITH URINARY FREQUENCY: ICD-10-CM

## 2025-05-12 DIAGNOSIS — N40.1 BENIGN PROSTATIC HYPERPLASIA WITH URINARY FREQUENCY: ICD-10-CM

## 2025-05-12 DIAGNOSIS — N32.81 OAB (OVERACTIVE BLADDER): ICD-10-CM

## 2025-05-12 PROCEDURE — 3288F FALL RISK ASSESSMENT DOCD: CPT | Mod: CPTII,S$GLB,, | Performed by: STUDENT IN AN ORGANIZED HEALTH CARE EDUCATION/TRAINING PROGRAM

## 2025-05-12 PROCEDURE — 1126F AMNT PAIN NOTED NONE PRSNT: CPT | Mod: CPTII,S$GLB,, | Performed by: STUDENT IN AN ORGANIZED HEALTH CARE EDUCATION/TRAINING PROGRAM

## 2025-05-12 PROCEDURE — G2211 COMPLEX E/M VISIT ADD ON: HCPCS | Mod: S$GLB,,, | Performed by: STUDENT IN AN ORGANIZED HEALTH CARE EDUCATION/TRAINING PROGRAM

## 2025-05-12 PROCEDURE — 1159F MED LIST DOCD IN RCRD: CPT | Mod: CPTII,S$GLB,, | Performed by: STUDENT IN AN ORGANIZED HEALTH CARE EDUCATION/TRAINING PROGRAM

## 2025-05-12 PROCEDURE — 99214 OFFICE O/P EST MOD 30 MIN: CPT | Mod: S$GLB,,, | Performed by: STUDENT IN AN ORGANIZED HEALTH CARE EDUCATION/TRAINING PROGRAM

## 2025-05-12 PROCEDURE — 1160F RVW MEDS BY RX/DR IN RCRD: CPT | Mod: CPTII,S$GLB,, | Performed by: STUDENT IN AN ORGANIZED HEALTH CARE EDUCATION/TRAINING PROGRAM

## 2025-05-12 PROCEDURE — 1101F PT FALLS ASSESS-DOCD LE1/YR: CPT | Mod: CPTII,S$GLB,, | Performed by: STUDENT IN AN ORGANIZED HEALTH CARE EDUCATION/TRAINING PROGRAM

## 2025-05-12 PROCEDURE — 99999 PR PBB SHADOW E&M-EST. PATIENT-LVL III: CPT | Mod: PBBFAC,,, | Performed by: STUDENT IN AN ORGANIZED HEALTH CARE EDUCATION/TRAINING PROGRAM

## 2025-05-12 RX ORDER — VIBEGRON 75 MG/1
75 TABLET, FILM COATED ORAL DAILY
Qty: 30 TABLET | Refills: 11 | Status: SHIPPED | OUTPATIENT
Start: 2025-05-12 | End: 2026-05-12

## 2025-05-12 NOTE — PROGRESS NOTES
"[place] [facility]   Clinic Note    Subjective:     Chief Complaint: Benign Prostatic Hypertrophy      History of Present Illness    CHIEF COMPLAINT:  José Miguel presents today for follow up of urinary symptoms.    URINARY SYMPTOMS:  He reports urinary leakage as his primary concern with multiple episodes occurring daily. He experiences sudden urges to urinate. He has a strong urinary stream when compliant with Flomax and reports good response with no urinary problems while on the medication. He is not currently taking Myrbetriq.  IPSS 9/4    SEXUAL HEALTH:  He is not currently using Trimix injections for erectile dysfunction and reports no concerns about this issue at present.        Prostate US 3/2023 - 37 cc. Cystoscopy 3/2023 showed mild intravesical prostatic protrusion and bilobar enlargement     He is on T therapy and feels better on therapy. Managed by Dr. Dickey      IPSS Questionnaire (AUA-SS):  1) Incomplete Emptying 0 - Not at all   2) Frequency 2 - Less than half the time   3) Intermittency 0 - Not at all   4) Urgency 3 - About half the time   5) Weak Stream  0 - Not at all   6) Straining 0 - Not at all   7) Nocturia 4 - 4 times   Total score:   9   Quality of Life:  4 - Mostly Dissatisfied        Past medical, family, surgical and social history reviewed as documented in chart with pertinent positive medical, family, surgical and social history detailed in HPI.    A review systems was conducted with pertinent positive and negative findings documented in HPI.    Objective:     Estimated body mass index is 26.58 kg/m² as calculated from the following:    Height as of this encounter: 5' 8" (1.727 m).    Weight as of this encounter: 79.3 kg (174 lb 13.2 oz).    Vital Signs (Most Recent)       General: No acute distress, well developed.   Head: Normocephalic, atraumatic  CV: no cyanosis  Lungs: normal respiratory effort, no respiratory distress    Labs reviewed below:  Lab Results   Component Value Date    " BUN 20 02/05/2025    CREATININE 1.2 02/05/2025    WBC 8.53 02/05/2025    HGB 14.1 02/05/2025    HCT 43.3 02/05/2025     02/05/2025    AST 27 02/05/2025    ALT 25 02/05/2025    ALKPHOS 81 02/05/2025    ALBUMIN 3.4 (L) 02/05/2025    HGBA1C 5.4 03/30/2021        PSA trend reviewed below:  Lab Results   Component Value Date    PSA 0.64 02/05/2025    PSA 1.4 11/27/2023    PSA 0.87 11/01/2022    PSA 0.93 09/30/2021    PSA 0.71 02/07/2020    PSA 0.62 12/20/2018    PSA 0.52 12/03/2018    PSA 0.94 11/21/2017    PSA 0.83 10/04/2017    PSA 0.66 03/09/2016    PSADIAG 1.2 07/23/2024    PSADIAG 0.95 04/26/2022    PSAFREE 0.13 10/04/2017    PSAFREEPCT 28.26 10/04/2017      Assessment:     1. Urgency incontinence    2. Benign prostatic hyperplasia with urinary frequency    3. OAB (overactive bladder)      Plan:     Assessment & Plan    - Assessed urinary symptoms, noting persistent leakage as primary concern despite Flomax use.  - Considered outlet procedures or medication management for urinary incontinence.  - Initiated trial of Vibegron/Gemtesa for bladder relaxation before considering procedural interventions.  - Clarified that potential surgical intervention would primarily improve urinary stream, and may improve storage/irritative symptoms    - Explaining that it works by relaxing the bladder to reduce urgency and leakage.    - Started Vibegron (Gemtesa) for urinary leakage and continue Flomax for urinary symptoms.    - Follow up in 3 months       The visit today included increased complexity associated with the care of the episodic problem addressed above as well as managing the longitudinal care of the patient due to the serious and/or complex managed problem(s).      Portions of this note were generated by [employer] and [employer] dictation services    Nathan Tyler MD

## 2025-06-06 ENCOUNTER — TELEPHONE (OUTPATIENT)
Dept: FAMILY MEDICINE | Facility: CLINIC | Age: 80
End: 2025-06-06
Payer: MEDICARE

## 2025-07-25 ENCOUNTER — CLINICAL SUPPORT (OUTPATIENT)
Dept: CARDIOLOGY | Facility: HOSPITAL | Age: 80
End: 2025-07-25
Payer: MEDICARE

## 2025-07-25 ENCOUNTER — HOSPITAL ENCOUNTER (OUTPATIENT)
Dept: CARDIOLOGY | Facility: HOSPITAL | Age: 80
Discharge: HOME | End: 2025-07-25
Attending: INTERNAL MEDICINE
Payer: MEDICARE

## 2025-07-25 DIAGNOSIS — Z95.0 PRESENCE OF CARDIAC PACEMAKER: ICD-10-CM

## 2025-07-25 LAB
OHS CV AF BURDEN PERCENT: < 1
OHS CV DC REMOTE DEVICE TYPE: NORMAL
OHS CV RV PACING PERCENT: 1 %

## 2025-07-25 PROCEDURE — 93294 REM INTERROG EVL PM/LDLS PM: CPT | Mod: ,,, | Performed by: INTERNAL MEDICINE

## 2025-07-25 PROCEDURE — 93296 REM INTERROG EVL PM/IDS: CPT | Mod: PO | Performed by: INTERNAL MEDICINE

## 2025-08-12 ENCOUNTER — OFFICE VISIT (OUTPATIENT)
Dept: UROLOGY | Facility: CLINIC | Age: 80
End: 2025-08-12
Payer: MEDICARE

## 2025-08-12 VITALS — HEIGHT: 68 IN | BODY MASS INDEX: 26.73 KG/M2 | WEIGHT: 176.38 LBS

## 2025-08-12 DIAGNOSIS — R35.0 BENIGN PROSTATIC HYPERPLASIA WITH URINARY FREQUENCY: Primary | ICD-10-CM

## 2025-08-12 DIAGNOSIS — N39.41 URGENCY INCONTINENCE: ICD-10-CM

## 2025-08-12 DIAGNOSIS — N32.81 OAB (OVERACTIVE BLADDER): ICD-10-CM

## 2025-08-12 DIAGNOSIS — N40.1 BENIGN PROSTATIC HYPERPLASIA WITH URINARY FREQUENCY: Primary | ICD-10-CM

## 2025-08-12 PROCEDURE — 99999 PR PBB SHADOW E&M-EST. PATIENT-LVL II: CPT | Mod: PBBFAC,,, | Performed by: STUDENT IN AN ORGANIZED HEALTH CARE EDUCATION/TRAINING PROGRAM

## 2025-08-12 PROCEDURE — 1101F PT FALLS ASSESS-DOCD LE1/YR: CPT | Mod: CPTII,S$GLB,, | Performed by: STUDENT IN AN ORGANIZED HEALTH CARE EDUCATION/TRAINING PROGRAM

## 2025-08-12 PROCEDURE — 1126F AMNT PAIN NOTED NONE PRSNT: CPT | Mod: CPTII,S$GLB,, | Performed by: STUDENT IN AN ORGANIZED HEALTH CARE EDUCATION/TRAINING PROGRAM

## 2025-08-12 PROCEDURE — 1159F MED LIST DOCD IN RCRD: CPT | Mod: CPTII,S$GLB,, | Performed by: STUDENT IN AN ORGANIZED HEALTH CARE EDUCATION/TRAINING PROGRAM

## 2025-08-12 PROCEDURE — 3288F FALL RISK ASSESSMENT DOCD: CPT | Mod: CPTII,S$GLB,, | Performed by: STUDENT IN AN ORGANIZED HEALTH CARE EDUCATION/TRAINING PROGRAM

## 2025-08-12 PROCEDURE — 99214 OFFICE O/P EST MOD 30 MIN: CPT | Mod: S$GLB,,, | Performed by: STUDENT IN AN ORGANIZED HEALTH CARE EDUCATION/TRAINING PROGRAM
